# Patient Record
Sex: FEMALE | Race: WHITE | Employment: OTHER | URBAN - NONMETROPOLITAN AREA
[De-identification: names, ages, dates, MRNs, and addresses within clinical notes are randomized per-mention and may not be internally consistent; named-entity substitution may affect disease eponyms.]

---

## 2017-01-09 ENCOUNTER — TELEPHONE (OUTPATIENT)
Dept: FAMILY MEDICINE CLINIC | Age: 67
End: 2017-01-09

## 2017-01-09 DIAGNOSIS — M51.36 LUMBAR DISC NARROWING: Primary | ICD-10-CM

## 2017-05-25 ENCOUNTER — OFFICE VISIT (OUTPATIENT)
Dept: FAMILY MEDICINE CLINIC | Age: 67
End: 2017-05-25

## 2017-05-25 VITALS
BODY MASS INDEX: 26.09 KG/M2 | DIASTOLIC BLOOD PRESSURE: 70 MMHG | SYSTOLIC BLOOD PRESSURE: 122 MMHG | HEART RATE: 66 BPM | HEIGHT: 65 IN | WEIGHT: 156.6 LBS | RESPIRATION RATE: 18 BRPM

## 2017-05-25 DIAGNOSIS — E03.9 ACQUIRED HYPOTHYROIDISM: ICD-10-CM

## 2017-05-25 DIAGNOSIS — E78.00 PURE HYPERCHOLESTEROLEMIA: Primary | ICD-10-CM

## 2017-05-25 DIAGNOSIS — M81.0 OSTEOPOROSIS: ICD-10-CM

## 2017-05-25 PROBLEM — M85.80 OSTEOPENIA: Status: ACTIVE | Noted: 2017-05-25

## 2017-05-25 PROCEDURE — G8420 CALC BMI NORM PARAMETERS: HCPCS | Performed by: FAMILY MEDICINE

## 2017-05-25 PROCEDURE — G8427 DOCREV CUR MEDS BY ELIG CLIN: HCPCS | Performed by: FAMILY MEDICINE

## 2017-05-25 PROCEDURE — G8400 PT W/DXA NO RESULTS DOC: HCPCS | Performed by: FAMILY MEDICINE

## 2017-05-25 PROCEDURE — 4005F PHARM THX FOR OP RXD: CPT | Performed by: FAMILY MEDICINE

## 2017-05-25 PROCEDURE — 1123F ACP DISCUSS/DSCN MKR DOCD: CPT | Performed by: FAMILY MEDICINE

## 2017-05-25 PROCEDURE — 4040F PNEUMOC VAC/ADMIN/RCVD: CPT | Performed by: FAMILY MEDICINE

## 2017-05-25 PROCEDURE — 99214 OFFICE O/P EST MOD 30 MIN: CPT | Performed by: FAMILY MEDICINE

## 2017-05-25 PROCEDURE — 3014F SCREEN MAMMO DOC REV: CPT | Performed by: FAMILY MEDICINE

## 2017-05-25 PROCEDURE — 1036F TOBACCO NON-USER: CPT | Performed by: FAMILY MEDICINE

## 2017-05-25 PROCEDURE — 96372 THER/PROPH/DIAG INJ SC/IM: CPT | Performed by: FAMILY MEDICINE

## 2017-05-25 PROCEDURE — 1090F PRES/ABSN URINE INCON ASSESS: CPT | Performed by: FAMILY MEDICINE

## 2017-05-25 PROCEDURE — 3017F COLORECTAL CA SCREEN DOC REV: CPT | Performed by: FAMILY MEDICINE

## 2017-09-05 DIAGNOSIS — E78.5 HYPERLIPIDEMIA: ICD-10-CM

## 2017-09-05 RX ORDER — ATORVASTATIN CALCIUM 20 MG/1
TABLET, FILM COATED ORAL
Qty: 90 TABLET | Refills: 3 | Status: SHIPPED | OUTPATIENT
Start: 2017-09-05 | End: 2018-07-24 | Stop reason: SDUPTHER

## 2017-10-23 ENCOUNTER — HOSPITAL ENCOUNTER (OUTPATIENT)
Dept: WOMENS IMAGING | Age: 67
Discharge: HOME | End: 2017-10-23
Payer: MEDICARE

## 2017-10-23 DIAGNOSIS — Z12.31 VISIT FOR SCREENING MAMMOGRAM: ICD-10-CM

## 2017-10-23 PROCEDURE — 77063 BREAST TOMOSYNTHESIS BI: CPT

## 2017-10-25 ENCOUNTER — HOSPITAL ENCOUNTER (OUTPATIENT)
Dept: WOMENS IMAGING | Age: 67
Discharge: HOME | End: 2017-10-25
Payer: MEDICARE

## 2017-10-25 ENCOUNTER — APPOINTMENT (OUTPATIENT)
Dept: WOMENS IMAGING | Age: 67
End: 2017-10-25
Payer: MEDICARE

## 2017-10-25 DIAGNOSIS — R92.2 BREAST DENSITY: ICD-10-CM

## 2017-10-25 PROCEDURE — G0279 TOMOSYNTHESIS, MAMMO: HCPCS

## 2017-11-21 ENCOUNTER — TELEPHONE (OUTPATIENT)
Dept: FAMILY MEDICINE CLINIC | Age: 67
End: 2017-11-21

## 2017-12-11 ENCOUNTER — NURSE ONLY (OUTPATIENT)
Dept: FAMILY MEDICINE CLINIC | Age: 67
End: 2017-12-11
Payer: MEDICARE

## 2017-12-11 DIAGNOSIS — M81.0 SENILE OSTEOPOROSIS: Primary | ICD-10-CM

## 2017-12-11 DIAGNOSIS — Z78.0 POSTMENOPAUSAL STATUS (AGE-RELATED) (NATURAL): ICD-10-CM

## 2017-12-11 PROCEDURE — 96372 THER/PROPH/DIAG INJ SC/IM: CPT | Performed by: FAMILY MEDICINE

## 2017-12-11 NOTE — PROGRESS NOTES
After obtaining consent, and per orders of Dr. Sari Szymanski, injection of Prolia 60mg/ml subcutaneous given in Right arm by Ciera Osborn. Patient instructed to report any adverse reaction to me immediately.     ABN Signed  Pt tolerated well

## 2017-12-27 ENCOUNTER — OFFICE VISIT (OUTPATIENT)
Dept: FAMILY MEDICINE CLINIC | Age: 67
End: 2017-12-27
Payer: MEDICARE

## 2017-12-27 VITALS
WEIGHT: 156.6 LBS | HEART RATE: 69 BPM | DIASTOLIC BLOOD PRESSURE: 60 MMHG | BODY MASS INDEX: 26.09 KG/M2 | SYSTOLIC BLOOD PRESSURE: 112 MMHG | RESPIRATION RATE: 16 BRPM | OXYGEN SATURATION: 96 %

## 2017-12-27 DIAGNOSIS — M81.0 AGE-RELATED OSTEOPOROSIS WITHOUT CURRENT PATHOLOGICAL FRACTURE: ICD-10-CM

## 2017-12-27 DIAGNOSIS — Z00.00 ROUTINE GENERAL MEDICAL EXAMINATION AT A HEALTH CARE FACILITY: ICD-10-CM

## 2017-12-27 DIAGNOSIS — E78.00 PURE HYPERCHOLESTEROLEMIA: Primary | ICD-10-CM

## 2017-12-27 DIAGNOSIS — E03.9 ACQUIRED HYPOTHYROIDISM: ICD-10-CM

## 2017-12-27 PROCEDURE — 99214 OFFICE O/P EST MOD 30 MIN: CPT | Performed by: FAMILY MEDICINE

## 2017-12-27 PROCEDURE — 4040F PNEUMOC VAC/ADMIN/RCVD: CPT | Performed by: FAMILY MEDICINE

## 2017-12-27 PROCEDURE — 1123F ACP DISCUSS/DSCN MKR DOCD: CPT | Performed by: FAMILY MEDICINE

## 2017-12-27 PROCEDURE — 3014F SCREEN MAMMO DOC REV: CPT | Performed by: FAMILY MEDICINE

## 2017-12-27 PROCEDURE — 3017F COLORECTAL CA SCREEN DOC REV: CPT | Performed by: FAMILY MEDICINE

## 2017-12-27 PROCEDURE — 1036F TOBACCO NON-USER: CPT | Performed by: FAMILY MEDICINE

## 2017-12-27 PROCEDURE — G8419 CALC BMI OUT NRM PARAM NOF/U: HCPCS | Performed by: FAMILY MEDICINE

## 2017-12-27 PROCEDURE — G8427 DOCREV CUR MEDS BY ELIG CLIN: HCPCS | Performed by: FAMILY MEDICINE

## 2017-12-27 PROCEDURE — 1090F PRES/ABSN URINE INCON ASSESS: CPT | Performed by: FAMILY MEDICINE

## 2017-12-27 PROCEDURE — G8400 PT W/DXA NO RESULTS DOC: HCPCS | Performed by: FAMILY MEDICINE

## 2017-12-27 PROCEDURE — 4005F PHARM THX FOR OP RXD: CPT | Performed by: FAMILY MEDICINE

## 2017-12-27 PROCEDURE — G8484 FLU IMMUNIZE NO ADMIN: HCPCS | Performed by: FAMILY MEDICINE

## 2017-12-29 NOTE — PROGRESS NOTES
[facility] PROFESSIONAL SERVS  [facility] FAMILY MEDICINE  [address] [facility]  Dept: [phone]  Dept Fax: [phone]  Loc: [phone]    Venita Adams is a 75 y.o. female who presents today for:  Chief Complaint   Patient presents with    6 Month Follow-Up     hypercholesterolemia           HPI:     HPI    Well Adult Physical: Patient here for a comprehensive physical exam.The patient reports problems - cholesterol and thyroid issues are controlled on current medications. Recent dexa scan in 2015 shows osteoporosis. Do you take any herbs or supplements that were not prescribed by a doctor? no Are you taking calcium supplements? yes Are you taking aspirin daily? yes   History:  Any STD's in the past? none        Reviewed chart for past medical history , surgical history , allergies, social history , family history and medications. Health Maintenance   Topic Date Due    Flu vaccine (1) 12/29/2017 (Originally 9/1/2017)    Breast cancer screen  10/25/2019    Diabetes screen  01/07/2020    Lipid screen  01/07/2022    DTaP/Tdap/Td vaccine (2 - Td) 08/14/2023    Colon cancer screen colonoscopy  01/29/2026    Zostavax vaccine  Completed    DEXA (modify frequency per FRAX score)  Completed    Pneumococcal low/med risk  Completed    Hepatitis C screen  Completed       Subjective:      Constitutional: Negative for fever, chills, diaphoresis, activity change, appetite change and fatigue. HENT: Negative for hearing loss, ear pain, congestion, sore throat, rhinorrhea, postnasal drip and ear discharge. Eyes: Negative for photophobia and visual disturbance. Respiratory: Negative for cough, chest tightness, shortness of breath and wheezing. Cardiovascular: Negative for chest pain and leg swelling. Gastrointestinal: Negative for nausea, vomiting, abdominal pain, diarrhea and constipation. Genitourinary: Negative for dysuria, urgency and frequency.    Neurological: Negative for weakness, light-headedness and headaches. Psychiatric/Behavioral: Negative for sleep disturbance. Objective:     Vitals:    12/27/17 1156 12/27/17 1200   BP: 100/60 112/60   Site: Left Arm Right Arm   Position: Sitting    Cuff Size: Small Adult    Pulse: 69    Resp: 16    SpO2: 96%    Weight: 156 lb 9.6 oz (71 kg)      Wt Readings from Last 3 Encounters:   12/27/17 156 lb 9.6 oz (71 kg)   05/25/17 156 lb 9.6 oz (71 kg)   12/29/16 154 lb 6.4 oz (70 kg)       Physical Exam  Physical Exam   Constitutional: Vital signs are normal. She appears well-developed and well-nourished. She is active. HENT:   Head: Normocephalic and atraumatic. Right Ear: Tympanic membrane, external ear and ear canal normal. No drainage or tenderness. Left Ear: Tympanic membrane, external ear and ear canal normal. No drainage or tenderness. Nose: Nose normal. No mucosal edema or rhinorrhea. Mouth/Throat: Uvula is midline, oropharynx is clear and moist and mucous membranes are normal. Mucous membranes are not pale. Normal dentition. No posterior oropharyngeal edema or posterior oropharyngeal erythema. Eyes: Lids are normal. Right eye exhibits no chemosis and no discharge. Left eye exhibits no chemosis and no drainage. Right conjunctiva has no hemorrhage. Left conjunctiva has no hemorrhage. Right eye exhibits normal extraocular motion. Left eye exhibits normal extraocular motion. Right pupil is round and reactive. Left pupil is round and reactive. Pupils are equal.   Cardiovascular: Normal rate, regular rhythm, S1 normal, S2 normal and normal heart sounds. Exam reveals no gallop. No murmur heard. Pulmonary/Chest: Effort normal and breath sounds normal. No respiratory distress. She has no wheezes. She has no rhonchi. She has no rales. Abdominal: Soft. Normal appearance and bowel sounds are normal. She exhibits no distension and no mass. There is no hepatosplenomegaly. No tenderness.  She has no rigidity, no rebound and no guarding. No hernia. Musculoskeletal:        Right lower leg: She exhibits no edema. Left lower leg: She exhibits no edema. Neurological: She is alert. Assessment/Plan   Mac Stage was seen today for 6 month follow-up. Diagnoses and all orders for this visit:    Pure hypercholesterolemia  -     CBC; Future  -     Comprehensive Metabolic Panel; Future  -     Lipid Panel; Future    Acquired hypothyroidism  -     TSH With Reflex Ft4; Future    Age-related osteoporosis without current pathological fracture    Routine general medical examination at a health care facility  -     CBC; Future  -     Comprehensive Metabolic Panel; Future  -     Lipid Panel; Future  -     TSH With Reflex Ft4; Future    No change to medications   Continue healthy diet and exercise  Aspirin daily     Discussed use, benefit, and side effects of prescribed medications. All patient questions answered. Pt voiced understanding. Reviewed health maintenance. Instructed to continue current medications, diet and exercise. Patient agreed with treatment plan. Follow up as directed.      Electronically signed by Cody Delarosa MD

## 2018-01-18 ENCOUNTER — HOSPITAL ENCOUNTER (OUTPATIENT)
Age: 68
Discharge: HOME | End: 2018-01-18
Payer: MEDICARE

## 2018-01-18 DIAGNOSIS — E78.00 PURE HYPERCHOLESTEROLEMIA: ICD-10-CM

## 2018-01-18 DIAGNOSIS — Z00.00 ROUTINE GENERAL MEDICAL EXAMINATION AT A HEALTH CARE FACILITY: ICD-10-CM

## 2018-01-18 DIAGNOSIS — E03.9 ACQUIRED HYPOTHYROIDISM: ICD-10-CM

## 2018-01-18 LAB
ALBUMIN SERPL-MCNC: 4.3 G/DL (ref 3.5–5.1)
ALP BLD-CCNC: 66 U/L (ref 38–126)
ALT SERPL-CCNC: 13 U/L (ref 11–66)
ANION GAP SERPL CALCULATED.3IONS-SCNC: 16 MEQ/L (ref 8–16)
AST SERPL-CCNC: 17 U/L (ref 5–40)
BILIRUB SERPL-MCNC: 0.6 MG/DL (ref 0.3–1.2)
BUN BLDV-MCNC: 13 MG/DL (ref 7–22)
CALCIUM SERPL-MCNC: 9 MG/DL (ref 8.5–10.5)
CHLORIDE BLD-SCNC: 105 MEQ/L (ref 98–111)
CHOLESTEROL, TOTAL: 133 MG/DL (ref 100–199)
CO2: 24 MEQ/L (ref 23–33)
CREAT SERPL-MCNC: 0.6 MG/DL (ref 0.4–1.2)
GFR SERPL CREATININE-BSD FRML MDRD: > 90 ML/MIN/1.73M2
GLUCOSE BLD-MCNC: 102 MG/DL (ref 70–108)
HCT VFR BLD CALC: 44.2 % (ref 37–47)
HDLC SERPL-MCNC: 68 MG/DL
HEMOGLOBIN: 14.6 GM/DL (ref 12–16)
LDL CHOLESTEROL CALCULATED: 50 MG/DL
MCH RBC QN AUTO: 30.1 PG (ref 27–31)
MCHC RBC AUTO-ENTMCNC: 33 GM/DL (ref 33–37)
MCV RBC AUTO: 91.3 FL (ref 81–99)
PDW BLD-RTO: 13.3 % (ref 11.5–14.5)
PLATELET # BLD: 272 THOU/MM3 (ref 130–400)
PMV BLD AUTO: 9.1 MCM (ref 7.4–10.4)
POTASSIUM SERPL-SCNC: 4.4 MEQ/L (ref 3.5–5.2)
RBC # BLD: 4.84 MILL/MM3 (ref 4.2–5.4)
SODIUM BLD-SCNC: 145 MEQ/L (ref 135–145)
TOTAL PROTEIN: 7.2 G/DL (ref 6.1–8)
TRIGL SERPL-MCNC: 76 MG/DL (ref 0–199)
TSH SERPL DL<=0.05 MIU/L-ACNC: 0.42 UIU/ML (ref 0.4–4.2)
WBC # BLD: 6.4 THOU/MM3 (ref 4.8–10.8)

## 2018-01-18 PROCEDURE — 80053 COMPREHEN METABOLIC PANEL: CPT

## 2018-01-18 PROCEDURE — 80061 LIPID PANEL: CPT

## 2018-01-18 PROCEDURE — 85027 COMPLETE CBC AUTOMATED: CPT

## 2018-01-18 PROCEDURE — 36415 COLL VENOUS BLD VENIPUNCTURE: CPT

## 2018-01-18 PROCEDURE — 84443 ASSAY THYROID STIM HORMONE: CPT

## 2018-04-05 ENCOUNTER — OFFICE VISIT (OUTPATIENT)
Dept: FAMILY MEDICINE CLINIC | Age: 68
End: 2018-04-05
Payer: MEDICARE

## 2018-04-05 VITALS
WEIGHT: 150 LBS | BODY MASS INDEX: 25.61 KG/M2 | DIASTOLIC BLOOD PRESSURE: 70 MMHG | HEART RATE: 80 BPM | SYSTOLIC BLOOD PRESSURE: 130 MMHG | RESPIRATION RATE: 16 BRPM | HEIGHT: 64 IN | TEMPERATURE: 99.6 F

## 2018-04-05 DIAGNOSIS — J01.90 ACUTE BACTERIAL SINUSITIS: Primary | ICD-10-CM

## 2018-04-05 DIAGNOSIS — B96.89 ACUTE BACTERIAL SINUSITIS: Primary | ICD-10-CM

## 2018-04-05 PROCEDURE — G8419 CALC BMI OUT NRM PARAM NOF/U: HCPCS | Performed by: NURSE PRACTITIONER

## 2018-04-05 PROCEDURE — 3017F COLORECTAL CA SCREEN DOC REV: CPT | Performed by: NURSE PRACTITIONER

## 2018-04-05 PROCEDURE — 99213 OFFICE O/P EST LOW 20 MIN: CPT | Performed by: NURSE PRACTITIONER

## 2018-04-05 PROCEDURE — 4040F PNEUMOC VAC/ADMIN/RCVD: CPT | Performed by: NURSE PRACTITIONER

## 2018-04-05 PROCEDURE — 1123F ACP DISCUSS/DSCN MKR DOCD: CPT | Performed by: NURSE PRACTITIONER

## 2018-04-05 PROCEDURE — G8400 PT W/DXA NO RESULTS DOC: HCPCS | Performed by: NURSE PRACTITIONER

## 2018-04-05 PROCEDURE — G8427 DOCREV CUR MEDS BY ELIG CLIN: HCPCS | Performed by: NURSE PRACTITIONER

## 2018-04-05 PROCEDURE — 1036F TOBACCO NON-USER: CPT | Performed by: NURSE PRACTITIONER

## 2018-04-05 PROCEDURE — 1090F PRES/ABSN URINE INCON ASSESS: CPT | Performed by: NURSE PRACTITIONER

## 2018-04-05 PROCEDURE — 3014F SCREEN MAMMO DOC REV: CPT | Performed by: NURSE PRACTITIONER

## 2018-04-05 RX ORDER — AZITHROMYCIN 250 MG/1
TABLET, FILM COATED ORAL
Qty: 1 PACKET | Refills: 0 | Status: SHIPPED | OUTPATIENT
Start: 2018-04-05 | End: 2018-04-15

## 2018-06-11 ENCOUNTER — TELEPHONE (OUTPATIENT)
Dept: FAMILY MEDICINE CLINIC | Age: 68
End: 2018-06-11

## 2018-06-15 ENCOUNTER — OFFICE VISIT (OUTPATIENT)
Dept: FAMILY MEDICINE CLINIC | Age: 68
End: 2018-06-15
Payer: MEDICARE

## 2018-06-15 DIAGNOSIS — M81.0 AGE-RELATED OSTEOPOROSIS WITHOUT CURRENT PATHOLOGICAL FRACTURE: Primary | ICD-10-CM

## 2018-06-15 PROCEDURE — 96372 THER/PROPH/DIAG INJ SC/IM: CPT | Performed by: FAMILY MEDICINE

## 2018-07-24 DIAGNOSIS — E78.5 HYPERLIPIDEMIA: ICD-10-CM

## 2018-07-24 RX ORDER — ATORVASTATIN CALCIUM 20 MG/1
TABLET, FILM COATED ORAL
Qty: 90 TABLET | Refills: 3 | Status: SHIPPED | OUTPATIENT
Start: 2018-07-24 | End: 2019-08-12 | Stop reason: SDUPTHER

## 2018-10-04 ENCOUNTER — HOSPITAL ENCOUNTER (OUTPATIENT)
Dept: WOMENS IMAGING | Age: 68
Discharge: HOME | End: 2018-10-04
Payer: MEDICARE

## 2018-10-04 DIAGNOSIS — Z12.31 VISIT FOR SCREENING MAMMOGRAM: ICD-10-CM

## 2018-10-04 PROCEDURE — 77063 BREAST TOMOSYNTHESIS BI: CPT

## 2018-10-30 ENCOUNTER — TELEPHONE (OUTPATIENT)
Dept: FAMILY MEDICINE CLINIC | Age: 68
End: 2018-10-30

## 2018-12-17 ENCOUNTER — NURSE ONLY (OUTPATIENT)
Dept: FAMILY MEDICINE CLINIC | Age: 68
End: 2018-12-17
Payer: MEDICARE

## 2018-12-17 DIAGNOSIS — M81.0 AGE-RELATED OSTEOPOROSIS WITHOUT CURRENT PATHOLOGICAL FRACTURE: Primary | ICD-10-CM

## 2018-12-17 PROCEDURE — 96372 THER/PROPH/DIAG INJ SC/IM: CPT | Performed by: FAMILY MEDICINE

## 2019-05-08 ENCOUNTER — OFFICE VISIT (OUTPATIENT)
Dept: FAMILY MEDICINE CLINIC | Age: 69
End: 2019-05-08
Payer: MEDICARE

## 2019-05-08 VITALS
TEMPERATURE: 98.1 F | RESPIRATION RATE: 20 BRPM | WEIGHT: 163 LBS | SYSTOLIC BLOOD PRESSURE: 132 MMHG | BODY MASS INDEX: 27.83 KG/M2 | HEART RATE: 80 BPM | DIASTOLIC BLOOD PRESSURE: 80 MMHG | HEIGHT: 64 IN

## 2019-05-08 DIAGNOSIS — N89.8 VAGINAL DISCHARGE: ICD-10-CM

## 2019-05-08 DIAGNOSIS — E03.9 ACQUIRED HYPOTHYROIDISM: ICD-10-CM

## 2019-05-08 DIAGNOSIS — E78.00 PURE HYPERCHOLESTEROLEMIA: Primary | ICD-10-CM

## 2019-05-08 DIAGNOSIS — M81.0 SENILE OSTEOPOROSIS: ICD-10-CM

## 2019-05-08 DIAGNOSIS — Z00.00 ROUTINE GENERAL MEDICAL EXAMINATION AT A HEALTH CARE FACILITY: ICD-10-CM

## 2019-05-08 PROCEDURE — 1036F TOBACCO NON-USER: CPT | Performed by: FAMILY MEDICINE

## 2019-05-08 PROCEDURE — 3017F COLORECTAL CA SCREEN DOC REV: CPT | Performed by: FAMILY MEDICINE

## 2019-05-08 PROCEDURE — G8419 CALC BMI OUT NRM PARAM NOF/U: HCPCS | Performed by: FAMILY MEDICINE

## 2019-05-08 PROCEDURE — G8427 DOCREV CUR MEDS BY ELIG CLIN: HCPCS | Performed by: FAMILY MEDICINE

## 2019-05-08 PROCEDURE — 93000 ELECTROCARDIOGRAM COMPLETE: CPT | Performed by: FAMILY MEDICINE

## 2019-05-08 PROCEDURE — 1090F PRES/ABSN URINE INCON ASSESS: CPT | Performed by: FAMILY MEDICINE

## 2019-05-08 PROCEDURE — 4040F PNEUMOC VAC/ADMIN/RCVD: CPT | Performed by: FAMILY MEDICINE

## 2019-05-08 PROCEDURE — 1123F ACP DISCUSS/DSCN MKR DOCD: CPT | Performed by: FAMILY MEDICINE

## 2019-05-08 PROCEDURE — 99214 OFFICE O/P EST MOD 30 MIN: CPT | Performed by: FAMILY MEDICINE

## 2019-05-08 PROCEDURE — G0438 PPPS, INITIAL VISIT: HCPCS | Performed by: FAMILY MEDICINE

## 2019-05-08 PROCEDURE — G8400 PT W/DXA NO RESULTS DOC: HCPCS | Performed by: FAMILY MEDICINE

## 2019-05-08 ASSESSMENT — PATIENT HEALTH QUESTIONNAIRE - PHQ9
SUM OF ALL RESPONSES TO PHQ QUESTIONS 1-9: 0
SUM OF ALL RESPONSES TO PHQ QUESTIONS 1-9: 0

## 2019-05-08 ASSESSMENT — LIFESTYLE VARIABLES: HOW OFTEN DO YOU HAVE A DRINK CONTAINING ALCOHOL: 0

## 2019-05-08 ASSESSMENT — ANXIETY QUESTIONNAIRES: GAD7 TOTAL SCORE: 0

## 2019-05-08 NOTE — PROGRESS NOTES
Eyes: Negative for photophobia and visual disturbance. Respiratory: Negative for cough, chest tightness, shortness of breath and wheezing. Cardiovascular: Negative for chest pain and leg swelling. Gastrointestinal: Negative for nausea, vomiting, abdominal pain, diarrhea and constipation. Genitourinary: Negative for dysuria, urgency and frequency. Neurological: Negative for weakness, light-headedness and headaches. Psychiatric/Behavioral: Negative for sleep disturbance. Objective:     Vitals:    05/08/19 1109 05/08/19 1122   BP: (!) 150/82 138/78   Site: Left Upper Arm Left Upper Arm   Position: Sitting Sitting   Cuff Size: Medium Adult Medium Adult   Pulse: 80    Resp: 20    Temp: 98.1 °F (36.7 °C)    TempSrc: Temporal    Weight: 163 lb (73.9 kg)    Height: 5' 3.78\" (1.62 m)      Wt Readings from Last 3 Encounters:   05/08/19 163 lb (73.9 kg)   04/05/18 150 lb (68 kg)   12/27/17 156 lb 9.6 oz (71 kg)       Physical Exam  Physical Exam   Constitutional: Vital signs are normal. She appears well-developed and well-nourished. She is active. HENT:   Head: Normocephalic and atraumatic. Right Ear: Tympanic membrane, external ear and ear canal normal. No drainage or tenderness. Left Ear: Tympanic membrane, external ear and ear canal normal. No drainage or tenderness. Nose: Nose normal. No mucosal edema or rhinorrhea. Mouth/Throat: Uvula is midline, oropharynx is clear and moist and mucous membranes are normal. Mucous membranes are not pale. Normal dentition. No posterior oropharyngeal edema or posterior oropharyngeal erythema. Eyes: Lids are normal. Right eye exhibits no chemosis and no discharge. Left eye exhibits no chemosis and no drainage. Right conjunctiva has no hemorrhage. Left conjunctiva has no hemorrhage. Right eye exhibits normal extraocular motion. Left eye exhibits normal extraocular motion. Right pupil is round and reactive. Left pupil is round and reactive.  Pupils are equal. Cardiovascular: Normal rate, regular rhythm, S1 normal, S2 normal and normal heart sounds. Exam reveals no gallop. No murmur heard. Pulmonary/Chest: Effort normal and breath sounds normal. No respiratory distress. She has no wheezes. She has no rhonchi. She has no rales. Abdominal: Soft. Normal appearance and bowel sounds are normal. She exhibits no distension and no mass. There is no hepatosplenomegaly. No tenderness. She has no rigidity, no rebound and no guarding. No hernia. Musculoskeletal:        Right lower leg: She exhibits no edema. Left lower leg: She exhibits no edema. Neurological: She is alert. Assessment/Plan   Anthony Taylor was seen today for medicare awv and vaginal discharge. Diagnoses and all orders for this visit:    Pure hypercholesterolemia  -     Comprehensive Metabolic Panel; Future  -     Lipid Panel; Future    Acquired hypothyroidism  -     TSH With Reflex Ft4; Future    Senile osteoporosis    Vaginal discharge    Routine general medical examination at a health care facility  -     CBC; Future  -     Comprehensive Metabolic Panel; Future  -     Lipid Panel; Future  -     TSH With Reflex Ft4; Future  -     EKG 12 Lead    No change to medications   Continue healthy diet and exercise  Aspirin daily    Discussed use, benefit, and side effectsof prescribed medications. All patient questions answered. Pt voiced understanding. Reviewed health maintenance. Instructed to continue current medications, diet and exercise. Patient agreed with treatment plan. Followup as directed.      Electronically signed by Garima Porter MD              Personalized Preventive Plan   Current Health Maintenance Status  Immunization History   Administered Date(s) Administered    Pneumococcal 13-valent Conjugate ([id number]) 08/21/2015    Pneumococcal Polysaccharide ([id number]) 12/29/2016    Tdap (Boostrix, Adacel) 08/14/2013        Health Maintenance   Topic Date Due    Shingles Vaccine (1 of 2) 04/16/1999    TSH testing  01/18/2019    Flu vaccine (Season Ended) 09/01/2019    Diabetes screen  01/07/2020    Breast cancer screen  10/04/2020    Lipid screen  01/18/2023    DTaP/Tdap/Td vaccine (2 - Td) 08/14/2023    Colon cancer screen colonoscopy  01/29/2026    DEXA (modify frequency per FRAX score)  Completed    Pneumococcal 65+ years Vaccine  Completed    Hepatitis C screen  Completed     Recommendations for Preventive Services Due: see orders and patient instructions/AVS.  .   Recommended screening schedule for the next 5-10 years is provided to the patient in written form: see Patient Instructions/AVS.

## 2019-05-08 NOTE — PATIENT INSTRUCTIONS
Advance Directives: Care Instructions  Your Care Instructions  An advance directive is a legal way to state your wishes at the end of your life. It tells your family and your doctor what to do if you can no longer say what you want. There are two main types of advance directives. You can change them any time that your wishes change. · A living will tells your family and your doctor your wishes about life support and other treatment. · A durable power of  for health care lets you name a person to make treatment decisions for you when you can't speak for yourself. This person is called a health care agent. If you do not have an advance directive, decisions about your medical care may be made by a doctor or a  who doesn't know you. It may help to think of an advance directive as a gift to the people who care for you. If you have one, they won't have to make tough decisions by themselves. Follow-up care is a key part of your treatment and safety. Be sure to make and go to all appointments, and call your doctor if you are having problems. It's also a good idea to know your test results and keep a list of the medicines you take. How can you care for yourself at home? · Discuss your wishes with your loved ones and your doctor. This way, there are no surprises. · Many states have a unique form. Or you might use a universal form that has been approved by many states. This kind of form can sometimes be completed and stored online. Your electronic copy will then be available wherever you have a connection to the Internet. In most cases, doctors will respect your wishes even if you have a form from a different state. · You don't need a  to do an advance directive. But you may want to get legal advice. · Think about these questions when you prepare an advance directive:  ? Who do you want to make decisions about your medical care if you are not able to?  Many people choose a family member or close friend. ? Do you know enough about life support methods that might be used? If not, talk to your doctor so you understand. ? What are you most afraid of that might happen? You might be afraid of having pain, losing your independence, or being kept alive by machines. ? Where would you prefer to die? Choices include your home, a hospital, or a nursing home. ? Would you like to have information about hospice care to support you and your family? ? Do you want to donate organs when you die? ? Do you want certain Temple practices performed before you die? If so, put your wishes in the advance directive. · Read your advance directive every year, and make changes as needed. When should you call for help? Be sure to contact your doctor if you have any questions. Where can you learn more? Go to https://chzaceb.[email or web address] org and sign in to your [employer] account. Enter R264 in the RealLife[employer] box to learn more about \"Advance Directives: Care Instructions. \"     If you do not have an account, please click on the \"Sign Up Now\" link. Current as of: April 18, 2018  Content Version: 12.0  © 3494-8420 Healthwise, [employer]. Care instructions adapted under license by [facility] ([facility]). If you have questions about a medical condition or this instruction, always ask your healthcare professional. [address] any warranty or liability for your use of this information. Learning About Durable Power of  for Health Care  What is a durable power of  for health care? A durable power of  for health care is one type of the legal forms called advance directives. It lets you decide who you want to make treatment decisions for you if you cannot speak or decide for yourself. The person you choose is called your health care agent. Another type of advance directive is a living will.  It lets you write down what kinds of treatment or life support you require you to get the form notarized. This means that a person called a  watches you sign the form and then he or she signs the form. Some states also require that two or more witnesses sign the form. Be sure to tell your family members and doctors who your health care agent is. Keep your forms in a safe place. But make sure that your loved ones know where the forms are. This could be in your desk where you keep other important papers. Make sure your doctor has a copy of your forms. Where can you learn more? Go to https://chpepiceweb.M-Files. org and sign in to your [employer] account. Enter [id number] in the [employer] box to learn more about \"Learning About Durable Power of  for Health Care. \"     If you do not have an account, please click on the \"Sign Up Now\" link. Current as of: April 18, 2018  Content Version: 12.0  © 9977-6794 [employer]. Care instructions adapted under license by [facility] ([facility]). If you have questions about a medical condition or this instruction, always ask your healthcare professional. [address] any warranty or liability for your use of this information. Learning About Living Juan Rndfrancie Mendez  What is a living will? A living will is a legal form you use to write down the kind of care you want at the end of your life. It is used by the health professionals who will treat you if you aren't able to decide for yourself. If you put your wishes in writing, your loved ones and others will know what kind of care you want. They won't need to guess. This can ease your mind and be helpful to others. A living will is not the same as an estate or property will. An estate will explains what you want to happen with your money and property after you die. Is a living will a legal document? A living will is a legal document. Each state has its own laws about living gotti.  If you move to another state, make sure that your living breathe on your own, your heart stops, or you're unable to swallow. · What things would you still want to be able to do after you receive life-support methods? Would you want to be able to walk? To speak? To eat on your own? To live without the help of machines? · If you have a choice, where do you want to be cared for? In your home? At a hospital or nursing home? · Do you want certain Hindu practices performed if you become very ill? · If you have a choice at the end of your life, where would you prefer to die? At home? In a hospital or nursing home? Somewhere else? · Would you prefer to be buried or cremated? · Do you want your organs to be donated after you die? What should you do with your living will? · Make sure that your family members and your health care agent have copies of your living will. · Give your doctor a copy of your living will to keep in your medical record. If you have more than one doctor, make sure that each one has a copy. · You may want to put a copy of your living will where it can be easily found. Where can you learn more? Go to https://Innovative Student Loan SolutionspeThink Good Thoughtseb.Deadeye Marksmanship. org and sign in to your [employer] account. Enter F259 in the [employer] box to learn more about \"Learning About Living Donna Magallanes. \"     If you do not have an account, please click on the \"Sign Up Now\" link. Current as of: April 18, 2018  Content Version: 12.0  © 0819-2006 Healthwise, Incorporated. Care instructions adapted under license by [facility] ([facility]). If you have questions about a medical condition or this instruction, always ask your healthcare professional. [place] [address] any warranty or liability for your use of this information. Personalized Preventive Plan for Mary Sykes - 5/8/2019  Medicare offers a range of preventive health benefits. Some of the tests and screenings are paid in full while other may be subject to a deductible, co-insurance, and/or copay.     Some of these benefits include a comprehensive review of your medical history including lifestyle, illnesses that may run in your family, and various assessments and screenings as appropriate. After reviewing your medical record and screening and assessments performed today your provider may have ordered immunizations, labs, imaging, and/or referrals for you. A list of these orders (if applicable) as well as your Preventive Care list are included within your After Visit Summary for your review. Other Preventive Recommendations:    · A preventive eye exam performed by an eye specialist is recommended every 1-2 years to screen for glaucoma; cataracts, macular degeneration, and other eye disorders. · A preventive dental visit is recommended every 6 months. · Try to get at least 150 minutes of exercise per week or 10,000 steps per day on a pedometer . · Order or download the FREE \"Exercise & Physical Activity: Your Everyday Guide\" from [employer]. Call 1-968.648.4071 or search [employer] online. · You need 6143-3914 mg of calcium and 6195-1791 IU of vitamin D per day. It is possible to meet your calcium requirement with diet alone, but a vitamin D supplement is usually necessary to meet this goal.  · When exposed to the sun, use a sunscreen that protects against both UVA and UVB radiation with an SPF of 30 or greater. Reapply every 2 to 3 hours or after sweating, drying off with a towel, or swimming. · Always wear a seat belt when traveling in a car. Always wear a helmet when riding a bicycle or motorcycle.

## 2019-05-13 ENCOUNTER — NURSE ONLY (OUTPATIENT)
Dept: LAB | Age: 69
End: 2019-05-13

## 2019-05-13 DIAGNOSIS — Z00.00 ROUTINE GENERAL MEDICAL EXAMINATION AT A HEALTH CARE FACILITY: ICD-10-CM

## 2019-05-13 DIAGNOSIS — E03.9 ACQUIRED HYPOTHYROIDISM: ICD-10-CM

## 2019-05-13 DIAGNOSIS — E78.00 PURE HYPERCHOLESTEROLEMIA: ICD-10-CM

## 2019-05-13 LAB
ALBUMIN SERPL-MCNC: 4.2 G/DL (ref 3.5–5.1)
ALP BLD-CCNC: 67 U/L (ref 38–126)
ALT SERPL-CCNC: 15 U/L (ref 11–66)
ANION GAP SERPL CALCULATED.3IONS-SCNC: 11 MEQ/L (ref 8–16)
AST SERPL-CCNC: 18 U/L (ref 5–40)
BILIRUB SERPL-MCNC: 0.5 MG/DL (ref 0.3–1.2)
BUN BLDV-MCNC: 12 MG/DL (ref 7–22)
CALCIUM SERPL-MCNC: 9.5 MG/DL (ref 8.5–10.5)
CHLORIDE BLD-SCNC: 105 MEQ/L (ref 98–111)
CHOLESTEROL, TOTAL: 137 MG/DL (ref 100–199)
CO2: 26 MEQ/L (ref 23–33)
CREAT SERPL-MCNC: 0.8 MG/DL (ref 0.4–1.2)
ERYTHROCYTE [DISTWIDTH] IN BLOOD BY AUTOMATED COUNT: 12.8 % (ref 11.5–14.5)
ERYTHROCYTE [DISTWIDTH] IN BLOOD BY AUTOMATED COUNT: 43.8 FL (ref 35–45)
GFR SERPL CREATININE-BSD FRML MDRD: 71 ML/MIN/1.73M2
GLUCOSE BLD-MCNC: 104 MG/DL (ref 70–108)
HCT VFR BLD CALC: 45.9 % (ref 37–47)
HDLC SERPL-MCNC: 57 MG/DL
HEMOGLOBIN: 14.6 GM/DL (ref 12–16)
LDL CHOLESTEROL CALCULATED: 61 MG/DL
MCH RBC QN AUTO: 30 PG (ref 26–33)
MCHC RBC AUTO-ENTMCNC: 31.8 GM/DL (ref 32.2–35.5)
MCV RBC AUTO: 94.3 FL (ref 81–99)
PLATELET # BLD: 275 THOU/MM3 (ref 130–400)
PMV BLD AUTO: 10.9 FL (ref 9.4–12.4)
POTASSIUM SERPL-SCNC: 5 MEQ/L (ref 3.5–5.2)
RBC # BLD: 4.87 MILL/MM3 (ref 4.2–5.4)
SODIUM BLD-SCNC: 142 MEQ/L (ref 135–145)
TOTAL PROTEIN: 7.2 G/DL (ref 6.1–8)
TRIGL SERPL-MCNC: 96 MG/DL (ref 0–199)
TSH SERPL DL<=0.05 MIU/L-ACNC: 0.42 UIU/ML (ref 0.4–4.2)
WBC # BLD: 6.9 THOU/MM3 (ref 4.8–10.8)

## 2019-06-13 ENCOUNTER — TELEPHONE (OUTPATIENT)
Dept: FAMILY MEDICINE CLINIC | Age: 69
End: 2019-06-13

## 2019-06-13 NOTE — TELEPHONE ENCOUNTER
Prolia Prior Auth initiated thru [employer] - last [email or web address]/17/2018 - next injection 06/15/2019

## 2019-06-18 NOTE — TELEPHONE ENCOUNTER
ALEX latham Pt's Summary of Benefits for Prolia - pt has coverage - secondary insurance will cover Medicare Part B deductible, co-insurance and 100% excess charges - pt contacted and scheduled for 6-24-19 @ 11:00AM

## 2019-06-24 ENCOUNTER — NURSE ONLY (OUTPATIENT)
Dept: FAMILY MEDICINE CLINIC | Age: 69
End: 2019-06-24
Payer: MEDICARE

## 2019-06-24 DIAGNOSIS — M81.0 SENILE OSTEOPOROSIS: Primary | ICD-10-CM

## 2019-06-24 DIAGNOSIS — M81.0 AGE-RELATED OSTEOPOROSIS WITHOUT CURRENT PATHOLOGICAL FRACTURE: ICD-10-CM

## 2019-06-24 PROCEDURE — 96372 THER/PROPH/DIAG INJ SC/IM: CPT | Performed by: FAMILY MEDICINE

## 2019-08-12 DIAGNOSIS — E78.5 HYPERLIPIDEMIA: ICD-10-CM

## 2019-08-13 RX ORDER — ATORVASTATIN CALCIUM 20 MG/1
TABLET, FILM COATED ORAL
Qty: 90 TABLET | Refills: 3 | Status: SHIPPED | OUTPATIENT
Start: 2019-08-13 | End: 2020-07-27

## 2019-10-07 ENCOUNTER — HOSPITAL ENCOUNTER (OUTPATIENT)
Dept: MAMMOGRAPHY | Age: 69
Discharge: HOME | End: 2019-10-07
Payer: MEDICARE

## 2019-10-07 DIAGNOSIS — Z12.39 SCREENING BREAST EXAMINATION: ICD-10-CM

## 2019-10-07 PROCEDURE — 77063 BREAST TOMOSYNTHESIS BI: CPT

## 2019-10-11 ENCOUNTER — HOSPITAL ENCOUNTER (OUTPATIENT)
Dept: WOMENS IMAGING | Age: 69
Discharge: HOME | End: 2019-10-11
Payer: MEDICARE

## 2019-10-11 DIAGNOSIS — R92.2 BREAST DENSITY: ICD-10-CM

## 2019-10-11 PROCEDURE — 76642 ULTRASOUND BREAST LIMITED: CPT

## 2019-10-11 PROCEDURE — G0279 TOMOSYNTHESIS, MAMMO: HCPCS

## 2019-10-23 ENCOUNTER — HOSPITAL ENCOUNTER (OUTPATIENT)
Dept: WOMENS IMAGING | Age: 69
Discharge: HOME | End: 2019-10-23
Payer: MEDICARE

## 2019-10-23 DIAGNOSIS — R92.2 BREAST DENSITY: ICD-10-CM

## 2019-10-23 DIAGNOSIS — N60.02 CYST OF LEFT BREAST: ICD-10-CM

## 2019-10-23 PROCEDURE — C1894 INTRO/SHEATH, NON-LASER: HCPCS

## 2019-10-23 PROCEDURE — 88305 TISSUE EXAM BY PATHOLOGIST: CPT

## 2019-10-23 PROCEDURE — 88360 TUMOR IMMUNOHISTOCHEM/MANUAL: CPT

## 2019-10-23 PROCEDURE — G0279 TOMOSYNTHESIS, MAMMO: HCPCS

## 2019-10-23 PROCEDURE — 2709999900 HC NON-CHARGEABLE SUPPLY

## 2019-10-23 PROCEDURE — 2720000010 HC SURG SUPPLY STERILE

## 2019-10-23 PROCEDURE — 88342 IMHCHEM/IMCYTCHM 1ST ANTB: CPT

## 2019-10-23 PROCEDURE — 88341 IMHCHEM/IMCYTCHM EA ADD ANTB: CPT

## 2019-10-23 PROCEDURE — 19000 PUNCTURE ASPIR CYST BREAST: CPT

## 2019-10-23 PROCEDURE — A4648 IMPLANTABLE TISSUE MARKER: HCPCS

## 2019-10-23 PROCEDURE — 19081 BX BREAST 1ST LESION STRTCTC: CPT

## 2019-10-23 ASSESSMENT — PAIN DESCRIPTION - DESCRIPTORS: DESCRIPTORS: TENDER

## 2019-10-23 ASSESSMENT — PAIN DESCRIPTION - ORIENTATION: ORIENTATION: RIGHT;LEFT

## 2019-10-23 ASSESSMENT — PAIN DESCRIPTION - LOCATION: LOCATION: BREAST

## 2019-10-23 ASSESSMENT — PAIN DESCRIPTION - FREQUENCY: FREQUENCY: INTERMITTENT

## 2019-10-24 ENCOUNTER — CLINICAL DOCUMENTATION (OUTPATIENT)
Dept: WOMENS IMAGING | Age: 69
End: 2019-10-24

## 2019-11-01 ENCOUNTER — CLINICAL DOCUMENTATION (OUTPATIENT)
Dept: PHYSICAL THERAPY | Age: 69
End: 2019-11-01

## 2019-11-01 ENCOUNTER — CLINICAL DOCUMENTATION (OUTPATIENT)
Dept: ONCOLOGY | Age: 69
End: 2019-11-01

## 2019-11-01 DIAGNOSIS — D05.12 DUCTAL CARCINOMA IN SITU (DCIS) OF LEFT BREAST: Primary | ICD-10-CM

## 2019-11-04 ENCOUNTER — NURSE ONLY (OUTPATIENT)
Dept: LAB | Age: 69
End: 2019-11-04

## 2019-11-04 ENCOUNTER — OFFICE VISIT (OUTPATIENT)
Dept: SURGERY | Age: 69
End: 2019-11-04
Payer: MEDICARE

## 2019-11-04 ENCOUNTER — TELEPHONE (OUTPATIENT)
Dept: SURGERY | Age: 69
End: 2019-11-04

## 2019-11-04 VITALS
TEMPERATURE: 97.3 F | HEIGHT: 64 IN | DIASTOLIC BLOOD PRESSURE: 78 MMHG | BODY MASS INDEX: 27.31 KG/M2 | RESPIRATION RATE: 18 BRPM | HEART RATE: 87 BPM | OXYGEN SATURATION: 98 % | SYSTOLIC BLOOD PRESSURE: 124 MMHG | WEIGHT: 160 LBS

## 2019-11-04 DIAGNOSIS — N60.92 ATYPICAL LOBULAR HYPERPLASIA (ALH) OF LEFT BREAST: ICD-10-CM

## 2019-11-04 DIAGNOSIS — D48.62 NEOPLASM OF UNCERTAIN BEHAVIOR OF LEFT BREAST: Primary | ICD-10-CM

## 2019-11-04 DIAGNOSIS — Z01.818 PRE-OP TESTING: Primary | ICD-10-CM

## 2019-11-04 DIAGNOSIS — D48.62 NEOPLASM OF UNCERTAIN BEHAVIOR OF LEFT BREAST: ICD-10-CM

## 2019-11-04 DIAGNOSIS — Z01.818 PRE-OP TESTING: ICD-10-CM

## 2019-11-04 PROBLEM — C50.912: Status: ACTIVE | Noted: 2019-11-04

## 2019-11-04 PROBLEM — C79.89: Status: ACTIVE | Noted: 2019-11-04

## 2019-11-04 PROBLEM — D05.12 DUCTAL CARCINOMA IN SITU (DCIS) OF LEFT BREAST: Status: ACTIVE | Noted: 2019-11-04

## 2019-11-04 LAB
HCT VFR BLD CALC: 44.8 % (ref 37–47)
HEMOGLOBIN: 14.5 GM/DL (ref 12–16)

## 2019-11-04 PROCEDURE — 99204 OFFICE O/P NEW MOD 45 MIN: CPT | Performed by: SURGERY

## 2019-11-04 PROCEDURE — 4040F PNEUMOC VAC/ADMIN/RCVD: CPT | Performed by: SURGERY

## 2019-11-04 PROCEDURE — 1123F ACP DISCUSS/DSCN MKR DOCD: CPT | Performed by: SURGERY

## 2019-11-04 PROCEDURE — G8427 DOCREV CUR MEDS BY ELIG CLIN: HCPCS | Performed by: SURGERY

## 2019-11-04 PROCEDURE — G8484 FLU IMMUNIZE NO ADMIN: HCPCS | Performed by: SURGERY

## 2019-11-04 PROCEDURE — 1036F TOBACCO NON-USER: CPT | Performed by: SURGERY

## 2019-11-04 PROCEDURE — G8417 CALC BMI ABV UP PARAM F/U: HCPCS | Performed by: SURGERY

## 2019-11-04 PROCEDURE — G8400 PT W/DXA NO RESULTS DOC: HCPCS | Performed by: SURGERY

## 2019-11-04 PROCEDURE — 1090F PRES/ABSN URINE INCON ASSESS: CPT | Performed by: SURGERY

## 2019-11-04 PROCEDURE — 3017F COLORECTAL CA SCREEN DOC REV: CPT | Performed by: SURGERY

## 2019-11-04 ASSESSMENT — ENCOUNTER SYMPTOMS
CONSTIPATION: 0
APNEA: 0
SHORTNESS OF BREATH: 0
EYE REDNESS: 0
FACIAL SWELLING: 0
ANAL BLEEDING: 0
COLOR CHANGE: 0
BLOOD IN STOOL: 0
CHEST TIGHTNESS: 0
SINUS PRESSURE: 0
EYE DISCHARGE: 0
RECTAL PAIN: 0
TROUBLE SWALLOWING: 0
ABDOMINAL PAIN: 0
BACK PAIN: 0
SORE THROAT: 0
RHINORRHEA: 0
DIARRHEA: 0
STRIDOR: 0
WHEEZING: 0
ABDOMINAL DISTENTION: 0
EYE ITCHING: 0
NAUSEA: 0
CHOKING: 0
VOMITING: 0
VOICE CHANGE: 0
EYE PAIN: 0
COUGH: 0
PHOTOPHOBIA: 0

## 2019-11-06 ENCOUNTER — ANESTHESIA EVENT (OUTPATIENT)
Dept: OPERATING ROOM | Age: 69
End: 2019-11-06
Payer: MEDICARE

## 2019-11-07 ENCOUNTER — APPOINTMENT (OUTPATIENT)
Dept: WOMENS IMAGING | Age: 69
End: 2019-11-07
Attending: SURGERY
Payer: MEDICARE

## 2019-11-07 ENCOUNTER — ANESTHESIA (OUTPATIENT)
Dept: OPERATING ROOM | Age: 69
End: 2019-11-07
Payer: MEDICARE

## 2019-11-07 ENCOUNTER — HOSPITAL ENCOUNTER (OUTPATIENT)
Dept: WOMENS IMAGING | Age: 69
Discharge: HOME | End: 2019-11-07
Payer: MEDICARE

## 2019-11-07 ENCOUNTER — HOSPITAL ENCOUNTER (OUTPATIENT)
Age: 69
Setting detail: OUTPATIENT SURGERY
Discharge: HOME | End: 2019-11-07
Attending: SURGERY | Admitting: SURGERY
Payer: MEDICARE

## 2019-11-07 ENCOUNTER — HOSPITAL ENCOUNTER (OUTPATIENT)
Dept: WOMENS IMAGING | Age: 69
Discharge: HOME | End: 2019-11-07
Attending: SURGERY
Payer: MEDICARE

## 2019-11-07 VITALS
HEART RATE: 60 BPM | DIASTOLIC BLOOD PRESSURE: 62 MMHG | RESPIRATION RATE: 15 BRPM | SYSTOLIC BLOOD PRESSURE: 123 MMHG | WEIGHT: 159.6 LBS | BODY MASS INDEX: 27.4 KG/M2 | TEMPERATURE: 97.6 F | OXYGEN SATURATION: 98 %

## 2019-11-07 VITALS — HEIGHT: 64 IN | BODY MASS INDEX: 27.25 KG/M2 | WEIGHT: 159.6 LBS

## 2019-11-07 VITALS — DIASTOLIC BLOOD PRESSURE: 46 MMHG | SYSTOLIC BLOOD PRESSURE: 85 MMHG | OXYGEN SATURATION: 98 %

## 2019-11-07 DIAGNOSIS — N60.92 ATYPICAL LOBULAR HYPERPLASIA (ALH) OF LEFT BREAST: ICD-10-CM

## 2019-11-07 DIAGNOSIS — D48.62 NEOPLASM OF UNCERTAIN BEHAVIOR OF LEFT BREAST: ICD-10-CM

## 2019-11-07 DIAGNOSIS — Z98.890 S/P LEFT BREAST BIOPSY: Primary | ICD-10-CM

## 2019-11-07 PROCEDURE — 88342 IMHCHEM/IMCYTCHM 1ST ANTB: CPT

## 2019-11-07 PROCEDURE — 19283 PERQ DEV BREAST 1ST STRTCTC: CPT

## 2019-11-07 PROCEDURE — C1769 GUIDE WIRE: HCPCS

## 2019-11-07 PROCEDURE — 88307 TISSUE EXAM BY PATHOLOGIST: CPT

## 2019-11-07 PROCEDURE — 2709999900 HC NON-CHARGEABLE SUPPLY: Performed by: SURGERY

## 2019-11-07 PROCEDURE — 2780000010 HC IMPLANT OTHER: Performed by: SURGERY

## 2019-11-07 PROCEDURE — 2500000003 HC RX 250 WO HCPCS: Performed by: SURGERY

## 2019-11-07 PROCEDURE — 3700000000 HC ANESTHESIA ATTENDED CARE: Performed by: SURGERY

## 2019-11-07 PROCEDURE — 6360000002 HC RX W HCPCS: Performed by: SURGERY

## 2019-11-07 PROCEDURE — 19283 PERQ DEV BREAST 1ST STRTCTC: CPT | Performed by: SURGERY

## 2019-11-07 PROCEDURE — 2709999900 HC NON-CHARGEABLE SUPPLY

## 2019-11-07 PROCEDURE — 7100000010 HC PHASE II RECOVERY - FIRST 15 MIN: Performed by: SURGERY

## 2019-11-07 PROCEDURE — 6370000000 HC RX 637 (ALT 250 FOR IP): Performed by: SURGERY

## 2019-11-07 PROCEDURE — 19125 EXCISION BREAST LESION: CPT | Performed by: SURGERY

## 2019-11-07 PROCEDURE — 2580000003 HC RX 258: Performed by: SURGERY

## 2019-11-07 PROCEDURE — 76098 X-RAY EXAM SURGICAL SPECIMEN: CPT

## 2019-11-07 PROCEDURE — 3600000012 HC SURGERY LEVEL 2 ADDTL 15MIN: Performed by: SURGERY

## 2019-11-07 PROCEDURE — 7100000011 HC PHASE II RECOVERY - ADDTL 15 MIN: Performed by: SURGERY

## 2019-11-07 PROCEDURE — 77065 DX MAMMO INCL CAD UNI: CPT

## 2019-11-07 PROCEDURE — 3700000001 HC ADD 15 MINUTES (ANESTHESIA): Performed by: SURGERY

## 2019-11-07 PROCEDURE — 3600000002 HC SURGERY LEVEL 2 BASE: Performed by: SURGERY

## 2019-11-07 PROCEDURE — 88341 IMHCHEM/IMCYTCHM EA ADD ANTB: CPT

## 2019-11-07 PROCEDURE — 6360000002 HC RX W HCPCS: Performed by: ANESTHESIOLOGY

## 2019-11-07 RX ORDER — HYDROCODONE BITARTRATE AND ACETAMINOPHEN 5; 325 MG/1; MG/1
2 TABLET ORAL EVERY 4 HOURS PRN
Status: DISCONTINUED | OUTPATIENT
Start: 2019-11-07 | End: 2019-11-07 | Stop reason: HOSPADM

## 2019-11-07 RX ORDER — MIDAZOLAM HYDROCHLORIDE 1 MG/ML
INJECTION INTRAMUSCULAR; INTRAVENOUS PRN
Status: DISCONTINUED | OUTPATIENT
Start: 2019-11-07 | End: 2019-11-07 | Stop reason: SDUPTHER

## 2019-11-07 RX ORDER — KETOROLAC TROMETHAMINE 30 MG/ML
15 INJECTION, SOLUTION INTRAMUSCULAR; INTRAVENOUS ONCE
Status: COMPLETED | OUTPATIENT
Start: 2019-11-07 | End: 2019-11-07

## 2019-11-07 RX ORDER — HYDROCODONE BITARTRATE AND ACETAMINOPHEN 5; 325 MG/1; MG/1
1 TABLET ORAL EVERY 4 HOURS PRN
Status: DISCONTINUED | OUTPATIENT
Start: 2019-11-07 | End: 2019-11-07 | Stop reason: HOSPADM

## 2019-11-07 RX ORDER — FENTANYL CITRATE 50 UG/ML
INJECTION, SOLUTION INTRAMUSCULAR; INTRAVENOUS PRN
Status: DISCONTINUED | OUTPATIENT
Start: 2019-11-07 | End: 2019-11-07 | Stop reason: SDUPTHER

## 2019-11-07 RX ORDER — SODIUM CHLORIDE 9 MG/ML
INJECTION, SOLUTION INTRAVENOUS CONTINUOUS
Status: DISCONTINUED | OUTPATIENT
Start: 2019-11-07 | End: 2019-11-07 | Stop reason: HOSPADM

## 2019-11-07 RX ORDER — SODIUM CHLORIDE 0.9 % (FLUSH) 0.9 %
10 SYRINGE (ML) INJECTION PRN
Status: DISCONTINUED | OUTPATIENT
Start: 2019-11-07 | End: 2019-11-07 | Stop reason: HOSPADM

## 2019-11-07 RX ORDER — PROPOFOL 10 MG/ML
INJECTION, EMULSION INTRAVENOUS CONTINUOUS PRN
Status: DISCONTINUED | OUTPATIENT
Start: 2019-11-07 | End: 2019-11-07 | Stop reason: SDUPTHER

## 2019-11-07 RX ORDER — HYDROCODONE BITARTRATE AND ACETAMINOPHEN 5; 325 MG/1; MG/1
1 TABLET ORAL EVERY 6 HOURS PRN
Qty: 20 TABLET | Refills: 0 | Status: SHIPPED | OUTPATIENT
Start: 2019-11-07 | End: 2019-11-12

## 2019-11-07 RX ORDER — SODIUM CHLORIDE 0.9 % (FLUSH) 0.9 %
10 SYRINGE (ML) INJECTION EVERY 12 HOURS SCHEDULED
Status: DISCONTINUED | OUTPATIENT
Start: 2019-11-07 | End: 2019-11-07 | Stop reason: HOSPADM

## 2019-11-07 RX ORDER — ACETAMINOPHEN 500 MG
1000 TABLET ORAL ONCE
Status: COMPLETED | OUTPATIENT
Start: 2019-11-07 | End: 2019-11-07

## 2019-11-07 RX ADMIN — ACETAMINOPHEN 1000 MG: 500 TABLET ORAL at 08:02

## 2019-11-07 RX ADMIN — Medication 2 G: at 08:19

## 2019-11-07 RX ADMIN — FENTANYL CITRATE 100 MCG: 50 INJECTION INTRAMUSCULAR; INTRAVENOUS at 08:12

## 2019-11-07 RX ADMIN — PROPOFOL 100 MCG/KG/MIN: 10 INJECTION, EMULSION INTRAVENOUS at 08:12

## 2019-11-07 RX ADMIN — SODIUM CHLORIDE: 9 INJECTION, SOLUTION INTRAVENOUS at 07:58

## 2019-11-07 RX ADMIN — KETOROLAC TROMETHAMINE 15 MG: 30 INJECTION, SOLUTION INTRAMUSCULAR at 08:01

## 2019-11-07 RX ADMIN — MIDAZOLAM HYDROCHLORIDE 2 MG: 1 INJECTION, SOLUTION INTRAMUSCULAR; INTRAVENOUS at 08:12

## 2019-11-07 RX ADMIN — SODIUM CHLORIDE: 9 INJECTION, SOLUTION INTRAVENOUS at 08:13

## 2019-11-07 ASSESSMENT — PULMONARY FUNCTION TESTS
PIF_VALUE: 0
PIF_VALUE: 1
PIF_VALUE: 0
PIF_VALUE: 1
PIF_VALUE: 0
PIF_VALUE: 1
PIF_VALUE: 0

## 2019-11-07 ASSESSMENT — PAIN SCALES - GENERAL
PAINLEVEL_OUTOF10: 0

## 2019-11-07 ASSESSMENT — PAIN - FUNCTIONAL ASSESSMENT: PAIN_FUNCTIONAL_ASSESSMENT: 0-10

## 2019-11-08 ENCOUNTER — TELEPHONE (OUTPATIENT)
Dept: SURGERY | Age: 69
End: 2019-11-08

## 2019-11-11 ENCOUNTER — TELEPHONE (OUTPATIENT)
Dept: SPIRITUAL SERVICES | Facility: CLINIC | Age: 69
End: 2019-11-11

## 2019-11-13 ENCOUNTER — OFFICE VISIT (OUTPATIENT)
Dept: SURGERY | Age: 69
End: 2019-11-13

## 2019-11-13 VITALS
HEART RATE: 69 BPM | DIASTOLIC BLOOD PRESSURE: 62 MMHG | BODY MASS INDEX: 27.31 KG/M2 | SYSTOLIC BLOOD PRESSURE: 110 MMHG | OXYGEN SATURATION: 94 % | WEIGHT: 160 LBS | TEMPERATURE: 95.3 F | RESPIRATION RATE: 18 BRPM | HEIGHT: 64 IN

## 2019-11-13 DIAGNOSIS — Z98.890 S/P LEFT BREAST BIOPSY: Primary | ICD-10-CM

## 2019-11-13 PROCEDURE — 99024 POSTOP FOLLOW-UP VISIT: CPT | Performed by: SURGERY

## 2019-11-27 ENCOUNTER — TELEPHONE (OUTPATIENT)
Dept: SURGERY | Age: 69
End: 2019-11-27

## 2019-11-27 ENCOUNTER — TELEPHONE (OUTPATIENT)
Dept: ONCOLOGY | Age: 69
End: 2019-11-27

## 2019-12-03 ENCOUNTER — TELEPHONE (OUTPATIENT)
Dept: FAMILY MEDICINE CLINIC | Age: 69
End: 2019-12-03

## 2019-12-26 ENCOUNTER — NURSE ONLY (OUTPATIENT)
Dept: FAMILY MEDICINE CLINIC | Age: 69
End: 2019-12-26
Payer: MEDICARE

## 2019-12-26 DIAGNOSIS — M81.0 AGE-RELATED OSTEOPOROSIS WITHOUT CURRENT PATHOLOGICAL FRACTURE: ICD-10-CM

## 2019-12-26 DIAGNOSIS — M81.0 SENILE OSTEOPOROSIS: Primary | ICD-10-CM

## 2019-12-26 PROCEDURE — 96372 THER/PROPH/DIAG INJ SC/IM: CPT | Performed by: FAMILY MEDICINE

## 2020-06-09 ENCOUNTER — TELEPHONE (OUTPATIENT)
Dept: FAMILY MEDICINE CLINIC | Age: 70
End: 2020-06-09

## 2020-06-22 ENCOUNTER — PATIENT MESSAGE (OUTPATIENT)
Dept: FAMILY MEDICINE CLINIC | Age: 70
End: 2020-06-22

## 2020-06-22 NOTE — TELEPHONE ENCOUNTER
From: Clarissa Strange  To: Gisele Billings MD  Sent: 6/22/2020 12:17 PM EDT  Subject: Non-Urgent Medical Question    Per your voicemail to me on 6/22/20 please include my Prolia shot at my   wellness appointment on 7/1/20.      Thank you

## 2020-07-01 ENCOUNTER — OFFICE VISIT (OUTPATIENT)
Dept: FAMILY MEDICINE CLINIC | Age: 70
End: 2020-07-01
Payer: MEDICARE

## 2020-07-01 VITALS
WEIGHT: 151.2 LBS | SYSTOLIC BLOOD PRESSURE: 145 MMHG | HEART RATE: 93 BPM | HEIGHT: 66 IN | DIASTOLIC BLOOD PRESSURE: 86 MMHG | BODY MASS INDEX: 24.3 KG/M2 | TEMPERATURE: 97.1 F

## 2020-07-01 PROCEDURE — 1123F ACP DISCUSS/DSCN MKR DOCD: CPT | Performed by: FAMILY MEDICINE

## 2020-07-01 PROCEDURE — 3017F COLORECTAL CA SCREEN DOC REV: CPT | Performed by: FAMILY MEDICINE

## 2020-07-01 PROCEDURE — 1036F TOBACCO NON-USER: CPT | Performed by: FAMILY MEDICINE

## 2020-07-01 PROCEDURE — 36415 COLL VENOUS BLD VENIPUNCTURE: CPT | Performed by: FAMILY MEDICINE

## 2020-07-01 PROCEDURE — G8400 PT W/DXA NO RESULTS DOC: HCPCS | Performed by: FAMILY MEDICINE

## 2020-07-01 PROCEDURE — G9899 SCRN MAM PERF RSLTS DOC: HCPCS | Performed by: FAMILY MEDICINE

## 2020-07-01 PROCEDURE — G8420 CALC BMI NORM PARAMETERS: HCPCS | Performed by: FAMILY MEDICINE

## 2020-07-01 PROCEDURE — 99214 OFFICE O/P EST MOD 30 MIN: CPT | Performed by: FAMILY MEDICINE

## 2020-07-01 PROCEDURE — G8427 DOCREV CUR MEDS BY ELIG CLIN: HCPCS | Performed by: FAMILY MEDICINE

## 2020-07-01 PROCEDURE — 96372 THER/PROPH/DIAG INJ SC/IM: CPT | Performed by: FAMILY MEDICINE

## 2020-07-01 PROCEDURE — 4040F PNEUMOC VAC/ADMIN/RCVD: CPT | Performed by: FAMILY MEDICINE

## 2020-07-01 PROCEDURE — 1090F PRES/ABSN URINE INCON ASSESS: CPT | Performed by: FAMILY MEDICINE

## 2020-07-01 PROCEDURE — G8510 SCR DEP NEG, NO PLAN REQD: HCPCS | Performed by: FAMILY MEDICINE

## 2020-07-01 PROCEDURE — 3288F FALL RISK ASSESSMENT DOCD: CPT | Performed by: FAMILY MEDICINE

## 2020-07-01 RX ORDER — TERBINAFINE HYDROCHLORIDE 250 MG/1
250 TABLET ORAL DAILY
Qty: 90 TABLET | Refills: 1 | Status: SHIPPED | OUTPATIENT
Start: 2020-07-01 | End: 2020-07-27 | Stop reason: ALTCHOICE

## 2020-07-01 ASSESSMENT — PATIENT HEALTH QUESTIONNAIRE - PHQ9
1. LITTLE INTEREST OR PLEASURE IN DOING THINGS: 0
SUM OF ALL RESPONSES TO PHQ QUESTIONS 1-9: 0
2. FEELING DOWN, DEPRESSED OR HOPELESS: 0
SUM OF ALL RESPONSES TO PHQ QUESTIONS 1-9: 0
SUM OF ALL RESPONSES TO PHQ9 QUESTIONS 1 & 2: 0

## 2020-07-01 NOTE — PROGRESS NOTES
Administrations This Visit     denosumab (PROLIA) SC injection 60 mg     Admin Date  07/01/2020  14:08 Action  Given Dose  60 mg Route  Subcutaneous Site  Arm Right Administered By  Coretta Pulido CMA ([facility])    Ordering Provider:  Yajaira Monzon MD    NDC:  99318-023-39    Lot#:  [id number]    :  AMGEN    Patient Supplied?:  No                Patient instructed to remain in clinic for 20 minutes after injection and was advised to report any adverse reaction to me immediately.       ABN SIGNED

## 2020-07-01 NOTE — PROGRESS NOTES
[address] [place] [id number]  Dept: [phone]  Dept Fax: [phone]  Loc: [phone]    Lavelle Partida is a 69 y.o. female who presents today for:  Chief Complaint   Patient presents with    Annual Exam     shot     Nail Problem     toenail fungus           HPI:     HPI    Well Adult Physical: Patient here for a comprehensive physical exam.The patient reports problems - cholesterol and thyroid issues are controlled on current medications. Dexa scan in 2015 shows osteoporosis. Do you take any herbs or supplements that were not prescribed by a doctor? no Are you taking calcium supplements? yes Are you taking aspirin daily? yes   History:  Any STD's in the past? none    Since her last visit, she was diagnosed with a mass in her breast and had a core needle biopsy which was suspicoous and subsequent lumpectomy which showed no cancer. She has a strong family history of breast cancer. Reviewed chart forpast medical history , surgical history , allergies, social history , family history and medications. Health Maintenance   Topic Date Due    Annual Wellness Visit (AWV)  05/29/2019    Flu vaccine (1) 09/01/2020    Lipid screen  07/01/2021    TSH testing  07/01/2021    Breast cancer screen  11/07/2021    DTaP/Tdap/Td vaccine (2 - Td) 08/14/2023    Colon cancer screen colonoscopy  01/29/2026    DEXA (modify frequency per FRAX score)  Completed    Shingles Vaccine  Completed    Pneumococcal 65+ years Vaccine  Completed    Hepatitis C screen  Completed    Hepatitis A vaccine  Aged Out    Hepatitis B vaccine  Aged Out    Hib vaccine  Aged Out    Meningococcal (ACWY) vaccine  Aged Out       Subjective:      Constitutional:Negative for fever, chills, diaphoresis, activity change, appetite change and fatigue.    HENT: Negative for hearing loss, ear pain, congestion, sore throat, rhinorrhea, postnasal No murmur heard. Pulmonary/Chest: Effort normal and breath sounds normal. No respiratory distress. She has no wheezes. She has no rhonchi. She has no rales. Abdominal: Soft. Normal appearance and bowel sounds are normal. She exhibits no distension and no mass. There is no hepatosplenomegaly. No tenderness. She has no rigidity, no rebound and no guarding. No hernia. Musculoskeletal:        Right lower leg: She exhibits no edema. Left lower leg: She exhibits no edema. Neurological: She is alert. Skin:  Toe nails on both great toes are thick and yellow and flaking. The right toe nail has even fallen off. Assessment/Plan   Golden Yan was seen today for annual exam and nail problem. Diagnoses and all orders for this visit:    Senile osteoporosis  -     denosumab (PROLIA) SC injection 60 mg    Pure hypercholesterolemia  -     Comprehensive Metabolic Panel; Future  -     Lipid Panel; Future  -     Comprehensive Metabolic Panel  -     Lipid Panel  -     Hepatic Function Panel; Future    Acquired hypothyroidism  -     TSH With Reflex Ft4; Future  -     TSH With Reflex Ft4  -     Hepatic Function Panel; Future    Atypical lobular hyperplasia (ALH) of left breast  -     MRI Breast Bilateral W WO Contrast; Future    Family history of breast cancer in mother  -     MRI Breast Bilateral W WO Contrast; Future    Family history of breast cancer in sister  -     MRI Breast Bilateral W WO Contrast; Future    Asymptomatic postmenopausal state  -     CBC; Future  -     MAMMO DEXA BONE DENSITY SCAN; Future  -     CBC    History of tobacco abuse  -     Low Dose Chest CT -Abnormal Lung Screen Follow up; Future    Onychomycosis  -     Comprehensive Metabolic Panel; Future  -     terbinafine (LAMISIL) 250 MG tablet; Take 1 tablet by mouth daily  -     Cancel: Hepatic Function Panel;  Future  -     Comprehensive Metabolic Panel  -     Hepatic Function Panel    Other orders  -     Anion Gap  -     Glomerular Filtration Rate, Estimated  -     T4, Free      Continue healthy diet and exercise  Aspirin daily    Use lamisil for 3 months and have the labs done to check the liver function. Then complete the full 6 months of treatment if the LFTS are normal.      Reccommended tobacco cessation options including pharmacologicmethods, counseled great than 3 minutes during this visit:  Yes  [x]  No  []    Patient given educational materials - see patient instructions. Discussed use, benefit, and side effectsof prescribed medications. All patient questions answered. Pt voiced understanding. Reviewed health maintenance. Instructed to continue current medications, diet and exercise. Patient agreed with treatment plan. Followup as directed.      Electronically signed by Cherylene Collum, MD

## 2020-07-02 LAB
ALBUMIN SERPL-MCNC: 4.3 G/DL (ref 3.5–5.1)
ALP BLD-CCNC: 69 U/L (ref 38–126)
ALT SERPL-CCNC: 13 U/L (ref 11–66)
ANION GAP SERPL CALCULATED.3IONS-SCNC: 10 MEQ/L (ref 8–16)
AST SERPL-CCNC: 19 U/L (ref 5–40)
BILIRUB SERPL-MCNC: 0.5 MG/DL (ref 0.3–1.2)
BILIRUBIN DIRECT: < 0.2 MG/DL (ref 0–0.3)
BUN BLDV-MCNC: 13 MG/DL (ref 7–22)
CALCIUM SERPL-MCNC: 10 MG/DL (ref 8.5–10.5)
CHLORIDE BLD-SCNC: 107 MEQ/L (ref 98–111)
CHOLESTEROL, TOTAL: 133 MG/DL (ref 100–199)
CO2: 26 MEQ/L (ref 23–33)
CREAT SERPL-MCNC: 0.7 MG/DL (ref 0.4–1.2)
ERYTHROCYTE [DISTWIDTH] IN BLOOD BY AUTOMATED COUNT: 13.4 % (ref 11.5–14.5)
ERYTHROCYTE [DISTWIDTH] IN BLOOD BY AUTOMATED COUNT: 50.5 FL (ref 35–45)
GFR SERPL CREATININE-BSD FRML MDRD: 82 ML/MIN/1.73M2
GLUCOSE BLD-MCNC: 103 MG/DL (ref 70–108)
HCT VFR BLD CALC: 50.2 % (ref 37–47)
HDLC SERPL-MCNC: 57 MG/DL
HEMOGLOBIN: 15.3 GM/DL (ref 12–16)
LDL CHOLESTEROL CALCULATED: 59 MG/DL
MCH RBC QN AUTO: 31.2 PG (ref 26–33)
MCHC RBC AUTO-ENTMCNC: 30.5 GM/DL (ref 32.2–35.5)
MCV RBC AUTO: 102.4 FL (ref 81–99)
PLATELET # BLD: 259 THOU/MM3 (ref 130–400)
PMV BLD AUTO: 11.7 FL (ref 9.4–12.4)
POTASSIUM SERPL-SCNC: 4.5 MEQ/L (ref 3.5–5.2)
RBC # BLD: 4.9 MILL/MM3 (ref 4.2–5.4)
SODIUM BLD-SCNC: 143 MEQ/L (ref 135–145)
T4 FREE: 1.14 NG/DL (ref 0.93–1.76)
TOTAL PROTEIN: 7.2 G/DL (ref 6.1–8)
TRIGL SERPL-MCNC: 83 MG/DL (ref 0–199)
TSH SERPL DL<=0.05 MIU/L-ACNC: 0.35 UIU/ML (ref 0.4–4.2)
WBC # BLD: 6.9 THOU/MM3 (ref 4.8–10.8)

## 2020-07-13 ENCOUNTER — TELEPHONE (OUTPATIENT)
Dept: FAMILY MEDICINE CLINIC | Age: 70
End: 2020-07-13

## 2020-07-13 ENCOUNTER — HOSPITAL ENCOUNTER (OUTPATIENT)
Dept: CT IMAGING | Age: 70
Discharge: HOME | End: 2020-07-13
Payer: MEDICARE

## 2020-07-13 PROCEDURE — 71250 CT THORAX DX C-: CPT

## 2020-07-13 NOTE — TELEPHONE ENCOUNTER
Keke from [facility] radiology calling regarding CT lung screening order. She voiced that pt does not qualify. Pt is currently at [facility] and wants to get screening done because she saw a billboard that states if you smoked ever, you should get screened. Keke voiced that they suggest getting a CT chest . Order entered.

## 2020-07-15 ENCOUNTER — HOSPITAL ENCOUNTER (OUTPATIENT)
Dept: WOMENS IMAGING | Age: 70
Discharge: HOME | End: 2020-07-15
Payer: MEDICARE

## 2020-07-15 ENCOUNTER — HOSPITAL ENCOUNTER (OUTPATIENT)
Dept: MRI IMAGING | Age: 70
Discharge: HOME | End: 2020-07-15
Payer: MEDICARE

## 2020-07-15 PROCEDURE — A9579 GAD-BASE MR CONTRAST NOS,1ML: HCPCS | Performed by: FAMILY MEDICINE

## 2020-07-15 PROCEDURE — 77080 DXA BONE DENSITY AXIAL: CPT

## 2020-07-15 PROCEDURE — 77049 MRI BREAST C-+ W/CAD BI: CPT

## 2020-07-15 PROCEDURE — 6360000004 HC RX CONTRAST MEDICATION: Performed by: FAMILY MEDICINE

## 2020-07-15 RX ADMIN — GADOTERIDOL 15 ML: 279.3 INJECTION, SOLUTION INTRAVENOUS at 14:55

## 2020-07-17 ENCOUNTER — TELEPHONE (OUTPATIENT)
Dept: FAMILY MEDICINE CLINIC | Age: 70
End: 2020-07-17

## 2020-07-17 NOTE — TELEPHONE ENCOUNTER
MRI shows a spot on the right breast   Ultrasound recommended  Also will need a MRI guided biopsy if indicated  Call women's wellness and ask if MRI biopsy order needs placed now or to wait until the ultrasound is completed.     Call patient

## 2020-07-17 NOTE — TELEPHONE ENCOUNTER
Per women's wellness they are contacting patient for the US to be scheduled and if needs to have the MRI will send the orders over to Dr. Satish Jones for signature. No concerns voiced.

## 2020-07-23 ENCOUNTER — HOSPITAL ENCOUNTER (OUTPATIENT)
Dept: WOMENS IMAGING | Age: 70
Discharge: HOME | End: 2020-07-23
Payer: MEDICARE

## 2020-07-23 PROCEDURE — 88377 M/PHMTRC ALYS ISHQUANT/SEMIQ: CPT

## 2020-07-23 PROCEDURE — 76642 ULTRASOUND BREAST LIMITED: CPT

## 2020-07-23 PROCEDURE — 77065 DX MAMMO INCL CAD UNI: CPT

## 2020-07-23 PROCEDURE — 88360 TUMOR IMMUNOHISTOCHEM/MANUAL: CPT

## 2020-07-23 PROCEDURE — C1894 INTRO/SHEATH, NON-LASER: HCPCS

## 2020-07-23 PROCEDURE — 88305 TISSUE EXAM BY PATHOLOGIST: CPT

## 2020-07-23 NOTE — PROGRESS NOTES
The patient was counseled at length about the risks of robyn Covid-19 during their perioperative period and any recovery window from their procedure. The patient was made aware that robyn Covid-19  may worsen their prognosis for recovering from their procedure  and lend to a higher morbidity and/or mortality risk. All material risks, benefits, and reasonable alternatives including postponing the procedure were discussed. The patient does wish to proceed with the procedure at this time. Formulation and discussion of sedation / procedure plans, risks, benefits, side effects and alternatives with patient and/or responsible adult completed.     Electronically signed by Farshad Mandujano MD on 7/23/2020 at 10:46 AM

## 2020-07-24 ENCOUNTER — CLINICAL DOCUMENTATION (OUTPATIENT)
Dept: WOMENS IMAGING | Age: 70
End: 2020-07-24

## 2020-07-24 NOTE — PROGRESS NOTES
Contact Type: [facility] [address]    Diagnosis:Invasive Ductal Carcinoma    Home Disposition: Lives with her     Contact Information: Arrived by herslef for biopsy results. Dr. Qi Sevilla discussed pathology and recommended breast clinic. Encouraged Breast Clinic attendance and informed that [facility] will give apt information. All cards given for surgeons and oncologists. Wants to see Dr(s): Kylah. Appt. info. below. Requests Referral to Doctor(s) with appointment(s): Patient has seen Dr. Mo Purcell before and wants to see him. Appointment was made for Monday July 27th and 8:30 am    Additional Referral(s): Tegan Santiago/Monique Orellana: [facility],        Biopsy site status: doing very well    Teaching Sheets provided: Cancer Type: IDC, Breast Cancer, Navigation Packet, Nurse Navigation contact information, Breast Clinic appt and map     Currently on HRT: no     Notes:  Alex Junior is aware of the clinic. She was at one last year when they initially thought she had breast cancer and it turned out to be [facility]. She is unsure at this point if she wants to go to clinic. She mentioned that it was intimidating. She was going to think about it and talk to Dr. Mo Purclel.

## 2020-07-27 ENCOUNTER — OFFICE VISIT (OUTPATIENT)
Dept: SURGERY | Age: 70
End: 2020-07-27
Payer: MEDICARE

## 2020-07-27 ENCOUNTER — TELEPHONE (OUTPATIENT)
Dept: SURGERY | Age: 70
End: 2020-07-27

## 2020-07-27 VITALS
HEIGHT: 66 IN | WEIGHT: 153 LBS | HEART RATE: 90 BPM | TEMPERATURE: 96.5 F | SYSTOLIC BLOOD PRESSURE: 120 MMHG | RESPIRATION RATE: 18 BRPM | BODY MASS INDEX: 24.59 KG/M2 | OXYGEN SATURATION: 98 % | DIASTOLIC BLOOD PRESSURE: 62 MMHG

## 2020-07-27 PROBLEM — C50.411 MALIGNANT NEOPLASM OF UPPER-OUTER QUADRANT OF RIGHT BREAST IN FEMALE, ESTROGEN RECEPTOR POSITIVE (HCC): Status: ACTIVE | Noted: 2020-07-27

## 2020-07-27 PROBLEM — Z17.0 MALIGNANT NEOPLASM OF UPPER-OUTER QUADRANT OF RIGHT BREAST IN FEMALE, ESTROGEN RECEPTOR POSITIVE (HCC): Status: ACTIVE | Noted: 2020-07-27

## 2020-07-27 PROCEDURE — G8427 DOCREV CUR MEDS BY ELIG CLIN: HCPCS | Performed by: SURGERY

## 2020-07-27 PROCEDURE — 3017F COLORECTAL CA SCREEN DOC REV: CPT | Performed by: SURGERY

## 2020-07-27 PROCEDURE — 99215 OFFICE O/P EST HI 40 MIN: CPT | Performed by: SURGERY

## 2020-07-27 PROCEDURE — G8399 PT W/DXA RESULTS DOCUMENT: HCPCS | Performed by: SURGERY

## 2020-07-27 PROCEDURE — G8417 CALC BMI ABV UP PARAM F/U: HCPCS | Performed by: SURGERY

## 2020-07-27 PROCEDURE — 4040F PNEUMOC VAC/ADMIN/RCVD: CPT | Performed by: SURGERY

## 2020-07-27 PROCEDURE — G9899 SCRN MAM PERF RSLTS DOC: HCPCS | Performed by: SURGERY

## 2020-07-27 PROCEDURE — 1123F ACP DISCUSS/DSCN MKR DOCD: CPT | Performed by: SURGERY

## 2020-07-27 PROCEDURE — 1036F TOBACCO NON-USER: CPT | Performed by: SURGERY

## 2020-07-27 PROCEDURE — 1090F PRES/ABSN URINE INCON ASSESS: CPT | Performed by: SURGERY

## 2020-07-27 RX ORDER — ATORVASTATIN CALCIUM 20 MG/1
TABLET, FILM COATED ORAL
Qty: 90 TABLET | Refills: 3 | Status: SHIPPED | OUTPATIENT
Start: 2020-07-27 | End: 2021-06-10

## 2020-07-27 ASSESSMENT — ENCOUNTER SYMPTOMS
BLOOD IN STOOL: 0
ABDOMINAL DISTENTION: 0
CONSTIPATION: 0
RECTAL PAIN: 0
SORE THROAT: 0
STRIDOR: 0
VOICE CHANGE: 0
EYE PAIN: 0
EYE ITCHING: 0
EYE REDNESS: 0
APNEA: 0
ANAL BLEEDING: 0
CHEST TIGHTNESS: 0
NAUSEA: 0
PHOTOPHOBIA: 0
FACIAL SWELLING: 0
COLOR CHANGE: 0
SHORTNESS OF BREATH: 0
DIARRHEA: 0
VOMITING: 0
EYE DISCHARGE: 0
WHEEZING: 0
ABDOMINAL PAIN: 0
RHINORRHEA: 0
SINUS PRESSURE: 0
CHOKING: 0
TROUBLE SWALLOWING: 0
COUGH: 0
BACK PAIN: 0

## 2020-07-27 NOTE — LETTER
[facility]  Zainab Arizmendi MD FACS  Phone- [phone]  Fax [phone]    Pt Name: Qamar Roe Record Number: [id number]  Date of Birth 1950   Today's Date: 7/27/2020    Lazara Rowan was evaluated in the office today. My assessment and plans are listed below. Assessment:     Amisha Regalado was seen today for surgical consult. Diagnoses and all orders for this visit:    S/P left breast biopsy    Malignant neoplasm of upper-outer quadrant of right breast in female, estrogen receptor positive ([facility] [address]  -     US PLACE BREAST LOC DEVICE 1ST LESION RIGHT; Future  -     [facility] DIGITAL DIAGNOSTIC W OR WO CAD RIGHT; Standing  -     MASTECTOMY PARTIAL / LUMPECTOMY; Future  -     COVID-19 Ambulatory; Future  -     Ambulatory Referral To [facility], [facility], [facility], [employer], [address]         Plan:  1. Schedule Amisha Knife for  1.  US needle loc RIGHT  2. RIGHT LUMPECTOMY  3. If HER-2 comes back amplified we would then add on a sentinel lymph node biopsy and then she would have to have radiation post procedure. 4. In addition if she is HER-2 positive over 6 mm she would need adjuvant chemotherapy after surgery. 2. In the office, I had a discussion with the patient regarding the treatment options for invasive breast cancer. We discussed lumpectomy and radiation versus mastectomy. We discussed the fact that there is no statistical difference in long term survival or recurrence between the two options. 3. Candidate for Lumpectomy YES/NO: Yes  4. Candidate for omission of sln bx YES/NO: Yes  5. Candidate for potential omission of radiation YES/NO: Yes  6. We had a long discussion in the office regarding treatment options for breast cancer. We discussed lumpectomy and radiation versus simple mastectomy with or without reconstruction.  For lumpectomy, we discussed lymph node dissection. We discussed the complications of axillary lymph node dissection and how the information is used in treatment decisions and prognosis. We discussed how the sentinel lymph node is identified and its significance. We discussed the possibility of not finding the sentinel node as well as a 5% false negative rate. 10.  We discussed the ACOSOG  Z011 trial where even if sln is positive, no benefit for further axillary surgery has been shown in lumpectomy in patients getting radiation. 11. We also covered adjuvant chemotherapy and radiation therapy if they would be required. After these discussions, the patient's questions were answered and has elected to proceed with surgery. 12. Her need for genetic testing referral was calculated by questions asked during her mammograms and she was is deemed to be a candidate for testing. For those who are deemed appropriate, referral to the genetic counseling service at [address] [place] [address] was made. Harvey Zaidi the following criteria for further genetic risk evaluation based on NCCN guidelines:  ? Breast Cancer diagnosed age 48 years or younger. ? Triple-negative (ER-, GA-, HER2-) breast cancer diagnosed age 61 years or younger. ? Two breast cancer primaries  ? Breast cancer at any age and   o 3 or more close blood relatives with:  ? Breast cancer diagnosis at age 48 years or younger; or  ? Invasive ovarian cancer; or  ? Male breast cancer; or  ? Pancreatic cancer; or  ? High-grade ([place] score greater than or equal to 7) or metastatic prostate cancer. o 2 or more close blood relatives with breast cancer at any age. ? Lobular breast cancer. ? Diffuse gastric cancer  ? Breast cancer, gastrointestinal cancer, or hamartomatous polyps, ovarian sex chord tumors, pancreatic cancer, testicular sertoli cell tumors, or childhood skin pigmentation. 14.   15.   16.    17. Status: outpatient  18.  Planned anesthesia: MAC 23. She will undergo pre-operative clearance per anesthesia guidelines with risk factors listed under the past medical history diagnosis & problem list.  13.  Perioperative discontinuation of        ASA, Plavix, warfarin, Brillinta, Effient, Pradaxa, Eliquis and Xarelto. Continuation of 81 mg Aspirin is acceptable.   14.  Perioperative medical clearance is not        required         Orders Placed This Encounter:  Orders Placed This Encounter   Procedures    MASTECTOMY PARTIAL / LUMPECTOMY     Standing Status:   Future     Standing Expiration Date:   7/27/2021     Order Specific Question:   Pre-procedure Diagnosis     Answer:   Right Breast Cancer    US PLACE BREAST LOC DEVICE 1ST LESION RIGHT     Standing Status:   Future     Standing Expiration Date:   7/27/2021    [facility] DIGITAL DIAGNOSTIC W OR WO CAD RIGHT     Standing Status:   Standing     Number of Occurrences:   1     Standing Expiration Date:   9/27/2021    COVID-19 Ambulatory     Standing Status:   Future     Standing Expiration Date:   7/27/2021     Scheduling Instructions:      Saline media preferred given current shortage of viral transport media but both acceptable     Order Specific Question:   Status     Answer:   Asymptomatic/Surveillance (e.g. pre-op/pre-procedure, pre-delivery, transfer)     Order Specific Question:   Reason for Test     Answer:   Upcoming elective surgery/procedure/delivery, return to work, or discharge to another facility    Ambulatory Referral To Oncology Rehabilitation     Referral Priority:   Routine     Referral Type:   Consult for Advice and Opinion     Referral Reason:   Specialty Services Required     Number of Visits Requested:   [facility], [facility]     Referral Priority:   Urgent     Referral Type:   Eval and Treat     Referral Reason:   Specialty Services Required     Referred to Provider:   Russel Morillo     Requested Specialty:   Genetic Counselor Number of Visits Requested:   1                If I can provide any additional assistance or you have any concerns, please feel free to contact me. Thank you for allowing to participate in the care of your patients. Sincerely,      Marbella Mata MD [facility]  [id number] [facility], [address]  Office: [phone]  Fax: [phone]

## 2020-07-27 NOTE — PROGRESS NOTES
Ortiz Schaefer MD  [facility]  General Surgery  New Patient Evaluation in Office  Pt Name: Tim Chavez  Date of Birth 1950   Today's Date: 7/27/2020  Medical Record Number: [id number]  Referring Provider: No ref. provider found  Primary Care Provider: Bren Green MD  Chief Complaint   Patient presents with   [facility] Surgical Consult     established pt--ref by [facility] for right breast invasive ductal adenocarcinoma     ASSESSMENT      Problem List Items Addressed This Visit     Malignant neoplasm of upper-outer quadrant of right breast in female, estrogen receptor positive ([facility] [address]    Relevant Orders    US PLACE BREAST LOC DEVICE 1ST LESION RIGHT    SACHI DIGITAL DIAGNOSTIC W OR WO CAD RIGHT    MASTECTOMY PARTIAL / LUMPECTOMY    COVID-19 Ambulatory    [facility] [employer], [address] [place]    S/P left breast biopsy - Primary        Past Medical History:   Diagnosis Date    Acquired hypothyroidism 5/25/2017    Atypical lobular hyperplasia ([place]) of left breast 11/4/2019    Hyperlipidemia     Invasive ductal carcinoma of breast ([facility] [address] 07/2020    right    Osteopenia 5/25/2017    Osteoporosis 12/29/2016          Cancer Staging  Malignant neoplasm of upper-outer quadrant of right breast in female, estrogen receptor positive ([facility] [address]  Staging form: Breast, AJCC 8th Edition  - Clinical stage from 7/27/2020: Stage IA (cT1c, cN0, cM0, G2, ER+, MN+, HER2: Equivocal) - Signed by Ortiz Schaefer MD on 7/27/2020     PLANS      1. [facility]  1.  US needle loc RIGHT  2. RIGHT LUMPECTOMY  3. If HER-2 comes back amplified we would then add on a sentinel lymph node biopsy and then she would have to have radiation post procedure. 4. In addition if she is HER-2 positive over 6 mm she would need adjuvant chemotherapy after surgery. 2. In the office, I had a discussion with the patient regarding the treatment options for invasive breast cancer.  We conduct of the operation, the use of general anesthesia, the typical post op course and cosmetic outcome. We also covered reconstruction options both immediate and delayed and referral was made if the patient desired reconstruction, or the use of a bra prosthesis. We also covered the possible complications including bleeding, infection, skin slough, seroma formation and others. 9. We also discussed staging and the importance of lymph node sampling. We discussed the use of sentinel lymph node biopsy versus standard axillary lymph node dissection. We discussed the complications of axillary lymph node dissection and how the information is used in treatment decisions and prognosis. We discussed how the sentinel lymph node is identified and its significance. We discussed the possibility of not finding the sentinel node as well as a 5% false negative rate. 10.  We discussed the ACOSOG  Z011 trial where even if sln is positive, no benefit for further axillary surgery has been shown in lumpectomy in patients getting radiation. 11. We also covered adjuvant chemotherapy and radiation therapy if they would be required. After these discussions, the patient's questions were answered and has elected to proceed with surgery. 12. Her need for genetic testing referral was calculated by questions asked during her mammograms and she was is deemed to be a candidate for testing. For those who are deemed appropriate, referral to the genetic counseling service at [address] [place] [address] was made. Harvey Zaidi the following criteria for further genetic risk evaluation based on NCCN guidelines:   Breast Cancer diagnosed age 48 years or younger.  Triple-negative (ER-, MI-, HER2-) breast cancer diagnosed age 61 years or younger.    Two breast cancer primaries   Breast cancer at any age and   o 3 or more close blood relatives with:  - Breast cancer diagnosis at age 48 years or younger; or  - Invasive ovarian cancer; or  - Male breast cancer; or  - Pancreatic cancer; or  - High-grade (Sonia score greater than or equal to 7) or metastatic prostate cancer. o 2 or more close blood relatives with breast cancer at any age.  Lobular breast cancer.  Diffuse gastric cancer   Breast cancer, gastrointestinal cancer, or hamartomatous polyps, ovarian sex chord tumors, pancreatic cancer, testicular sertoli cell tumors, or childhood skin pigmentation. 14.   15.   16.    17. Status: outpatient  18. Planned anesthesia: MAC  19. She will undergo pre-operative clearance per anesthesia guidelines with risk factors listed under the past medical history diagnosis & problem list.  13.  Perioperative discontinuation of        ASA, Plavix, warfarin, Brillinta, Effient, Pradaxa, Eliquis and Xarelto. Continuation of 81 mg Aspirin is acceptable.   14.  Perioperative medical clearance is not        required         Orders Placed This Encounter:  Orders Placed This Encounter   Procedures    MASTECTOMY PARTIAL / LUMPECTOMY     Standing Status:   Future     Standing Expiration Date:   7/27/2021     Order Specific Question:   Pre-procedure Diagnosis     Answer:   Right Breast Cancer    US PLACE BREAST LOC DEVICE 1ST LESION RIGHT     Standing Status:   Future     Standing Expiration Date:   7/27/2021    SACHI DIGITAL DIAGNOSTIC W OR WO CAD RIGHT     Standing Status:   Standing     Number of Occurrences:   1     Standing Expiration Date:   9/27/2021    COVID-19 Ambulatory     Standing Status:   Future     Standing Expiration Date:   7/27/2021     Scheduling Instructions:      Saline media preferred given current shortage of viral transport media but both acceptable     Order Specific Question:   Status     Answer:   Asymptomatic/Surveillance (e.g. pre-op/pre-procedure, pre-delivery, transfer)     Order Specific Question:   Reason for Test     Answer:   Upcoming elective surgery/procedure/delivery, return to work, or discharge to another facility    Ambulatory Referral To Oncology Rehabilitation     Referral Priority:   Routine     Referral Type:   Consult for Advice and Opinion     Referral Reason:   Specialty Services Required     Number of Visits Requested:   [facility], [facility], [facility], [employer], [address]     Referral Priority:   Urgent     Referral Type:   Eval and Treat     Referral Reason:   Specialty Services Required     Referred to Provider:   Renata Hernandez     Requested Specialty:   Genetic Counselor     Number of Visits Requested:   Bernie Avina is a 69 y.o.  female seen in the office for evaluation of breast cancer. She was referred for evaluation and discussion of treatment options for carcinoma of the right UOQ. she is had core biopsy, right demonstrating an intermediate grade invasive ductal cancer of the right breast. Estrogen receptor status is positive. Progesterone receptor status is positive. HER-2/fernie receptors stain 2+ by IHC. Prior to the biopsy she complained of prior left breast iopsy 11/2019 which was [facility] and no breast symptoms and denied galactorrhea, new or changing breast lumps, nipple changes and nipple discharge. Had a BBDRx 11/2019 on [facility] which did not put her at high risk. Risk assessment: mom with breast CA and sister with breast CA. She has not been on previous estrogen therapy. Sister tested negative 10 years ago. No results found.       Past Medical History  Past Medical History:   Diagnosis Date    Acquired hypothyroidism 5/25/2017    Atypical lobular hyperplasia [facility]) of left breast 11/4/2019    Hyperlipidemia     Invasive ductal carcinoma of breast ([facility] [address] 07/2020    right    Osteopenia 5/25/2017    Osteoporosis 12/29/2016       Past Surgical History  Past Surgical History:   Procedure Laterality Date    APPENDECTOMY  1997    BREAST SURGERY Left 11/7/2019    LEFT BREAST EXCISIONAL BIOPSY WITH NEEDLE LOCALIZATION PLACEMENT performed by Estefani Pro MD at [facility]    ruptured ovarian cyst    SACHI US GUID NDL BIOPSY RIGHT  7/23/2020    SACHI US GUID NDL BIOPSY RIGHT 7/23/2020 Pamela Sykes MD [facility]    TONSILLECTOMY  child      Family History  Family History   Problem Relation Age of Onset    Diabetes Mother     Heart Disease Mother         afib and pacer    High Blood Pressure Mother     Breast Cancer Mother 46    Breast Cancer Sister 62    Dementia Maternal Uncle         alzheimer's    Dementia Maternal Aunt         alzheimer's    Colon Cancer Niece 39    COPD Father     No Known Problems Brother     No Known Problems Brother     Ovarian Cancer Neg Hx      Cancer-related family history includes Breast Cancer (age of onset: 46) in her mother; Breast Cancer (age of onset: 62) in her sister; Colon Cancer (age of onset: 39) in her niece. There is no history of Ovarian Cancer. Medications  Outpatient Encounter Medications as of 7/27/2020   Medication Sig Dispense Refill    atorvastatin (LIPITOR) 20 MG tablet TAKE 1 TABLET EVERY DAY 90 tablet 3    aspirin EC 81 MG EC tablet Take 1 tablet by mouth daily 30 tablet 3    [DISCONTINUED] terbinafine (LAMISIL) 250 MG tablet Take 1 tablet by mouth daily (Patient not taking: Reported on 7/27/2020) 90 tablet 1     No facility-administered encounter medications on file as of 7/27/2020. Current Outpatient Medications   Medication Sig Dispense Refill    atorvastatin (LIPITOR) 20 MG tablet TAKE 1 TABLET EVERY DAY 90 tablet 3    aspirin EC 81 MG EC tablet Take 1 tablet by mouth daily 30 tablet 3     No current facility-administered medications for this visit. Allergies  No Known Allergies    Social History  Social History     Socioeconomic History    Marital status:       Spouse name: Not on file    Number of children: 0    Years of education: Not on file    Highest education level: Not on file   Occupational History    Occupation: retired appetite change, chills, diaphoresis, fatigue, fever and unexpected weight change. HENT: Negative for congestion, dental problem, drooling, ear discharge, ear pain, facial swelling, hearing loss, mouth sores, nosebleeds, postnasal drip, rhinorrhea, sinus pressure, sneezing, sore throat, tinnitus, trouble swallowing and voice change. Eyes: Negative for photophobia, pain, discharge, redness, itching and visual disturbance. Respiratory: Negative for apnea, cough, choking, chest tightness, shortness of breath, wheezing and stridor. Cardiovascular: Negative for chest pain, palpitations and leg swelling. Gastrointestinal: Negative for abdominal distention, abdominal pain, anal bleeding, blood in stool, constipation, diarrhea, nausea, rectal pain and vomiting. Endocrine: Negative for cold intolerance, heat intolerance, polydipsia, polyphagia and polyuria. Genitourinary: Negative for decreased urine volume, difficulty urinating, dysuria, enuresis, flank pain, frequency, genital sores, hematuria and urgency. Musculoskeletal: Negative for arthralgias, back pain, gait problem, joint swelling, myalgias, neck pain and neck stiffness. Skin: Negative for color change, pallor, rash and wound. Allergic/Immunologic: Negative for environmental allergies, food allergies and immunocompromised state. Neurological: Negative for dizziness, tremors, seizures, syncope, facial asymmetry, speech difficulty, weakness, light-headedness, numbness and headaches. Hematological: Negative for adenopathy. Does not bruise/bleed easily. Psychiatric/Behavioral: Negative for agitation, behavioral problems, confusion, decreased concentration, dysphoric mood, hallucinations, self-injury, sleep disturbance and suicidal ideas. The patient is not nervous/anxious and is not hyperactive. OBJECTIVE    VITALS:  height is 5' 5.5\" (1.664 m) and weight is 153 lb (69.4 kg). Her temporal temperature is 96.5 °F (35.8 °C).  Her blood pressure is 120/62 and her pulse is 90. Her respiration is 18 and oxygen saturation is 98%. CONSTITUTIONAL: Alert and oriented times 3, no acute distress and cooperative to examination with proper mood and affect. SKIN: Skin color, texture, turgor normal. No rashes or lesions. LYMPH: no cervical nodes, no supraclavicular nodes, no axillary nodes  HEENT: Head is normocephalic, atraumatic. EOMI, PERRLA. NECK: Supple, symmetrical, trachea midline, no adenopathy, thyroid symmetric, not enlarged and no tenderness, skin normal.   BREAST: Right breast examined first there is a biopsy site without hematoma from her needle biopsy. She have a curvilinear periareolar incision from about the 6 to 12 o'clock position from a previous cyst excision in the 80s. There is no palpable masses there is no axillary adenopathy. The left breast is examined she has a previous excisional biopsy scar from her prior surgery November of last year. CHEST/LUNGS: chest symmetric with normal A/P diameter, normal respiratory rate and rhythm, lungs clear to auscultation without wheezes, rales or rhonchi. No accessory muscle use. Scars None   CARDIOVASCULAR: Heart sounds are normal.  Regular rate and rhythm without murmur, gallop or rub. Normal S1 and S2. . Carotid and femoral pulses 2+/4 and equal bilaterally. ABDOMEN: Normal shape. Hysterectomy and appendectomy scar(s) present. Normal bowel sounds. No bruits. Joby Ortega \"soft, nontender, nondistended, no masses or organomegaly. no evidence of hernia. Percussion: Normal without hepatosplenomegally. Tenderness: absent. RECTAL: deferred, not clinically indicated  NEUROLOGIC: There are no focalizing motor or sensory deficits. CN II-XII are grossly intact. Joby Ortega EXTREMITIES: no cyanosis, no clubbing and no edema. Electronically signed by Ortiz Schaefer MD on 7/27/2020 at 11:25 AM    The patients records and films were reviewed independently. Time was given for questions .  All questions were answered to the patients satisfaction. A total time of 45 minutes  was spent with at least 51% related to counseling and coordination of care.

## 2020-07-27 NOTE — TELEPHONE ENCOUNTER
Scheduled [facility] for an us needle loc to be done by the radiologist on Wed 8/12 at 9:30 & she will arrive to [address] [place] [address] at [address]. Right lumpectomy will be at [address] will be npo after midnight. Consent was signed, antibacterial soap was given & also order for covid was given.

## 2020-07-27 NOTE — PROGRESS NOTES
Subjective:      Patient ID: Kamar Fan is a 69 y.o. female. Chief Complaint   Patient presents with    Surgical Consult     established pt--ref by [facility] for right breast invasive ductal adenocarcinoma       HPI    Review of Systems   Constitutional: Negative for activity change, appetite change, chills, diaphoresis, fatigue, fever and unexpected weight change. HENT: Negative for congestion, dental problem, drooling, ear discharge, ear pain, facial swelling, hearing loss, mouth sores, nosebleeds, postnasal drip, rhinorrhea, sinus pressure, sneezing, sore throat, tinnitus, trouble swallowing and voice change. Eyes: Negative for photophobia, pain, discharge, redness, itching and visual disturbance. Respiratory: Negative for apnea, cough, choking, chest tightness, shortness of breath, wheezing and stridor. Cardiovascular: Negative for chest pain, palpitations and leg swelling. Gastrointestinal: Negative for abdominal distention, abdominal pain, anal bleeding, blood in stool, constipation, diarrhea, nausea, rectal pain and vomiting. Endocrine: Negative for cold intolerance, heat intolerance, polydipsia, polyphagia and polyuria. Genitourinary: Negative for decreased urine volume, difficulty urinating, dysuria, enuresis, flank pain, frequency, genital sores, hematuria and urgency. Musculoskeletal: Negative for arthralgias, back pain, gait problem, joint swelling, myalgias, neck pain and neck stiffness. Skin: Negative for color change, pallor, rash and wound. Allergic/Immunologic: Negative for environmental allergies, food allergies and immunocompromised state. Neurological: Negative for dizziness, tremors, seizures, syncope, facial asymmetry, speech difficulty, weakness, light-headedness, numbness and headaches. Hematological: Negative for adenopathy. Does not bruise/bleed easily.    Psychiatric/Behavioral: Negative for agitation, behavioral problems, confusion, decreased concentration, dysphoric mood, hallucinations, self-injury, sleep disturbance and suicidal ideas. The patient is not nervous/anxious and is not hyperactive.       /62 (Site: Left Upper Arm, Position: Sitting, Cuff Size: Medium Adult)   Pulse 90   Temp 96.5 °F (35.8 °C) (Temporal)   Resp 18   Ht 5' 5.5\" (1.664 m)   Wt 153 lb (69.4 kg)   SpO2 98%   BMI 25.07 kg/m²     Objective:   Physical Exam    Assessment:            Plan:              Jamila Holder LPN

## 2020-07-28 ENCOUNTER — TELEPHONE (OUTPATIENT)
Dept: CASE MANAGEMENT | Age: 70
End: 2020-07-28

## 2020-07-28 NOTE — TELEPHONE ENCOUNTER
Name: Kamar Fan  : [id number]  MRN: [id number]    Oncology Navigation Follow-Up Note    Contact Type:  Telephone    Notes: Returned patient's call. Requesting to attend clinic, 730 am 2020 slot given. Already saw Dr. Armando Aly. Offered teaching. States \"I have been through this 9 months ago. I don't really have questions right now. \" Instructed her to bring breast cancer information packet and can review after clinic. Verbalizes understanding. Plan to complete pre-op teaching at same time. Will follow though continuum of care.     Electronically signed by Alexa Anderson RN on 2020 at 8:27 AM

## 2020-07-29 LAB — MISC. #1 REFERENCE GROUP TEST: NORMAL

## 2020-07-31 ENCOUNTER — CLINICAL DOCUMENTATION (OUTPATIENT)
Dept: CASE MANAGEMENT | Age: 70
End: 2020-07-31

## 2020-07-31 ENCOUNTER — CLINICAL DOCUMENTATION (OUTPATIENT)
Dept: PHYSICAL THERAPY | Age: 70
End: 2020-07-31

## 2020-07-31 NOTE — PROGRESS NOTES
Name: Tim Chavez  :   MRN: [id number]    Oncology Navigation- Initial Note:    Intake-  Contact Type: Integrated Breast Clinic    Diagnosis: Breast-malignant    Home Disposition: Lives alone, . Sister Chari Teran plans to assist if needed. Patient needs and barriers to care: Coordination of Care, Knowledge deficit, Emotional Issues/ Fear/ Anxiety and Financial Concerns/ Disability      Referral Source: Outside Provider    Receptive to Advanced Care Planning/ Palliative Care:  Deferred, clinical stage 1A    Interventions-   General Interventions: Arrived with sister to Breast Clinic to meet with Breast Team. See Media to view Breast Clinic Recommendation form. States \"Went through this 9 months ago but was atypical cells. Saw Dr. Leigh Jenkins but decided not to start antiestrogen at the time. \"     Education/Screenings:  yes - Clinic recommendations, pre-op: sentinel node, lymphedema precautions, exercises after breast surgery, oncology rehab, post-op pillow. [address] already gave breast cancer information packet, navigation packet. Currently on HRT: no     Referrals: Dr. Leigh Jenkins: Daniella Cutler given information to schedule 2 week post-op appt to discuss OncoType Dx and AI. Patient saw doctor previously for atypia . Surgery scheduled for 2020 with Dr. Corinne Livingston. Lon Yeboah with pastoral care and financial navigator referrals. Already referred to genetics, jordan with Danna Coffman 2020. Biopsy site status: Denies any issues, healing. Continuum of Care: Diagnosis/Active Treatment    Notes:  Teaching completed and verbalizes understanding. All questions addressed. Arm measurements completed by Sherri Colorado PTA. Will follow through continuum of care.     Electronically signed by Andrea Mccord RN on 2020 at 8:46 AM

## 2020-07-31 NOTE — PROGRESS NOTES
[facility]  OUTPATIENT REHABILITATION  PHYSICAL THERAPY  INTEGRATED BREAST CANCER CLINIC SCREEN      Date: 2020  Patient Name: Bev Wick        CSN: [id number]   : 1950  (69 y.o.)  Gender: female     Location Right   Type IDC   Hormone Type ER+  HI+   HER2-fernie  Non-Amplified       Strength/ROM Right Left   Shoulder Flexion PROM 170 172   Shoulder Abduction PROM 180 172   Shoulder Strength 4+ 4+   Elbow Strength 4+ 4+   Hand Dominance R        Location Right (cm) Left (cm)   Met Heads 19 18.8   Ulnar Styloid Process 15.8 15.8   10 cm distal to Lat. Epicondyle 26 25   Antecubital Fossa 27 26.6   10 cm proximal to Lat.  Epicondyle 34 33   Axilla 37 36   Total 158.8 155.2     Abe Granado PTA [id number]

## 2020-08-03 ENCOUNTER — HOSPITAL ENCOUNTER (OUTPATIENT)
Dept: PHYSICAL THERAPY | Age: 70
Setting detail: THERAPIES SERIES
Discharge: HOME | End: 2020-08-03
Payer: MEDICARE

## 2020-08-03 PROCEDURE — 97110 THERAPEUTIC EXERCISES: CPT

## 2020-08-03 PROCEDURE — 97161 PT EVAL LOW COMPLEX 20 MIN: CPT

## 2020-08-03 NOTE — PROGRESS NOTES
** PLEASE SIGN, DATE AND TIME CERTIFICATION BELOW AND RETURN TO [facility] OUTPATIENT REHABILITATION (FAX #: [phone]). ATTEST/CO-SIGN IF ACCESSING VIA [employer]. THANK YOU.**    I certify that I have examined the patient below and determined that Physical Medicine and Rehabilitation service is necessary and that I approve the established plan of care for up to 90 days or as specifically noted. Attestation, signature or co-signature of physician indicates approval of certification requirements.    ________________________ ____________ __________  Physician Signature   Date   Time  [facility]  PHYSICAL THERAPY  [x] EVALUATION    [x]  [facility]     Date: 8/3/2020  Patient Name:  Jeremy Clemons  :   MRN: [id number]      Referring Practitioner Nickie Lopez MD   Diagnosis Malignant neoplasm of upper-outer quadrant of right female breast [C50.411]  Estrogen receptor positive status (ER+) [Z17.0]    Treatment Diagnosis Posture abnormality, Z71.9   Date of Evaluation 8/3/20    Additional Pertinent History 20 R IDC ER/NV+, HER2-; 20 R lumpectomy; hx of osteopenia, osteoporosis, hypothyroidism      Functional Outcome Measure Used O: QuickDASH   Functional Outcome Score    0% (8/3/20)       Insurance: Primary: Payor: MEDICARE /  /  / ,   Secondary: BCBS   Authorization Information: Aquatics and modalities approved, no ionto, hot or cold packs   Visit # 1, 1/10 for progress note   Visits Allowed: unlimited   Recertification Date:    Physician Follow-Up: R lumpectomy 20, Mrozek 20       SUBJECTIVE: Pt presents after breast biopsy and awaiting lumpectomy on 20. Denies any issues following biopsy. PAIN: 0/10. Social/Functional History and Current Status:  Medications and Allergies have been reviewed and are listed on Medical History Questionnaire.     Jeremy Clemons lives alone in a single story home with stairs and a handrail to enter. Task Previous Current   ADLs  Independent Independent   Ambulation Independent Independent   Transfers Independent Independent   Recreation Independent Independent, enjoys being outdoors, [place]   [employer]   Work Retired  Retired     OBJECTIVE:   Posture: Fair, Protruded Head, Protracted Scapula  Palpation: No tenderness reported  Observation: No obvious deformity    Range of Motion/Strength (Range of Motion in degrees)    Right Left Comments   Shoulder Flexion PROM 180 180    Shoulder ABDuction PROM 180 180    Shoulder Strength 4+/5 4+/5    Elbow Strength 5/5 5/5      Circumferential Measurements:   Upper Extremity Circumferential Measurements    Right (cm) Left (cm) Comments   Met Heads 19.2 18.5    Ulnar Styloid Process 16 16.2    10 cm distal to Lateral Epicondyle 26 24.7    Elbow Crease 26.5 27    10 cm proximal from Lateral Epicondyle 34 33    Axilla 34.2 35    Total 155.9 154.4       7/31/20  158.8 155.2    Brief Fatigue Inventory: 0.2 (0: none, 1-3: mild, 4-6: moderate, 7-10: severe)  Quick Dash: 0% impaired    Treatment Initiated: Educated to exercises to begin 1 week after surgery pending surgeon's release: scapular retractions, wall walks, and pendulums for left/right, clockwise and counterclockwise. Provided with handout and instructed to focus on 1st 2 exercises if doing well until therapy follow up visit but if having more soreness and/or stiffness, include pendulums to help allow movement without pain or aggravation and prevent glenohumeral capsular restriction. Briefly discussed why circumferential measurements were taken this date as she will be having a sentinel lymph node biopsy which will place her at low risk for lymphedema. Educated pt to the lymphatic system and informed that lymphedema risk will be discussed more fully at her follow up visit.     Activity Tolerance: Patient tolerated treatment well    Specific Interventions for Next Treatment: PORi protocol for gentle manual lymphatic drainage and myofascial release, gentle stretches, strengthening as needed      ASSESSMENT:  Assessment: Pt presents prior to R breast lumpectomy with poor posture and at risk of decreased shoulder mobility and function following surgery and lymphedema if SLNB is performed. She would benefit from skilled PT/Oncology Rehab to address these issues and reduce risk of secondary complications for full return to baseline functioning following surgery. Body Structures/Functions/Activity Limitations::Lymphedema Risk and Impaired posture  Prognosis: good    GOALS:  Patient Goal: Get back to normal    Short Term Goals to be met in 12 weeks:  1. See LTGs    Long Term Goals to be met in 12 weeks:   1. Pt to demo QuickDASH score returned to 0% impaired following surgery for full return to prior level of functioning. 2. Pt to demo R shoulder PROM flexion and abduction returned to 180 deg following surgery for ease with ADLs and meal prep. 3. Pt to demo R shoulder strength returned to 4+/5 for meal prep following surgery. 4. Pt to demo improved posture with neutral head and neck alignment for ease with reaching tasks. 5. Pt to demo controlled R UE girth measures of <160 cm with independence with lymphedema risk reduction strategies if pt has sentinel lymph node biopsy to reduce risk of secondary complications. Patient Education: Plan of care, goals. See \"Treatment Initiated\" for further details. Education Outcome: Verbalized understanding  Education Barriers: None      PLAN:  Treatment Recommendations: Strengthening, Range of Motion, Lymphedema Management, Manual Techniques, Pain Management, Postural Re-Training, Home Exercise Prescription and Safety Education    Plan of care initiated.   Plan to see patient up to 2 times per week for 12 weeks to address the treatment planned outlined above with next

## 2020-08-05 ENCOUNTER — HOSPITAL ENCOUNTER (OUTPATIENT)
Age: 70
Discharge: HOME | End: 2020-08-05
Payer: MEDICARE

## 2020-08-05 PROCEDURE — U0003 INFECTIOUS AGENT DETECTION BY NUCLEIC ACID (DNA OR RNA); SEVERE ACUTE RESPIRATORY SYNDROME CORONAVIRUS 2 (SARS-COV-2) (CORONAVIRUS DISEASE [COVID-19]), AMPLIFIED PROBE TECHNIQUE, MAKING USE OF HIGH THROUGHPUT TECHNOLOGIES AS DESCRIBED BY CMS-2020-01-R: HCPCS

## 2020-08-07 LAB — SARS-COV-2: NOT DETECTED

## 2020-08-11 NOTE — PROGRESS NOTES
Patient contacted regarding COVID-19 screen. Following questions asked: In the last month, have you been in contact with someone who was confirmed or suspected to have Coronavirus/COVID-19:  Patient stated NO    Pt was informed only 1  visitor allowed. Please bring masks. Inform pt will need to have urine test done if she hasn't had a tubal ligation or hysterectomy. Do you or family members have any of the following symptoms:  Cough-no   Muscle pain-no   Shortness of breath-no   Fever-no   Weakness-no  Severe headache-no   Sore throat-no   Respiratory symptoms-no    Have you traveled internationally in the last month?  No     Have you been to the emergency room recently-No

## 2020-08-12 ENCOUNTER — HOSPITAL ENCOUNTER (OUTPATIENT)
Dept: WOMENS IMAGING | Age: 70
Discharge: HOME | End: 2020-08-12
Payer: MEDICARE

## 2020-08-12 ENCOUNTER — ANESTHESIA EVENT (OUTPATIENT)
Dept: OPERATING ROOM | Age: 70
End: 2020-08-12
Payer: MEDICARE

## 2020-08-12 ENCOUNTER — HOSPITAL ENCOUNTER (OUTPATIENT)
Age: 70
Setting detail: OUTPATIENT SURGERY
Discharge: HOME | End: 2020-08-12
Attending: SURGERY | Admitting: SURGERY
Payer: MEDICARE

## 2020-08-12 ENCOUNTER — ANESTHESIA (OUTPATIENT)
Dept: OPERATING ROOM | Age: 70
End: 2020-08-12
Payer: MEDICARE

## 2020-08-12 VITALS
OXYGEN SATURATION: 98 % | WEIGHT: 153.2 LBS | DIASTOLIC BLOOD PRESSURE: 57 MMHG | RESPIRATION RATE: 16 BRPM | TEMPERATURE: 96.6 F | HEIGHT: 64 IN | HEART RATE: 58 BPM | BODY MASS INDEX: 26.15 KG/M2 | SYSTOLIC BLOOD PRESSURE: 120 MMHG

## 2020-08-12 VITALS
OXYGEN SATURATION: 99 % | DIASTOLIC BLOOD PRESSURE: 50 MMHG | RESPIRATION RATE: 13 BRPM | SYSTOLIC BLOOD PRESSURE: 85 MMHG

## 2020-08-12 PROBLEM — Z98.890 S/P LUMPECTOMY, RIGHT BREAST: Status: ACTIVE | Noted: 2020-08-12

## 2020-08-12 PROCEDURE — 19301 PARTIAL MASTECTOMY: CPT | Performed by: SURGERY

## 2020-08-12 PROCEDURE — 76098 X-RAY EXAM SURGICAL SPECIMEN: CPT

## 2020-08-12 PROCEDURE — 6360000002 HC RX W HCPCS: Performed by: REGISTERED NURSE

## 2020-08-12 PROCEDURE — 88342 IMHCHEM/IMCYTCHM 1ST ANTB: CPT

## 2020-08-12 PROCEDURE — 3700000001 HC ADD 15 MINUTES (ANESTHESIA): Performed by: SURGERY

## 2020-08-12 PROCEDURE — 3600000013 HC SURGERY LEVEL 3 ADDTL 15MIN: Performed by: SURGERY

## 2020-08-12 PROCEDURE — 2500000003 HC RX 250 WO HCPCS: Performed by: REGISTERED NURSE

## 2020-08-12 PROCEDURE — 88307 TISSUE EXAM BY PATHOLOGIST: CPT

## 2020-08-12 PROCEDURE — 3600000003 HC SURGERY LEVEL 3 BASE: Performed by: SURGERY

## 2020-08-12 PROCEDURE — 2500000003 HC RX 250 WO HCPCS: Performed by: SURGERY

## 2020-08-12 PROCEDURE — 6370000000 HC RX 637 (ALT 250 FOR IP): Performed by: SURGERY

## 2020-08-12 PROCEDURE — 88341 IMHCHEM/IMCYTCHM EA ADD ANTB: CPT

## 2020-08-12 PROCEDURE — 2709999900 HC NON-CHARGEABLE SUPPLY: Performed by: SURGERY

## 2020-08-12 PROCEDURE — 19285 PERQ DEV BREAST 1ST US IMAG: CPT

## 2020-08-12 PROCEDURE — 3700000000 HC ANESTHESIA ATTENDED CARE: Performed by: SURGERY

## 2020-08-12 PROCEDURE — 7100000010 HC PHASE II RECOVERY - FIRST 15 MIN: Performed by: SURGERY

## 2020-08-12 PROCEDURE — 77065 DX MAMMO INCL CAD UNI: CPT

## 2020-08-12 PROCEDURE — 7100000011 HC PHASE II RECOVERY - ADDTL 15 MIN: Performed by: SURGERY

## 2020-08-12 PROCEDURE — 6360000002 HC RX W HCPCS: Performed by: SURGERY

## 2020-08-12 PROCEDURE — 2580000003 HC RX 258: Performed by: SURGERY

## 2020-08-12 RX ORDER — HYDROCODONE BITARTRATE AND ACETAMINOPHEN 5; 325 MG/1; MG/1
2 TABLET ORAL EVERY 4 HOURS PRN
Status: DISCONTINUED | OUTPATIENT
Start: 2020-08-12 | End: 2020-08-12 | Stop reason: HOSPADM

## 2020-08-12 RX ORDER — HYDROCODONE BITARTRATE AND ACETAMINOPHEN 5; 325 MG/1; MG/1
1 TABLET ORAL EVERY 4 HOURS PRN
Status: DISCONTINUED | OUTPATIENT
Start: 2020-08-12 | End: 2020-08-12 | Stop reason: HOSPADM

## 2020-08-12 RX ORDER — SODIUM CHLORIDE 0.9 % (FLUSH) 0.9 %
10 SYRINGE (ML) INJECTION EVERY 12 HOURS SCHEDULED
Status: DISCONTINUED | OUTPATIENT
Start: 2020-08-12 | End: 2020-08-12 | Stop reason: HOSPADM

## 2020-08-12 RX ORDER — LIDOCAINE HYDROCHLORIDE 10 MG/ML
INJECTION, SOLUTION INFILTRATION; PERINEURAL PRN
Status: DISCONTINUED | OUTPATIENT
Start: 2020-08-12 | End: 2020-08-12 | Stop reason: SDUPTHER

## 2020-08-12 RX ORDER — ONDANSETRON 2 MG/ML
INJECTION INTRAMUSCULAR; INTRAVENOUS PRN
Status: DISCONTINUED | OUTPATIENT
Start: 2020-08-12 | End: 2020-08-12 | Stop reason: SDUPTHER

## 2020-08-12 RX ORDER — HYDROCODONE BITARTRATE AND ACETAMINOPHEN 5; 325 MG/1; MG/1
1 TABLET ORAL EVERY 6 HOURS PRN
Qty: 20 TABLET | Refills: 0 | Status: SHIPPED | OUTPATIENT
Start: 2020-08-12 | End: 2020-08-17

## 2020-08-12 RX ORDER — SODIUM CHLORIDE 9 MG/ML
INJECTION, SOLUTION INTRAVENOUS CONTINUOUS
Status: DISCONTINUED | OUTPATIENT
Start: 2020-08-12 | End: 2020-08-12 | Stop reason: HOSPADM

## 2020-08-12 RX ORDER — FENTANYL CITRATE 50 UG/ML
INJECTION, SOLUTION INTRAMUSCULAR; INTRAVENOUS PRN
Status: DISCONTINUED | OUTPATIENT
Start: 2020-08-12 | End: 2020-08-12 | Stop reason: SDUPTHER

## 2020-08-12 RX ORDER — ACETAMINOPHEN 500 MG
1000 TABLET ORAL ONCE
Status: COMPLETED | OUTPATIENT
Start: 2020-08-12 | End: 2020-08-12

## 2020-08-12 RX ORDER — SODIUM CHLORIDE 0.9 % (FLUSH) 0.9 %
10 SYRINGE (ML) INJECTION PRN
Status: DISCONTINUED | OUTPATIENT
Start: 2020-08-12 | End: 2020-08-12 | Stop reason: HOSPADM

## 2020-08-12 RX ORDER — MIDAZOLAM HYDROCHLORIDE 1 MG/ML
INJECTION INTRAMUSCULAR; INTRAVENOUS PRN
Status: DISCONTINUED | OUTPATIENT
Start: 2020-08-12 | End: 2020-08-12 | Stop reason: SDUPTHER

## 2020-08-12 RX ORDER — PROPOFOL 10 MG/ML
INJECTION, EMULSION INTRAVENOUS CONTINUOUS PRN
Status: DISCONTINUED | OUTPATIENT
Start: 2020-08-12 | End: 2020-08-12 | Stop reason: SDUPTHER

## 2020-08-12 RX ADMIN — FENTANYL CITRATE 25 MCG: 50 INJECTION, SOLUTION INTRAMUSCULAR; INTRAVENOUS at 12:19

## 2020-08-12 RX ADMIN — ONDANSETRON HYDROCHLORIDE 4 MG: 4 INJECTION, SOLUTION INTRAMUSCULAR; INTRAVENOUS at 12:14

## 2020-08-12 RX ADMIN — PROPOFOL 50 MCG/KG/MIN: 10 INJECTION, EMULSION INTRAVENOUS at 12:06

## 2020-08-12 RX ADMIN — CEFAZOLIN 2 G: 10 INJECTION, POWDER, FOR SOLUTION INTRAVENOUS at 12:07

## 2020-08-12 RX ADMIN — MIDAZOLAM HYDROCHLORIDE 2 MG: 1 INJECTION, SOLUTION INTRAMUSCULAR; INTRAVENOUS at 12:02

## 2020-08-12 RX ADMIN — SODIUM CHLORIDE: 9 INJECTION, SOLUTION INTRAVENOUS at 11:37

## 2020-08-12 RX ADMIN — FENTANYL CITRATE 50 MCG: 50 INJECTION, SOLUTION INTRAMUSCULAR; INTRAVENOUS at 12:07

## 2020-08-12 RX ADMIN — ACETAMINOPHEN 1000 MG: 500 TABLET ORAL at 11:41

## 2020-08-12 RX ADMIN — LIDOCAINE HYDROCHLORIDE 80 MG: 10 INJECTION, SOLUTION INFILTRATION; PERINEURAL at 12:07

## 2020-08-12 ASSESSMENT — PAIN SCALES - GENERAL
PAINLEVEL_OUTOF10: 0

## 2020-08-12 ASSESSMENT — PULMONARY FUNCTION TESTS
PIF_VALUE: 0

## 2020-08-12 ASSESSMENT — PAIN - FUNCTIONAL ASSESSMENT: PAIN_FUNCTIONAL_ASSESSMENT: 0-10

## 2020-08-12 NOTE — H&P
Anthony Campos MD  [facility]  General Surgery  H and P for Surgery   Pt Name: Guille Power  Date of Birth 1950   Today's Date: 8/12/2020  Medical Record Number: [id number]  Referring Provider: No ref. provider found  Primary Care Provider: Yajaira Monzon MD  No chief complaint on file. ASSESSMENT      Problem List Items Addressed This Visit     None        Past Medical History:   Diagnosis Date    Acquired hypothyroidism 5/25/2017    Atypical lobular hyperplasia (ALH) of left breast 11/4/2019    Hyperlipidemia     Invasive ductal carcinoma of breast ([facility] [address] 07/2020    right    Osteopenia 5/25/2017    Osteoporosis 12/29/2016          Cancer Staging  Malignant neoplasm of upper-outer quadrant of right breast in female, estrogen receptor positive ([facility] [address]  Staging form: Breast, AJCC 8th Edition  - Clinical stage from 7/27/2020: Stage IA (cT1c, cN0, cM0, G2, ER+, AK+, HER2-) - Signed by Anthony Campos MD on 8/12/2020     PLANS      1. Schedule Kirsten November for  1.  US needle loc RIGHT  2. RIGHT LUMPECTOMY  3. If HER-2 comes back amplified we would then add on a sentinel lymph node biopsy and then she would have to have radiation post procedure. 4. In addition if she is HER-2 positive over 6 mm she would need adjuvant chemotherapy after surgery. 2. In the office, I had a discussion with the patient regarding the treatment options for invasive breast cancer. We discussed lumpectomy and radiation versus mastectomy. We discussed the fact that there is no statistical difference in long term survival or recurrence between the two options. 3. Candidate for Lumpectomy YES/NO: Yes  4. Candidate for omission of sln bx YES/NO: Yes  5. Candidate for potential omission of radiation YES/NO: Yes  6. We had a long discussion in the office regarding treatment options for breast cancer. We discussed lumpectomy and radiation versus simple mastectomy with or without reconstruction.  For lumpectomy, we discussed the conduct of the operation, types of anesthesia available, possible use of needle localization preop and expected postop recovery and cosmetic outcome. We also discussed the risk of recurrence after lumpectomy and radiation estimated to be 9-11% of which 50% of the recurrence is non-invasive breast cancer and the other 50% is invasive cancer. We covered radiation therapy, its typical course and average number of treatments required, as well as possible side effects. In addition, we discussed treatment if recurrence developed which is mastectomy. Finally we discussed the results of NSABP B-24 which showed a 50% reduction in local recurrence with the use of adjuvant tamoxifen taken for 5 years after radiation. We also covered simple mastectomy. The conduct of the operation, use of general anesthesia, the expected postop recovery and cosmetic outcome with and without reconstruction. The recurrence rate of 1% was estimated as the risk of recurrence. After this discussion, the patient's questions were answered and has decided to proceed with surgical intervention. 7. For lumpectomy, we covered the conduct of the operation, the possible use of needle localization preoperatively, the anesthetic options, the typical post op course and cosmetic outcome, and the complications including bleeding, infection, seroma, and reoperation for positive margins. 8. For mastectomy, we covered the conduct of the operation, the use of general anesthesia, the typical post op course and cosmetic outcome. We also covered reconstruction options both immediate and delayed and referral was made if the patient desired reconstruction, or the use of a bra prosthesis. We also covered the possible complications including bleeding, infection, skin slough, seroma formation and others. 9. We also discussed staging and the importance of lymph node sampling. We discussed the use of sentinel lymph node biopsy versus standard axillary lymph node dissection.  We discussed the complications of axillary lymph node dissection and how the information is used in treatment decisions and prognosis. We discussed how the sentinel lymph node is identified and its significance. We discussed the possibility of not finding the sentinel node as well as a 5% false negative rate. 10.  We discussed the ACOSOG  Z011 trial where even if sln is positive, no benefit for further axillary surgery has been shown in lumpectomy in patients getting radiation. 11. We also covered adjuvant chemotherapy and radiation therapy if they would be required. After these discussions, the patient's questions were answered and has elected to proceed with surgery. 12. Her need for genetic testing referral was calculated by questions asked during her mammograms and she was is deemed to be a candidate for testing. For those who are deemed appropriate, referral to the genetic counseling service at [address] [place] [address] was made. Harvey Zaidi the following criteria for further genetic risk evaluation based on NCCN guidelines:   Breast Cancer diagnosed age 48 years or younger.  Triple-negative (ER-, ND-, HER2-) breast cancer diagnosed age 61 years or younger.  Two breast cancer primaries   Breast cancer at any age and   o 3 or more close blood relatives with:  - Breast cancer diagnosis at age 48 years or younger; or  - Invasive ovarian cancer; or  - Male breast cancer; or  - Pancreatic cancer; or  - High-grade (Salem score greater than or equal to 7) or metastatic prostate cancer. o 2 or more close blood relatives with breast cancer at any age.  Lobular breast cancer.  Diffuse gastric cancer   Breast cancer, gastrointestinal cancer, or hamartomatous polyps, ovarian sex chord tumors, pancreatic cancer, testicular sertoli cell tumors, or childhood skin pigmentation. 14.   15.   16.    17. Status: outpatient  18. Planned anesthesia: MAC  19.  She will undergo pre-operative clearance per anesthesia guidelines with risk factors listed under the past medical history diagnosis & problem list.  13.  Perioperative discontinuation of        ASA, Plavix, warfarin, Brillinta, Effient, Pradaxa, Eliquis and Xarelto. Continuation of 81 mg Aspirin is acceptable. 14.  Perioperative medical clearance is not        required         Orders Placed This Encounter:  No orders of the defined types were placed in this encounter. Nidia Ennis is a 69 y.o.  female seen in the office for evaluation of breast cancer. She was referred for evaluation and discussion of treatment options for carcinoma of the right UOQ. she is had core biopsy, right demonstrating an intermediate grade invasive ductal cancer of the right breast. Estrogen receptor status is positive. Progesterone receptor status is positive. HER-2/fernie receptors stain 2+ by IHC. Negative by further testing. Prior to the biopsy she complained of prior left breast iopsy 11/2019 which was [facility] and no breast symptoms and denied galactorrhea, new or changing breast lumps, nipple changes and nipple discharge. Had a BBDRx 11/2019 on [facility] which did not put her at high risk. Risk assessment: mom with breast CA and sister with breast CA. She has not been on previous estrogen therapy. Sister tested negative 10 years ago. No results found.       Past Medical History  Past Medical History:   Diagnosis Date    Acquired hypothyroidism 5/25/2017    Atypical lobular hyperplasia [facility]) of left breast 11/4/2019    Hyperlipidemia     Invasive ductal carcinoma of breast ([facility] [address] 07/2020    right    Osteopenia 5/25/2017    Osteoporosis 12/29/2016       Past Surgical History  Past Surgical History:   Procedure Laterality Date    APPENDECTOMY  1997    BREAST SURGERY Left 11/7/2019    LEFT BREAST EXCISIONAL BIOPSY WITH NEEDLE LOCALIZATION PLACEMENT performed by Osei Mancuso MD at [place]    ruptured ovarian cyst    SACHI [address] BIOPSY RIGHT  7/23/2020    [facility] [address] RIGHT 7/23/2020 Galindo Ramires MD [facility]    TONSILLECTOMY  child      Family History  Family History   Problem Relation Age of Onset    Diabetes Mother     Heart Disease Mother         afib and pacer    High Blood Pressure Mother     Breast Cancer Mother 46    Breast Cancer Sister 62    Dementia Maternal Uncle         alzheimer's    Dementia Maternal Aunt         alzheimer's    Colon Cancer Niece 39    COPD Father     No Known Problems Brother     No Known Problems Brother     Ovarian Cancer Neg Hx      Cancer-related family history includes Breast Cancer (age of onset: 46) in her mother; Breast Cancer (age of onset: 62) in her sister; Colon Cancer (age of onset: 39) in her niece. There is no history of Ovarian Cancer. Medications  Outpatient Encounter Medications as of 7/27/2020   Medication Sig Dispense Refill    atorvastatin (LIPITOR) 20 MG tablet TAKE 1 TABLET EVERY DAY 90 tablet 3    aspirin EC 81 MG EC tablet Take 1 tablet by mouth daily 30 tablet 3    [DISCONTINUED] terbinafine (LAMISIL) 250 MG tablet Take 1 tablet by mouth daily (Patient not taking: Reported on 7/27/2020) 90 tablet 1     No facility-administered encounter medications on file as of 7/27/2020. No current facility-administered medications for this encounter. Current Outpatient Medications   Medication Sig Dispense Refill    atorvastatin (LIPITOR) 20 MG tablet TAKE 1 TABLET EVERY DAY 90 tablet 3    aspirin EC 81 MG EC tablet Take 1 tablet by mouth daily 30 tablet 3     Allergies  No Known Allergies    Social History  Social History     Socioeconomic History    Marital status:       Spouse name: Not on file    Number of children: 0    Years of education: Not on file    Highest education level: Not on file   Occupational History    Occupation: retired   Social Needs    Financial resource strain: Not on [employer] insecurity Worry: Not on file     Inability: Not on file    Transportation needs     Medical: Not on file     Non-medical: Not on file   Tobacco Use    Smoking status: Former Smoker     Packs/day: 0.25     Years: 20.00     Pack years: 5.00     Last attempt to quit: 1986     Years since quittin.6    Smokeless tobacco: Never Used   Substance and Sexual Activity    Alcohol use: No     Alcohol/week: 0.0 standard drinks    Drug use: No    Sexual activity: Not on file   Lifestyle    Physical activity     Days per week: Not on file     Minutes per session: Not on file    Stress: Not on file   Relationships    Social connections     Talks on phone: Not on file     Gets together: Not on file     Attends Baptism service: Not on file     Active member of club or organization: Not on file     Attends meetings of clubs or organizations: Not on file     Relationship status: Not on file    Intimate partner violence     Fear of current or ex partner: Not on file     Emotionally abused: Not on file     Physically abused: Not on file     Forced sexual activity: Not on file   Other Topics Concern    Not on file   Social History Narrative    Not on file         [address] Maintenance   Topic Date Due    Annual Wellness Visit (AWV)  2019    Flu vaccine (1) 2020    Lipid screen  2021    TSH testing  2021    Breast cancer screen  2021    DTaP/Tdap/Td vaccine (2 - Td) 2023    Colon cancer screen colonoscopy  2026    DEXA (modify frequency per FRAX score)  Completed    Shingles Vaccine  Completed    Pneumococcal 65+ years Vaccine  Completed    Hepatitis C screen  Completed    Hepatitis A vaccine  Aged Out    Hepatitis B vaccine  Aged Out    Hib vaccine  Aged Out    Meningococcal (ACWY) vaccine  Aged Out       Review of Systems  Constitutional: Negative for activity change, appetite change, chills, diaphoresis, fatigue, fever and unexpected weight change. HENT: Negative for congestion, dental problem, drooling, ear discharge, ear pain, facial swelling, hearing loss, mouth sores, nosebleeds, postnasal drip, rhinorrhea, sinus pressure, sneezing, sore throat, tinnitus, trouble swallowing and voice change. Eyes: Negative for photophobia, pain, discharge, redness, itching and visual disturbance. Respiratory: Negative for apnea, cough, choking, chest tightness, shortness of breath, wheezing and stridor. Cardiovascular: Negative for chest pain, palpitations and leg swelling. Gastrointestinal: Negative for abdominal distention, abdominal pain, anal bleeding, blood in stool, constipation, diarrhea, nausea, rectal pain and vomiting. Endocrine: Negative for cold intolerance, heat intolerance, polydipsia, polyphagia and polyuria. Genitourinary: Negative for decreased urine volume, difficulty urinating, dysuria, enuresis, flank pain, frequency, genital sores, hematuria and urgency. Musculoskeletal: Negative for arthralgias, back pain, gait problem, joint swelling, myalgias, neck pain and neck stiffness. Skin: Negative for color change, pallor, rash and wound. Allergic/Immunologic: Negative for environmental allergies, food allergies and immunocompromised state. Neurological: Negative for dizziness, tremors, seizures, syncope, facial asymmetry, speech difficulty, weakness, light-headedness, numbness and headaches. Hematological: Negative for adenopathy. Does not bruise/bleed easily. Psychiatric/Behavioral: Negative for agitation, behavioral problems, confusion, decreased concentration, dysphoric mood, hallucinations, self-injury, sleep disturbance and suicidal ideas. The patient is not nervous/anxious and is not hyperactive. OBJECTIVE    VITALS:  vitals were not taken for this visit. CONSTITUTIONAL: Alert and oriented times 3, no acute distress and cooperative to examination with proper mood and affect.   SKIN: Skin color, texture, turgor normal. No rashes or lesions. LYMPH: no cervical nodes, no supraclavicular nodes, no axillary nodes  HEENT: Head is normocephalic, atraumatic. EOMI, PERRLA. NECK: Supple, symmetrical, trachea midline, no adenopathy, thyroid symmetric, not enlarged and no tenderness, skin normal.   BREAST: Right breast examined first there is a biopsy site without hematoma from her needle biopsy. She have a curvilinear periareolar incision from about the 6 to 12 o'clock position from a previous cyst excision in the 80s. There is no palpable masses there is no axillary adenopathy. The left breast is examined she has a previous excisional biopsy scar from her prior surgery November of last year. CHEST/LUNGS: chest symmetric with normal A/P diameter, normal respiratory rate and rhythm, lungs clear to auscultation without wheezes, rales or rhonchi. No accessory muscle use. Scars None   CARDIOVASCULAR: Heart sounds are normal.  Regular rate and rhythm without murmur, gallop or rub. Normal S1 and S2. . Carotid and femoral pulses 2+/4 and equal bilaterally. ABDOMEN: Normal shape. Hysterectomy and appendectomy scar(s) present. Normal bowel sounds. No bruits. Uchetatyana Jordan \"soft, nontender, nondistended, no masses or organomegaly. no evidence of hernia. Percussion: Normal without hepatosplenomegally. Tenderness: absent. RECTAL: deferred, not clinically indicated  NEUROLOGIC: There are no focalizing motor or sensory deficits. CN II-XII are grossly intact. Uchetatyana Jordan EXTREMITIES: no cyanosis, no clubbing and no edema.                Electronically signed by Ortiz Schaefer MD on 8/12/2020 at 7:22 AM

## 2020-08-12 NOTE — ANESTHESIA PRE PROCEDURE
2017    Atypical lobular hyperplasia (ALH) of left breast 2019    Hyperlipidemia     Invasive ductal carcinoma of breast ([address] 2020    right    Osteopenia 2017    Osteoporosis 2016       Past Surgical History:        Procedure Laterality Date    APPENDECTOMY  1997    BREAST SURGERY Left 2019    LEFT BREAST EXCISIONAL BIOPSY WITH NEEDLE LOCALIZATION PLACEMENT performed by Kirit Siddiqui MD at [place]    ruptured ovarian cyst    SACHI [address] BIOPSY RIGHT  2020    SACHI [address] RIGHT 2020 Jeannine Contreras MD [facility]    TONSILLECTOMY  child    US GUIDED NEEDLE LOC OF RIGHT BREAST  2020    US GUIDED NEEDLE LOC OF RIGHT BREAST 2020 [facility]       Social History:    Social History     Tobacco Use    Smoking status: Former Smoker     Packs/day: 0.25     Years: 20.00     Pack years: 5.00     Last attempt to quit: 1986     Years since quittin.6    Smokeless tobacco: Never Used   Substance Use Topics    Alcohol use: No     Alcohol/week: 0.0 standard drinks                                Counseling given: Not Answered      Vital Signs (Current):   Vitals:    20 1113   BP: (!) 165/78   Pulse: 81   Resp: 16   Temp: 97.2 °F (36.2 °C)   TempSrc: Temporal   SpO2: 99%   Weight: 153 lb 3.2 oz (69.5 kg)   Height: 5' 4\" (1.626 m)                                              BP Readings from Last 3 Encounters:   20 (!) 165/78   20 120/62   20 (!) 145/86       NPO Status: Time of last liquid consumption:                         Time of last solid consumption:                         Date of last liquid consumption: 20                        Date of last solid food consumption: 20    BMI:   Wt Readings from Last 3 Encounters:   20 153 lb 3.2 oz (69.5 kg)   20 153 lb (69.4 kg)   20 151 lb 3.2 oz (68.6 kg)     Body mass index is 26.3 kg/m².     CBC: Lab Results   Component Value Date    WBC 6.9 07/01/2020    RBC 4.90 07/01/2020    HGB 15.3 07/01/2020    HCT 50.2 07/01/2020    .4 07/01/2020    RDW 13.3 01/18/2018     07/01/2020       CMP:   Lab Results   Component Value Date     07/01/2020    K 4.5 07/01/2020     07/01/2020    CO2 26 07/01/2020    BUN 13 07/01/2020    CREATININE 0.7 07/01/2020    LABGLOM 82 07/01/2020    GLUCOSE 103 07/01/2020    PROT 7.2 07/01/2020    CALCIUM 10.0 07/01/2020    BILITOT 0.5 07/01/2020    ALKPHOS 69 07/01/2020    AST 19 07/01/2020    ALT 13 07/01/2020       POC Tests: No results for input(s): POCGLU, POCNA, POCK, POCCL, POCBUN, POCHEMO, POCHCT in the last 72 hours. Coags: No results found for: PROTIME, INR, APTT    HCG (If Applicable): No results found for: PREGTESTUR, PREGSERUM, HCG, HCGQUANT     ABGs: No results found for: PHART, PO2ART, CTR1VSK, ZBL8GLI, BEART, K6GJPGKJ     Type & Screen (If Applicable):  No results found for: LABABO, LABRH    Drug/Infectious Status (If Applicable):  Lab Results   Component Value Date    HEPCAB Negative 01/07/2017       COVID-19 Screening (If Applicable):   Lab Results   Component Value Date    COVID19 Not Detected 08/05/2020         Anesthesia Evaluation  Patient summary reviewed and Nursing notes reviewed no history of anesthetic complications:   Airway: Mallampati: II  TM distance: >3 FB   Neck ROM: full  Mouth opening: > = 3 FB Dental:          Pulmonary:Negative Pulmonary ROS and normal exam  breath sounds clear to auscultation                             Cardiovascular:Negative CV ROS  Exercise tolerance: good (>4 METS),                     Neuro/Psych:   Negative Neuro/Psych ROS              GI/Hepatic/Renal: Neg GI/Hepatic/Renal ROS            Endo/Other:    (+) hypothyroidism::., malignancy/cancer (invasive breast cancer). Pt had no PAT visit       Abdominal:           Vascular: negative vascular ROS. Anesthesia Plan      MAC     ASA 3       Induction: intravenous. Anesthetic plan and risks discussed with patient. Plan discussed with CRNA.                   Mari Paulino,    8/12/2020

## 2020-08-12 NOTE — PROGRESS NOTES
Contact Type: [facility]    Contact Information: Arrived with sister for needle localization. Having breast surgery today with Dr. Shruti Monk. Diagnosis: Invasive ductal carcinoma    Patient Expresses Need(s) For: N/A    Referrals:  reminder: REFER TO NURSE KAYLIN IF NOT ALREADY DONE   Teaching Sheets/Booklets Provided: N/A    Notes: Question addressed as needed and procedure explained. Dr. Aiyana De Oliveira placed wire. Secured with gauze and tape per protocol. Transported patient with belongings to same day surgery via wheelchair at 21 .

## 2020-08-12 NOTE — OP NOTE
devices were in place. Previous needle localization had been performed if required. We turned our attention to the needle localization site. The site on the skin was identified and infiltrated with local anesthesia. Once local anesthesia was adequately induced, the lumpectomy was performed by creating an oblique incision over the upper outer quadrant of the breastaround the previously placed localization guidewire. . Following the wire down, the portion of the breast needing excision as identified by needle localization, was excised sharply with Metzenbaum scissors and electrocautery. Every effort was made to stay outside the identified area to obtain clear margins. Once the mass was excised, it was oriented for pathology with a margin map, placed on a grid and sent for x-ray confirmation. By viewing where it seemed to start on the wire compared to our entry site felt it might be close on the lateral margin so a new lateral margin was excised marked with a 2-0 silk and sent as well. The lumpectomy site was then reinspected for hemostasis. Hemostasis was accomplished with electrocautery. Clips were spaced for marking of the cavity for radiation planning. Once hemostasis was satisfactory, the wound was closed with 3-0 Vicryl for deep tissue, and 4-0 Vicryl running, subcuticular stitch for the skin. Dermaflex  applied, and the patient brought back to outpatient in stable condition. At the end of the procedure, sponge and needle counts correct. No apparent complications were noted.                   Specimens:  oriented lumpectomy specimen                   Stage: Cancer Staging  Malignant neoplasm of upper-outer quadrant of right breast in female, estrogen receptor positive ([facility]  Staging form: Breast, AJCC 8th Edition  - Clinical stage from 7/27/2020: Stage IA (cT1c, cN0, cM0, G2, ER+, NJ+, HER2-) - Signed by Cody Duran MD on 8/12/2020            Cody Duran MD FACS  Electronically signed 8/12/2020 at 1:02 PM

## 2020-08-12 NOTE — PROGRESS NOTES
The patient was counseled at length about the risks of robyn Covid-19 during their perioperative period and any recovery window from their procedure. The patient was made aware that robyn Covid-19  may worsen their prognosis for recovering from their procedure  and lend to a higher morbidity and/or mortality risk. All material risks, benefits, and reasonable alternatives including postponing the procedure were discussed. The patient does wish to proceed with the procedure at this time. Formulation and discussion of sedation / procedure plans, risks, benefits, side effects and alternatives with patient and/or responsible adult completed.     Electronically signed by Arrie Cushing, MD on 8/12/2020 at 10:11 AM

## 2020-08-12 NOTE — PROGRESS NOTES

## 2020-08-12 NOTE — PROGRESS NOTES
Pt returned to [facility] room 8. Vitals and assessment as charted. 0.9 infusing. Pt refused snack. Family at the bedside. Pt and family verbalized understanding of discharge criteria and call light use. Call light in reach.

## 2020-08-12 NOTE — ANESTHESIA POSTPROCEDURE EVALUATION
Department of Anesthesiology  Postprocedure Note    Patient: Lance Crook  MRN: [id number]  YOB: 1950  Date of evaluation: 8/12/2020  Time:  1:29 PM     Procedure Summary     Date:  08/12/20 Room / Location:  [address] JOSE Kaminski    Anesthesia Start:  8425 Anesthesia Stop:  8341    Procedure:  RIGHT BREAST LUMPECTOMY WITH PRIOR NEEDLE LOC PLACEMENT and new lateral margin (Right Breast) Diagnosis:  (RIGHT BREAST CANCER)    Surgeon:  Fuad Eugene MD Responsible Provider:  Ary Mcarthur,     Anesthesia Type:  MAC ASA Status:  3          Anesthesia Type: MAC    Bushra Phase I: Bushra Score: 10    Bushra Phase II: Bushra Score: 10    Last vitals: Reviewed and per EMR flowsheets.        Anesthesia Post Evaluation    Patient location during evaluation: bedside  Patient participation: complete - patient participated  Level of consciousness: awake and alert  Pain score: 2  Airway patency: patent  Nausea & Vomiting: no nausea and no vomiting  Complications: no  Cardiovascular status: hemodynamically stable and blood pressure returned to baseline  Respiratory status: spontaneous ventilation, room air and acceptable  Hydration status: stable

## 2020-08-12 NOTE — PROGRESS NOTES
1125:  Patient admitted to [facility]. Sister at bedside. Arm bands applied. Bilat. Socks, SCD's applied. Call light in reach. Patient verbalized approval for first name, last initial and physician name on unit whiteboard. 1126:  Jewelry waiver signed; rings taped.

## 2020-08-12 NOTE — PROGRESS NOTES
CLINICAL PHARMACY NOTE: MEDS TO [address] Select Patient?: Yes  Total # of Prescriptions Filled: 1   The following medications were delivered to the patient:  Hydrocodone-APAP 5/325mg  Total # of Interventions Completed: 2  Time Spent (min): 30    Additional Documentation:

## 2020-08-13 ENCOUNTER — TELEPHONE (OUTPATIENT)
Dept: SURGERY | Age: 70
End: 2020-08-13

## 2020-08-13 NOTE — TELEPHONE ENCOUNTER
Called to check on patient post op, left voicemail asking pt to call the office with any questions or concerns. Office number given.

## 2020-08-14 NOTE — PROGRESS NOTES
[facility] Counseling Program   Hereditary Cancer Risk Assessment     Name: Jason Treviño   YOB: 1950   Date of Consultation: 8/17/20    Ms. Vandana Garcia completed a genetic counseling consultation via telephone on 8/17/20. Ms. Vandana Garcia was referred by Dr. Haydee Prasad due to her personal and family history of cancer. PERSONAL HISTORY   Ms. Vandana Garcia is a 69 y.o.  female who was recently diagnosed with breast cancer at age 69. Specifically, it is an invasive ductal carcinoma of the right breast which is estrogen and progesterone receptor positive. She completed a right lumpectomy on 8/12/20. She is now interested in genetic test results to help guide future screening and cancer risk reduction recommendations. Of note, she was previously diagnosed with a high risk lesion, atypical lobular and ductal hyperplasia (ALH/ ADH) of the left breast in 2019. FAMILY HISTORY  Ms. Vandana Garcia has no biological children. She has one full sister and two full brothers. One sister was diagnosed with breast cancer around age 62. Her sisters daughter (Ms. Michelle Hall niece) was diagnosed with cervical cancer. Her brother's daughter (Ms. Michelle Hall niece) was diagnosed with colon cancer under age 48. Ms. Michelle Hall mother was diagnosed with breast cancer at age 46. Otherwise, there are no known cancers in her extended maternal and paternal families. RISK ASSESSMENT   We discussed that approximately 5-10% of cancers are due to a hereditary gene mutation which causes an increased risk for certain cancers. Hereditary cancers are typically diagnosed at younger ages (under age 46y) and occur in multiple generations of a family. Multiple individuals with the same type of cancer (example: breast) or uncommon cancers (example: ovarian, pancreatic, male breast cancer) are also features of hereditary cancers. We discussed that Ms. Engle's personal history is not highly concerning for a hereditary predisposition given that her breast cancer was diagnosed over age 48. However, she does have several close relatives with breast cancer. In summary, Ms. Ludin Matthews meets the [address] [place] [address] (NCCN) guidelines for genetic testing based on her personal history of breast cancer and having 2 first degree relatives with breast cancer. DISCUSSION  We discussed that the BRCA1/2 genes are the most common genes associated with hereditary breast and ovarian cancer. We also discussed that genetic testing is available for multiple other genes related to hereditary cancer. Some of these genes are known to carry a significant increased risk for several cancers including colon, breast, uterine, ovarian, stomach, and pancreatic cancer, while some of these genes are believed to have a moderate increased risk for breast and other cancers. We discussed the possibility of finding a mutation in genes with limited information to guide medical management, as well we as the possibility of identifying variants of uncertain significance (VUS). We discussed the risks, benefits, and limitations of genetic testing. Possible test results were discussed as well as potential screening and prevention strategies. Specifically, we discussed increased breast cancer surveillance by mammogram and breast MRI as well as the option of prophylactic mastectomy. Lastly, we discussed that the results of Ms. Engle's genetic testing may be beneficial in defining her risk for cancer as well as for her family members. SUMMARY & PLAN  1) Ms. Ludin Matthews meets the NCCN criteria for genetic testing based on her personal history of breast cancer and family history of breast cancer. 2) Genetic testing for the BRCA1/2 genes along with other genes associated with hereditary cancer was offered to Ms. Ludin Matthews. She elected to proceed with the CancerNext Expanded + RNA Insight gene panel. 3) Informed consent was obtained.  She is requesting to fall precautions

## 2020-08-17 ENCOUNTER — VIRTUAL VISIT (OUTPATIENT)
Dept: ONCOLOGY | Age: 70
End: 2020-08-17
Payer: MEDICARE

## 2020-08-17 PROCEDURE — 96040 PR GENETIC COUNSELING, EACH 30 MIN: CPT | Performed by: GENETIC COUNSELOR, MS

## 2020-08-18 NOTE — PROGRESS NOTES
Leonora Stack. Oygikun Gonsalez MD [facility]  General Surgery  Postprocedure Evaluation in Office  Pt Name: Elen Nicholson  Date of Birth 1950   Today's Date: 8/19/2020  Medical Record Number: [id number]  Referring Provider: No ref. provider found  Primary Care Provider: Kourtney Tomlin MD  Chief Complaint   Patient presents with    Post-Op Check     s/p Right  Lumpectomy-8/12/2020     ASSESSMENT      Problem List Items Addressed This Visit     Malignant neoplasm of upper-outer quadrant of right breast in female, estrogen receptor positive ([address] - Primary    Relevant Orders    BIOPSY / EXCISION BREAST    COVID-19 Ambulatory    S/P lumpectomy, right breast    Relevant Orders    BIOPSY / EXCISION BREAST    COVID-19 Ambulatory        Derick Brisker   Past Medical History:   Diagnosis Date    Acquired hypothyroidism 5/25/2017    Atypical lobular hyperplasia [facility]) of left breast 11/4/2019    Hyperlipidemia     Invasive ductal carcinoma of breast ([facility] [address] 07/2020    right    Osteopenia 5/25/2017    Osteoporosis 12/29/2016        PLANS       1. Schedule Derick Brisker for reexcision of cranial margin right breast  2. [facility]  3. Outpatient  4. Local with sedation  The patient was counseled at length about the risks of robyn Covid-19 during their perioperative period and any recovery window from their procedure. The patient was made aware that robyn Covid-19  may worsen their prognosis for recovering from their procedure  and lend to a higher morbidity and/or mortality risk. All material risks, benefits, and reasonable alternatives including postponing the procedure were discussed. The patient does wish to proceed with the procedure at this time. 5.   6.   7. Follow up: No follow-ups on file.   3.    Orders Placed This Encounter:  Orders Placed This Encounter   Procedures    BIOPSY / EXCISION BREAST     Standing Status:   Future     Standing Expiration Date:   8/18/2021     Order Specific Question: Pre-procedure Diagnosis     Answer:   RIGHT BREAST MASS    COVID-19 Ambulatory     Standing Status:   Future     Standing Expiration Date:   8/18/2021     Scheduling Instructions:      Saline media preferred given current shortage of viral transport media but both acceptable     Order Specific Question:   Is this test for diagnosis or screening? Answer:   Screening     Order Specific Question:   Symptomatic for COVID-19 as defined by CDC? Answer:   No     Order Specific Question:   Date of Symptom Onset     Answer:   N/A     Order Specific Question:   Hospitalized for COVID-19? Answer:   No     Order Specific Question:   Admitted to ICU for COVID-19? Answer:   No     Order Specific Question:   Employed in healthcare setting? Answer:   No     Order Specific Question:   Resident in a congregate (group) care setting? Answer:   No     Order Specific Question:   Pregnant? Answer:   No     Order Specific Question:   Previously tested for COVID-19? Answer:   Yes         SUBJECTIVE   Subjective   Monie Cassidy is seen today for  follow-up. She is 1 week(s) status post Right  lumpectomy right with sentinel lymph node biopsy (date - 8/12/2020). Her pathology was infiltrating ductal carcinoma T1c - Tumor more than 1 cm but not more than 2 cm in greatest dimension pNX - Regional lymph nodes cannot be assessed (e.g., previously removed, or not removed for pathologic study). M0 - No clinical or radiographic evidence of distant metastases. Her stage was IA - T1*, N0, M0. The  estrogen receptor positive progesterone receptor positiveHer2/Adriel protein/oncogene not overexpressed. Her cranial margin however was positive for invasive carcinoma and she is going to be scheduled to have a reexcision performed and she is in the office to discuss this.   Past Medical History  Past Medical History:   Diagnosis Date    Acquired hypothyroidism 5/25/2017    Atypical lobular hyperplasia [facility]) of left breast 11/4/2019    Hyperlipidemia     Invasive ductal carcinoma of breast ([facility] [address] 07/2020    right    Osteopenia 5/25/2017    Osteoporosis 12/29/2016     Past Surgical History  Past Surgical History:   Procedure Laterality Date    APPENDECTOMY  1997    BREAST LUMPECTOMY Right 8/12/2020    RIGHT BREAST LUMPECTOMY WITH PRIOR NEEDLE LOC PLACEMENT and new lateral margin performed by Jael Herr MD at [address] [place] [address] [place] Left 11/7/2019    LEFT BREAST EXCISIONAL BIOPSY WITH NEEDLE LOCALIZATION PLACEMENT performed by Jael Herr MD at [place]    ruptured ovarian cyst    [address] [facility] [address] [facility] [address] BIOPSY RIGHT  7/23/2020    SACHI [address] RIGHT 7/23/2020 Zita Cabezas MD [facility]    TONSILLECTOMY  child    US GUIDED NEEDLE LOC OF RIGHT BREAST  8/12/2020    US GUIDED NEEDLE LOC OF RIGHT BREAST 8/12/2020 [facility]     Medications  Current Outpatient Medications on File Prior to Visit   Medication Sig Dispense Refill    NONFORMULARY Take by mouth 2 times daily CALCIUM CALTRATE 600 TWICE A DAY      atorvastatin (LIPITOR) 20 MG tablet TAKE 1 TABLET EVERY DAY 90 tablet 3    aspirin EC 81 MG EC tablet Take 1 tablet by mouth daily 30 tablet 3     No current facility-administered medications on file prior to visit. Allergies  No Known Allergies  Social History  Social History     Socioeconomic History    Marital status:       Spouse name: Not on file    Number of children: 0    Years of education: Not on file    Highest education level: Not on file   Occupational History    Occupation: retired   Social Needs    Financial resource strain: Not on file    Food insecurity     Worry: Not on file     Inability: Not on file   [employer] needs     Medical: Not on file     Non-medical: Not on file   Tobacco Use    Smoking status: Former Smoker     Packs/day: 0.25     Years: 20.00     Pack years: 5.00     Last attempt to quit: 1/1/1986     Years since quittin.6    Smokeless tobacco: Never Used   Substance and Sexual Activity    Alcohol use: No     Alcohol/week: 0.0 standard drinks    Drug use: No    Sexual activity: Not on file   Lifestyle    Physical activity     Days per week: Not on file     Minutes per session: Not on file    Stress: Not on file   Relationships    Social connections     Talks on phone: Not on file     Gets together: Not on file     Attends Bahai service: Not on file     Active member of club or organization: Not on file     Attends meetings of clubs or organizations: Not on file     Relationship status: Not on file    Intimate partner violence     Fear of current or ex partner: Not on file     Emotionally abused: Not on file     Physically abused: Not on file     Forced sexual activity: Not on file   Other Topics Concern    Not on file   Social History Narrative    Not on file     [address] Maintenance   Topic Date Due    Annual Wellness Visit (AWV)  2019    Flu vaccine (1) 2020    Lipid screen  2021    TSH testing  2021    Breast cancer screen  2021    DTaP/Tdap/Td vaccine (2 - Td) 2023    Colon cancer screen colonoscopy  2026    DEXA (modify frequency per FRAX score)  Completed    Shingles Vaccine  Completed    Pneumococcal 65+ years Vaccine  Completed    Hepatitis C screen  Completed    Hepatitis A vaccine  Aged Out    Hepatitis B vaccine  Aged Out    Hib vaccine  Aged Out    Meningococcal (ACWY) vaccine  Aged Out     Review of Systems  History obtained from the patient. Constitutional: Denies any fever, chills, fatigue. Wound: Denies any rash, skin color changes or wound problems. Resp: Denies any cough, shortness of breath. CV: Denies any chest pain, orthopnea or syncope. GI: Denies any nausea, vomiting, blood in the stool, constipation or diarrhea. OBJECTIVE      VITALS:  height is 5' 4\" (1.626 m) and weight is 153 lb (69.4 kg). Her temporal temperature is 96.7 °F (35.9 °C). Her blood pressure is 130/70 and her pulse is 90. Her respiration is 20 and oxygen saturation is 96%. CONSTITUTIONAL: Alert and oriented times 3, no acute distress and cooperative to examination. SKIN: Skin color, texture, turgor normal. No rashes or lesions. NECK: Soft, trachea midline and straight  BREAST right breast has a curvilinear lumpectomy incision closed some bruising is noted no signs of infection no spitting the suture left breast unremarkable  LUNGS: Chest expands equally bilaterally upon respiration, no accessory muscle used. Ausculation reveals no wheezes, rales or rhonchi. CARDIOVASCULAR: Heart sounds are normal.  Regular rate and rhythm without murmur, gallop or rub. Normal S1 and S2. .  ABDOMINAL EXAM: soft, nontender, nondistended, no masses or organomegaly. NEUROLOGIC: No sensory or motor nerve irritation  EXTREMITIES: no cyanosis, no clubbing and no edema. Joey Smallwood.  Lev Meza MD [facility]  8/19/2020  2:42 PM

## 2020-08-19 ENCOUNTER — TELEPHONE (OUTPATIENT)
Dept: SURGERY | Age: 70
End: 2020-08-19

## 2020-08-19 ENCOUNTER — OFFICE VISIT (OUTPATIENT)
Dept: SURGERY | Age: 70
End: 2020-08-19
Payer: MEDICARE

## 2020-08-19 ENCOUNTER — HOSPITAL ENCOUNTER (OUTPATIENT)
Age: 70
Discharge: HOME | End: 2020-08-19
Payer: MEDICARE

## 2020-08-19 VITALS
SYSTOLIC BLOOD PRESSURE: 130 MMHG | BODY MASS INDEX: 26.12 KG/M2 | HEART RATE: 90 BPM | RESPIRATION RATE: 20 BRPM | HEIGHT: 64 IN | TEMPERATURE: 96.7 F | DIASTOLIC BLOOD PRESSURE: 70 MMHG | OXYGEN SATURATION: 96 % | WEIGHT: 153 LBS

## 2020-08-19 PROCEDURE — U0003 INFECTIOUS AGENT DETECTION BY NUCLEIC ACID (DNA OR RNA); SEVERE ACUTE RESPIRATORY SYNDROME CORONAVIRUS 2 (SARS-COV-2) (CORONAVIRUS DISEASE [COVID-19]), AMPLIFIED PROBE TECHNIQUE, MAKING USE OF HIGH THROUGHPUT TECHNOLOGIES AS DESCRIBED BY CMS-2020-01-R: HCPCS

## 2020-08-19 PROCEDURE — 1090F PRES/ABSN URINE INCON ASSESS: CPT | Performed by: SURGERY

## 2020-08-19 PROCEDURE — 1123F ACP DISCUSS/DSCN MKR DOCD: CPT | Performed by: SURGERY

## 2020-08-19 PROCEDURE — 4040F PNEUMOC VAC/ADMIN/RCVD: CPT | Performed by: SURGERY

## 2020-08-19 PROCEDURE — G9899 SCRN MAM PERF RSLTS DOC: HCPCS | Performed by: SURGERY

## 2020-08-19 PROCEDURE — G8399 PT W/DXA RESULTS DOCUMENT: HCPCS | Performed by: SURGERY

## 2020-08-19 PROCEDURE — 1036F TOBACCO NON-USER: CPT | Performed by: SURGERY

## 2020-08-19 PROCEDURE — G8417 CALC BMI ABV UP PARAM F/U: HCPCS | Performed by: SURGERY

## 2020-08-19 PROCEDURE — 3017F COLORECTAL CA SCREEN DOC REV: CPT | Performed by: SURGERY

## 2020-08-19 PROCEDURE — G8427 DOCREV CUR MEDS BY ELIG CLIN: HCPCS | Performed by: SURGERY

## 2020-08-19 PROCEDURE — 99213 OFFICE O/P EST LOW 20 MIN: CPT | Performed by: SURGERY

## 2020-08-19 NOTE — TELEPHONE ENCOUNTER
Scheduled Benny Rudolph for re-excision of right breast cranial margin on Mon 8/24 at 8am at the surgery center. She will arrive at 7am & she will be npo after midnight. Consent was signed & a covid order was given.

## 2020-08-19 NOTE — H&P
Gillian Bhatia. [facility]  General Surgery  H and P for Surgery   Pt Name: Tim Chavez  Date of Birth 1950   Today's Date: 8/19/2020  Medical Record Number: [id number]  Referring Provider: No ref. provider found  Primary Care Provider: Bren Green MD  No chief complaint on file. ASSESSMENT      Problem List Items Addressed This Visit     None        Leo Ward   Past Medical History:   Diagnosis Date    Acquired hypothyroidism 5/25/2017    Atypical lobular hyperplasia [facility]) of left breast 11/4/2019    Hyperlipidemia     Invasive ductal carcinoma of breast ([facility] [address] 07/2020    right    Osteopenia 5/25/2017    Osteoporosis 12/29/2016        PLANS       1. Schedule Leo Ward for reexcision of cranial margin right breast  2. [facility]  3. Outpatient  4. Local with sedation  The patient was counseled at length about the risks of robyn Covid-19 during their perioperative period and any recovery window from their procedure. The patient was made aware that robyn Covid-19  may worsen their prognosis for recovering from their procedure  and lend to a higher morbidity and/or mortality risk. All material risks, benefits, and reasonable alternatives including postponing the procedure were discussed. The patient does wish to proceed with the procedure at this time. 5.   6.   7. Follow up: No follow-ups on file. 3.    Orders Placed This Encounter:  No orders of the defined types were placed in this encounter. SUBJECTIVE   Subjective   Leo Ward is seen today for  follow-up. She is 1 week(s) status post Right  lumpectomy right with sentinel lymph node biopsy (date - 8/12/2020). Her pathology was infiltrating ductal carcinoma T1c - Tumor more than 1 cm but not more than 2 cm in greatest dimension pNX - Regional lymph nodes cannot be assessed (e.g., previously removed, or not removed for pathologic study). M0 - No clinical or radiographic evidence of distant metastases.  Her stage was IA - T1*, N0, M0. The  estrogen receptor positive progesterone receptor positiveHer2/Adriel protein/oncogene not overexpressed. Her cranial margin however was positive for invasive carcinoma and she is going to be scheduled to have a reexcision performed and she is in the office to discuss this. Past Medical History  Past Medical History:   Diagnosis Date    Acquired hypothyroidism 5/25/2017    Atypical lobular hyperplasia [facility]) of left breast 11/4/2019    Hyperlipidemia     Invasive ductal carcinoma of breast ([facility] [address] 07/2020    right    Osteopenia 5/25/2017    Osteoporosis 12/29/2016     Past Surgical History  Past Surgical History:   Procedure Laterality Date    APPENDECTOMY  1997    BREAST LUMPECTOMY Right 8/12/2020    RIGHT BREAST LUMPECTOMY WITH PRIOR NEEDLE LOC PLACEMENT and new lateral margin performed by Kirit Siddiqui MD at [facility] Left 11/7/2019    LEFT BREAST EXCISIONAL BIOPSY WITH NEEDLE LOCALIZATION PLACEMENT performed by Kirit Siddiqui MD at [place]    ruptured ovarian cyst    SACHI [address] BIOPSY RIGHT  7/23/2020    SACHI [address] RIGHT 7/23/2020 Jeannine Contreras MD [facility]    TONSILLECTOMY  child    US GUIDED NEEDLE LOC OF RIGHT BREAST  8/12/2020    US GUIDED NEEDLE LOC OF RIGHT BREAST 8/12/2020 [facility]     Medications  No current facility-administered medications on file prior to encounter. Current Outpatient Medications on File Prior to Encounter   Medication Sig Dispense Refill    NONFORMULARY Take by mouth 2 times daily CALCIUM CALTRATE 600 TWICE A DAY      atorvastatin (LIPITOR) 20 MG tablet TAKE 1 TABLET EVERY DAY 90 tablet 3    aspirin EC 81 MG EC tablet Take 1 tablet by mouth daily 30 tablet 3     Allergies  No Known Allergies  Social History  Social History     Socioeconomic History    Marital status:       Spouse name: Not on file    Number of children: 0    Years of education: Not on file    Highest education level: Not on file   Occupational History    Occupation: retired   Social Needs    Financial resource strain: Not on file    Food insecurity     Worry: Not on file     Inability: Not on file   [employer] needs     Medical: Not on file     Non-medical: Not on file   Tobacco Use    Smoking status: Former Smoker     Packs/day: 0.25     Years: 20.00     Pack years: 5.00     Last attempt to quit: 1986     Years since quittin.6    Smokeless tobacco: Never Used   Substance and Sexual Activity    Alcohol use: No     Alcohol/week: 0.0 standard drinks    Drug use: No    Sexual activity: Not on file   Lifestyle    Physical activity     Days per week: Not on file     Minutes per session: Not on file    Stress: Not on file   Relationships    Social connections     Talks on phone: Not on file     Gets together: Not on file     Attends Hindu service: Not on file     Active member of club or organization: Not on file     Attends meetings of clubs or organizations: Not on file     Relationship status: Not on file    Intimate partner violence     Fear of current or ex partner: Not on file     Emotionally abused: Not on file     Physically abused: Not on file     Forced sexual activity: Not on file   Other Topics Concern    Not on file   Social History Narrative    Not on file     [address] Maintenance   Topic Date Due    Annual Wellness Visit (AWV)  2019    Flu vaccine (1) 2020    Lipid screen  2021    TSH testing  2021    Breast cancer screen  2021    DTaP/Tdap/Td vaccine (2 - Td) 2023    Colon cancer screen colonoscopy  2026    DEXA (modify frequency per FRAX score)  Completed    Shingles Vaccine  Completed    Pneumococcal 65+ years Vaccine  Completed    Hepatitis C screen  Completed    Hepatitis A vaccine  Aged Out    Hepatitis B vaccine  Aged Out    Hib vaccine  Aged Josiah Henriquez

## 2020-08-19 NOTE — LETTER
Order Specific Question:   Is this test for diagnosis or screening? Answer:   Screening     Order Specific Question:   Symptomatic for COVID-19 as defined by CDC? Answer:   No     Order Specific Question:   Date of Symptom Onset     Answer:   N/A     Order Specific Question:   Hospitalized for COVID-19? Answer:   No     Order Specific Question:   Admitted to ICU for COVID-19? Answer:   No     Order Specific Question:   Employed in healthcare setting? Answer:   No     Order Specific Question:   Resident in a congregate (group) care setting? Answer:   No     Order Specific Question:   Pregnant? Answer:   No     Order Specific Question:   Previously tested for COVID-19? Answer: Yes                If I can provide any additional assistance or you have any concerns, please feel free to contact me. Thank you for allowing to participate in the care of your patients. Sincerely,      Flynn Woodard MD [facility]  [address], [address]  Office: [phone]  Fax: [phone]

## 2020-08-20 NOTE — PROGRESS NOTES
NPO after midnight except sip of water with heart/BP meds  Follow all instructions given by surgeon including medications to hold  Bring insurance card and photo ID  Shower the night before or morning of procedure with liquid antibacterial soap  Wear comfortable clothing  Do not bring jewelry or valuables  Bring list of medications with dosage and how often taken if not reviewed   needed at discharge at least 25years old  Call PAT at [phone] for questions    Instructed to call surgery center at [phone] upon arrival to speak with  before entering building. Covid screen due  at [facility] 6 to 7 days before procedure. Pt had completed on 8/19/2020          In preparation for their surgical procedure above patient was screened for Obstructive Sleep Apnea (LAMAR) using the STOP-Bang Questionnaire by the Pre-Admission Testing department. This is a pre-surgical screening tool for patient safety and serves as a recommendation, this WILL NOT cause cancellation of surgery. STOP-Bang Questionnaire  * Do you currently see a pulmonologist?  No         1. Do you snore loudly (able to be heard in the next room)? No    2. Do you often feel tired or sleepy during the daytime? No       3. Has anyone ever told you that you stop breathing during your sleep? No    4. Do you have or are you being treated for high blood pressure? No      5. BMI more than 35? BMI (Calculated): 25.8        No    6. Age over 48 years? 69 y.o. Yes    7. Neck Circumference greater than 17 inches for male or 16 inches for female? Measured           (visits only)            Not Applicable    8. Gender Male?                  No      TOTAL SCORE: 1    LAMAR - Low Risk : Yes to 0 - 2 questions  LAMAR - Intermediate Risk : Yes to 3 - 4 questions  LAMAR - High Risk : Yes to 5 - 8 questions    Adapted from:   STOP Questionnaire: A Tool to Screen Patients for Obstructive Sleep Apnea   [facility] CORINNE [place].P.C., Dewane Baumgarten, M.B.B.S., Sebastian Barr M.D., Arin Yuan. Mu Burroughs, Ph.D., MARLA Caceres.B.B.S., Xin Martinez., Keith Chaudhari M.D., [facility]. GHASSAN PintoC.    Anesthesiology 2008; [phone]:[id number] Copyright 2008, the [address] [place] [address] of Anesthesiologists, [place] [address].   ----------------------------------------------------------------------------------------------------------------

## 2020-08-21 LAB — SARS-COV-2: NOT DETECTED

## 2020-08-24 ENCOUNTER — ANESTHESIA (OUTPATIENT)
Dept: OPERATING ROOM | Age: 70
End: 2020-08-24
Payer: MEDICARE

## 2020-08-24 ENCOUNTER — ANESTHESIA EVENT (OUTPATIENT)
Dept: OPERATING ROOM | Age: 70
End: 2020-08-24
Payer: MEDICARE

## 2020-08-24 ENCOUNTER — HOSPITAL ENCOUNTER (OUTPATIENT)
Age: 70
Setting detail: OUTPATIENT SURGERY
Discharge: HOME | End: 2020-08-24
Attending: SURGERY | Admitting: SURGERY
Payer: MEDICARE

## 2020-08-24 VITALS
HEART RATE: 74 BPM | BODY MASS INDEX: 25.81 KG/M2 | HEIGHT: 64 IN | OXYGEN SATURATION: 97 % | TEMPERATURE: 97.3 F | SYSTOLIC BLOOD PRESSURE: 117 MMHG | WEIGHT: 151.2 LBS | RESPIRATION RATE: 16 BRPM | DIASTOLIC BLOOD PRESSURE: 57 MMHG

## 2020-08-24 VITALS
DIASTOLIC BLOOD PRESSURE: 51 MMHG | SYSTOLIC BLOOD PRESSURE: 99 MMHG | OXYGEN SATURATION: 98 % | RESPIRATION RATE: 15 BRPM

## 2020-08-24 PROCEDURE — 2709999900 HC NON-CHARGEABLE SUPPLY: Performed by: SURGERY

## 2020-08-24 PROCEDURE — 7100000010 HC PHASE II RECOVERY - FIRST 15 MIN: Performed by: SURGERY

## 2020-08-24 PROCEDURE — 3600000013 HC SURGERY LEVEL 3 ADDTL 15MIN: Performed by: SURGERY

## 2020-08-24 PROCEDURE — 3600000003 HC SURGERY LEVEL 3 BASE: Performed by: SURGERY

## 2020-08-24 PROCEDURE — 3700000000 HC ANESTHESIA ATTENDED CARE: Performed by: SURGERY

## 2020-08-24 PROCEDURE — 2580000003 HC RX 258: Performed by: SURGERY

## 2020-08-24 PROCEDURE — 6360000002 HC RX W HCPCS: Performed by: NURSE ANESTHETIST, CERTIFIED REGISTERED

## 2020-08-24 PROCEDURE — 7100000011 HC PHASE II RECOVERY - ADDTL 15 MIN: Performed by: SURGERY

## 2020-08-24 PROCEDURE — 3700000001 HC ADD 15 MINUTES (ANESTHESIA): Performed by: SURGERY

## 2020-08-24 PROCEDURE — 19301 PARTIAL MASTECTOMY: CPT | Performed by: SURGERY

## 2020-08-24 PROCEDURE — 2500000003 HC RX 250 WO HCPCS: Performed by: SURGERY

## 2020-08-24 PROCEDURE — 88307 TISSUE EXAM BY PATHOLOGIST: CPT

## 2020-08-24 RX ORDER — MIDAZOLAM HYDROCHLORIDE 1 MG/ML
INJECTION INTRAMUSCULAR; INTRAVENOUS PRN
Status: DISCONTINUED | OUTPATIENT
Start: 2020-08-24 | End: 2020-08-24 | Stop reason: SDUPTHER

## 2020-08-24 RX ORDER — LIDOCAINE HYDROCHLORIDE 10 MG/ML
INJECTION, SOLUTION EPIDURAL; INFILTRATION; INTRACAUDAL; PERINEURAL PRN
Status: DISCONTINUED | OUTPATIENT
Start: 2020-08-24 | End: 2020-08-24 | Stop reason: ALTCHOICE

## 2020-08-24 RX ORDER — IBUPROFEN 800 MG/1
400 TABLET ORAL ONCE
Status: DISCONTINUED | OUTPATIENT
Start: 2020-08-24 | End: 2020-08-24 | Stop reason: HOSPADM

## 2020-08-24 RX ORDER — PROPOFOL 10 MG/ML
INJECTION, EMULSION INTRAVENOUS CONTINUOUS PRN
Status: DISCONTINUED | OUTPATIENT
Start: 2020-08-24 | End: 2020-08-24 | Stop reason: SDUPTHER

## 2020-08-24 RX ORDER — SODIUM CHLORIDE 9 MG/ML
INJECTION, SOLUTION INTRAVENOUS CONTINUOUS
Status: DISCONTINUED | OUTPATIENT
Start: 2020-08-24 | End: 2020-08-24 | Stop reason: HOSPADM

## 2020-08-24 RX ORDER — CEFAZOLIN SODIUM 1 G/3ML
INJECTION, POWDER, FOR SOLUTION INTRAMUSCULAR; INTRAVENOUS PRN
Status: DISCONTINUED | OUTPATIENT
Start: 2020-08-24 | End: 2020-08-24 | Stop reason: SDUPTHER

## 2020-08-24 RX ORDER — SODIUM CHLORIDE 0.9 % (FLUSH) 0.9 %
10 SYRINGE (ML) INJECTION PRN
Status: DISCONTINUED | OUTPATIENT
Start: 2020-08-24 | End: 2020-08-24 | Stop reason: HOSPADM

## 2020-08-24 RX ORDER — ACETAMINOPHEN 500 MG
1000 TABLET ORAL ONCE
Status: DISCONTINUED | OUTPATIENT
Start: 2020-08-24 | End: 2020-08-24 | Stop reason: HOSPADM

## 2020-08-24 RX ORDER — SODIUM CHLORIDE 0.9 % (FLUSH) 0.9 %
10 SYRINGE (ML) INJECTION EVERY 12 HOURS SCHEDULED
Status: DISCONTINUED | OUTPATIENT
Start: 2020-08-24 | End: 2020-08-24 | Stop reason: HOSPADM

## 2020-08-24 RX ORDER — FENTANYL CITRATE 50 UG/ML
INJECTION, SOLUTION INTRAMUSCULAR; INTRAVENOUS PRN
Status: DISCONTINUED | OUTPATIENT
Start: 2020-08-24 | End: 2020-08-24 | Stop reason: SDUPTHER

## 2020-08-24 RX ORDER — BUPIVACAINE HYDROCHLORIDE 5 MG/ML
INJECTION, SOLUTION PERINEURAL PRN
Status: DISCONTINUED | OUTPATIENT
Start: 2020-08-24 | End: 2020-08-24 | Stop reason: ALTCHOICE

## 2020-08-24 RX ADMIN — MIDAZOLAM HYDROCHLORIDE 2 MG: 1 INJECTION, SOLUTION INTRAMUSCULAR; INTRAVENOUS at 07:50

## 2020-08-24 RX ADMIN — SODIUM CHLORIDE: 9 INJECTION, SOLUTION INTRAVENOUS at 07:49

## 2020-08-24 RX ADMIN — FENTANYL CITRATE 100 MCG: 50 INJECTION, SOLUTION INTRAMUSCULAR; INTRAVENOUS at 07:50

## 2020-08-24 RX ADMIN — CEFAZOLIN 2000 MG: 1 INJECTION, POWDER, FOR SOLUTION INTRAMUSCULAR; INTRAVENOUS; PARENTERAL at 07:54

## 2020-08-24 RX ADMIN — PROPOFOL 100 MCG/KG/MIN: 10 INJECTION, EMULSION INTRAVENOUS at 07:52

## 2020-08-24 ASSESSMENT — PULMONARY FUNCTION TESTS
PIF_VALUE: 1

## 2020-08-24 ASSESSMENT — PAIN - FUNCTIONAL ASSESSMENT: PAIN_FUNCTIONAL_ASSESSMENT: 0-10

## 2020-08-24 ASSESSMENT — PAIN SCALES - GENERAL: PAINLEVEL_OUTOF10: 0

## 2020-08-24 NOTE — OP NOTE
[address] [place] [address]  Operative Report    PATIENT NAME: Colleen Finn RECORD NO. [id number]  SURGEON: Asif Rowe MD, FACS  Primary Care Physician: Lauren Crisostomo MD  Date: 8/24/2020, 8:11 AM     PROCEDURE PERFORMED: Right   Re excision cranial margin  PREOPERATIVE DIAGNOSIS: Right  Breast cancer  POSTOPERATIVE DIAGNOSIS: Same, path pending  SURGEON:  Asif Rowe MD, FACS  ANESTHESIA:  Monitored Local Anesthesia with Sedation  ESTIMATED BLOOD LOSS:  0  ml  SPECIMEN:  Breast and axillary contents  COMPLICATIONS:  None; patient tolerated the procedure well. DRAINS: none  DISPOSITION: PACU - hemodynamically stable. CONDITION: stable        Procedure Details   The patient was seen in the Holding Area. The risks, benefits, complications, treatment options, and expected outcomes had been previously discussed with the patient. The possibilities of reaction to medication, pulmonary aspiration, bleeding, infection, the need for additional procedures, failure to diagnose a condition, and creating a complication requiring transfusion or operation were discussed with the patient. The patient concurred with the proposed plan, giving informed consent. The site of surgery properly noted/marked. The patient was brought to the operating room, placed supine on the operating room table, time, was taken, preoperative antibiotics were given, and signed consent on the chart. Sequential compression devices were in place. We turned our attention to the previous lumpectomy site. The site on the skin was identified and infiltrated with local anesthesia. Once local anesthesia was adequately induced, the lumpectomy incision was opened, seroma aspirated(old blood) and cranial margin identified and 1 cm width excised Once the mass was excised,  it was oriented for pathology with a suture. The lumpectomy site was then reinspected for hemostasis. Hemostasis was accomplished with electrocautery.  . Once hemostasis was

## 2020-08-24 NOTE — ANESTHESIA PRE PROCEDURE
Department of Anesthesiology  Preprocedure Note       Name:  Tamiko Kang   Age:  69 y.o.  :  1950                                          MRN:  [id number]         Date:  2020      Surgeon: Jax Rashid):  Graciela Myers MD    Procedure: Procedure(s):  RE-EXCISION CARNIAL MARGIN RIGHT BREAST    Medications prior to admission:   Prior to Admission medications    Medication Sig Start Date End Date Taking?  Authorizing Provider   NONFORMULARY Take by mouth 2 times daily CALCIUM CALTRATE 600 TWICE A DAY   Yes Historical Provider, MD   atorvastatin (LIPITOR) 20 MG tablet TAKE 1 TABLET EVERY DAY 20  Yes Norbert Resendez MD   aspirin EC 81 MG EC tablet Take 1 tablet by mouth daily 8/21/15  Yes Norbert Resendez MD       Current medications:    Current Facility-Administered Medications   Medication Dose Route Frequency Provider Last Rate Last Dose    0.9 % sodium chloride infusion   Intravenous Continuous Graciela Myers MD        sodium chloride flush 0.9 % injection 10 mL  10 mL Intravenous 2 times per day Graciela Myers MD        sodium chloride flush 0.9 % injection 10 mL  10 mL Intravenous PRN Graciela Myers MD        ceFAZolin (ANCEF) 2 g in dextrose 5 % 50 mL IVPB  2 g Intravenous On Call to MD Jud        acetaminophen (TYLENOL) tablet 1,000 mg  1,000 mg Oral Once Graciela Myers MD        ibuprofen (ADVIL;MOTRIN) tablet 400 mg  400 mg Oral Once Graciela Myers MD           Allergies:  No Known Allergies    Problem List:    Patient Active Problem List   Diagnosis Code    Osteoporosis M81.0    Pure hypercholesterolemia E78.00    Acquired hypothyroidism E03.9    Osteopenia M85.80    Neoplasm of uncertain behavior of left breast D48.62    Atypical lobular hyperplasia (ALH) of left breast N60.92    S/P left breast biopsy Z98.890    Malignant neoplasm of upper-outer quadrant of right breast in female, estrogen receptor positive ([facility] [address] C50.411, Z17.0    S/P consumption: 2000                        Date of last liquid consumption: 08/23/20                        Date of last solid food consumption: 08/23/20    BMI:   Wt Readings from Last 3 Encounters:   08/24/20 151 lb 3.2 oz (68.6 kg)   08/19/20 153 lb (69.4 kg)   08/12/20 153 lb 3.2 oz (69.5 kg)     Body mass index is 25.95 kg/m². CBC:   Lab Results   Component Value Date    WBC 6.9 07/01/2020    RBC 4.90 07/01/2020    HGB 15.3 07/01/2020    HCT 50.2 07/01/2020    .4 07/01/2020    RDW 13.3 01/18/2018     07/01/2020       CMP:   Lab Results   Component Value Date     07/01/2020    K 4.5 07/01/2020     07/01/2020    CO2 26 07/01/2020    BUN 13 07/01/2020    CREATININE 0.7 07/01/2020    LABGLOM 82 07/01/2020    GLUCOSE 103 07/01/2020    PROT 7.2 07/01/2020    CALCIUM 10.0 07/01/2020    BILITOT 0.5 07/01/2020    ALKPHOS 69 07/01/2020    AST 19 07/01/2020    ALT 13 07/01/2020       POC Tests: No results for input(s): POCGLU, POCNA, POCK, POCCL, POCBUN, POCHEMO, POCHCT in the last 72 hours.     Coags: No results found for: PROTIME, INR, APTT    HCG (If Applicable): No results found for: PREGTESTUR, PREGSERUM, HCG, HCGQUANT     ABGs: No results found for: PHART, PO2ART, IGI4HUB, DZM4TYX, BEART, F2EPKGDQ     Type & Screen (If Applicable):  No results found for: LABABO, LABRH    Drug/Infectious Status (If Applicable):  Lab Results   Component Value Date    HEPCAB Negative 01/07/2017       COVID-19 Screening (If Applicable):   Lab Results   Component Value Date    COVID19 Not Detected 08/19/2020         Anesthesia Evaluation    Airway: Mallampati: II  TM distance: >3 FB   Neck ROM: full  Mouth opening: > = 3 FB Dental:          Pulmonary: breath sounds clear to auscultation                             Cardiovascular:            Rhythm: regular                      Neuro/Psych:               GI/Hepatic/Renal:             Endo/Other:    (+) hypothyroidism::., .                 Abdominal: Vascular:                                        Anesthesia Plan      MAC     ASA 2       Induction: intravenous. Anesthetic plan and risks discussed with patient. Plan discussed with CRNA.                   Jody Ochoa MD   8/24/2020

## 2020-08-24 NOTE — PROGRESS NOTES
0643: Patient to phase 2 recovery room via transport chair. Patient is drowsy but arouses easily to name. Report received from surgical RN, Ruth Ruffin. Patient's vitals obtained, see charting. 0815: Patient is denying pain and nausea at this time. IV is infusing into her left hand. Incision site is clean, dry and intact- see LDA. 1755: Offered drink and snack provided to the patient. Call light is within reach and patient's sister is at the bedside. 1043: IV removed at this time by Kylah Weber RN- no complications and dressing applied. 7964: Discharge instructions given and explained to the patient and the patient's sister, both verbalized understanding. 0848: Patient is getting dressed at this time. 9795: Patient ambulated to the car and discharged home in stable condition with her sister.

## 2020-08-24 NOTE — ANESTHESIA POSTPROCEDURE EVALUATION
Department of Anesthesiology  Postprocedure Note    Patient: Trell Morris  MRN: [id number]  YOB: 1950  Date of evaluation: 8/24/2020  Time:  11:26 AM     Procedure Summary     Date:  08/24/20 Room / Location:  [address] [place] [address] 02 / 138 [facility]    Anesthesia Start:  6968 Anesthesia Stop:  8684    Procedure:  RE-EXCISION CARNIAL MARGIN RIGHT BREAST (Right Breast) Diagnosis:  (right breast cancer)    Surgeon:  Reese Johnson MD Responsible Provider:  Sammy Eden MD    Anesthesia Type:  MAC ASA Status:  2          Anesthesia Type: MAC    Bushra Phase I:      Bushra Phase II: Bushra Score: 9    Last vitals: Reviewed and per EMR flowsheets.        Anesthesia Post Evaluation    Patient location during evaluation: PACU  Patient participation: complete - patient participated  Level of consciousness: awake  Airway patency: patent  Nausea & Vomiting: no vomiting and no nausea  Complications: no  Cardiovascular status: hemodynamically stable  Respiratory status: acceptable  Hydration status: stable

## 2020-08-25 ENCOUNTER — TELEPHONE (OUTPATIENT)
Dept: SURGERY | Age: 70
End: 2020-08-25

## 2020-08-26 ENCOUNTER — HOSPITAL ENCOUNTER (OUTPATIENT)
Dept: INFUSION THERAPY | Age: 70
Discharge: HOME | End: 2020-08-26
Payer: MEDICARE

## 2020-08-26 ENCOUNTER — OFFICE VISIT (OUTPATIENT)
Dept: ONCOLOGY | Age: 70
End: 2020-08-26
Payer: MEDICARE

## 2020-08-26 VITALS
HEIGHT: 64 IN | WEIGHT: 153 LBS | DIASTOLIC BLOOD PRESSURE: 72 MMHG | HEART RATE: 101 BPM | OXYGEN SATURATION: 98 % | SYSTOLIC BLOOD PRESSURE: 159 MMHG | RESPIRATION RATE: 18 BRPM | BODY MASS INDEX: 26.12 KG/M2 | TEMPERATURE: 98.3 F

## 2020-08-26 PROCEDURE — G9899 SCRN MAM PERF RSLTS DOC: HCPCS | Performed by: INTERNAL MEDICINE

## 2020-08-26 PROCEDURE — 99211 OFF/OP EST MAY X REQ PHY/QHP: CPT

## 2020-08-26 PROCEDURE — G8417 CALC BMI ABV UP PARAM F/U: HCPCS | Performed by: INTERNAL MEDICINE

## 2020-08-26 PROCEDURE — G8427 DOCREV CUR MEDS BY ELIG CLIN: HCPCS | Performed by: INTERNAL MEDICINE

## 2020-08-26 PROCEDURE — G8399 PT W/DXA RESULTS DOCUMENT: HCPCS | Performed by: INTERNAL MEDICINE

## 2020-08-26 PROCEDURE — 1090F PRES/ABSN URINE INCON ASSESS: CPT | Performed by: INTERNAL MEDICINE

## 2020-08-26 PROCEDURE — 1036F TOBACCO NON-USER: CPT | Performed by: INTERNAL MEDICINE

## 2020-08-26 PROCEDURE — 1123F ACP DISCUSS/DSCN MKR DOCD: CPT | Performed by: INTERNAL MEDICINE

## 2020-08-26 PROCEDURE — 4040F PNEUMOC VAC/ADMIN/RCVD: CPT | Performed by: INTERNAL MEDICINE

## 2020-08-26 PROCEDURE — 3017F COLORECTAL CA SCREEN DOC REV: CPT | Performed by: INTERNAL MEDICINE

## 2020-08-26 PROCEDURE — 99205 OFFICE O/P NEW HI 60 MIN: CPT | Performed by: INTERNAL MEDICINE

## 2020-08-26 NOTE — PATIENT INSTRUCTIONS
1. Referral to radiation oncology for discussion about postlumpectomy radiation treatment  2.   RTC to see me in 10 weeks

## 2020-08-27 ENCOUNTER — APPOINTMENT (OUTPATIENT)
Dept: PHYSICAL THERAPY | Age: 70
End: 2020-08-27
Payer: MEDICARE

## 2020-08-27 ENCOUNTER — TELEPHONE (OUTPATIENT)
Dept: SPIRITUAL SERVICES | Facility: CLINIC | Age: 70
End: 2020-08-27

## 2020-08-27 NOTE — TELEPHONE ENCOUNTER
As a follow up to the referral from the [facility], a phone call for spiritual care was made to Sergo Anglin. She answered the phone and was very pleased with what everyone is doing. She has a good report and was happy to share that with this . Karina Chacon stated she is supported by her [facility] in [facility] and would not be needing spiritual care support from [address] [place] [address] staff. Care Plan: Spiritual and emotional care for patient and family is available as needed.

## 2020-09-08 ENCOUNTER — TELEPHONE (OUTPATIENT)
Dept: ONCOLOGY | Age: 70
End: 2020-09-08

## 2020-09-08 NOTE — TELEPHONE ENCOUNTER
[facility]   Hereditary Cancer Risk Assessment     Name: Shimon Zurita  YOB: 1950  Date of Results Disclosure: 09/08/20      HISTORY   Ms. Liliana Javed was seen for genetic counseling on 8/17/20 at the request of Sara Dumont MD due to her personal and family history of breast cancer. At that time, Ms. Liliana Javed chose to pursue genetic testing via the [employer] Expanded + RNA gene panel. These results were discussed with Ms. Liliana Javed on 09/08/20 via telephone. A summary of Ms. Engle's results and recommendations are below. RESULTS  [employer] Cancer[employer]-Expanded Panel + RNAinsight: NEGATIVE - NO CLINICALLY SIGNIFICANT MUTATIONS DETECTED   This panel included the analysis of 77 genes associated with hereditary cancer including: AIP, ALK, APC, DEONDRE, BAP1, BARD1, BLM, BMPR1A, BRCA1, BRCA2, BRIP1, CDC73, CDH1, CDK4, CDKN1B, CDKN2A, CHEK2, CTNNA1, DICER1, EGFR, EGLN1, EPCAM, FANCC, FH, FLCN, GALNT12, GREM1, HOXB13, KIF1B, KIT1, LZTR1, MAX, MEN1, MET, MITF, MLH1, MSH2, MSH3, MSH6, MUTYH, NBN, NF1, NF2, NTHL1, PALB2, PDGFRA, PHOX2B, PMS2, POLD1, POLE, POT1, PJEOU5C, PTCH1, PTEN, RAD51C, RAD51D, RB1, RECQL, RET, SDHA, SDHAF2, SDHB, SDHC, ,SDHD, SMAD4, SMARCA4, SMARCB1, SMARCE1, STK11, SUFU, KOCM348, TP53, TSC1, TSC2, VHL, and XRCC2. In addition, no clinically relevant aberrant RNA transcripts were detected in select genes. Please refer to genetic test report for technical details. 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 Because the significance of the DEONDRE gene VUS is unknown, medical management must be based on Ms. Engle's personal history and family history of cancer. We discussed that Ms.  Kadie's negative test testing or research options available. 5) We encourage Ms. Nicole Mcgrath to contact us with updates to her personal and/or familys cancer history as this information may alter our assessment and/or recommendations. The [address] [place] [address] Program would be glad to offer our assistance should you have any questions or concerns about this information. Please feel free to contact us at [phone]. Hayley Mahan.  Oksana Floyd [address], [facility]   Licensed Genetic Counselor         CC:  Ms. Kyra Smith MD

## 2020-09-08 NOTE — PROGRESS NOTES
Karen Warner. Matthew Jack MD [facility]  General Surgery  Postprocedure Evaluation in Office  Pt Name: Med Franco  Date of Birth 1950   Today's Date: 9/9/2020  Medical Record Number: [id number]  Referring Provider: No ref. provider found  Primary Care Provider: Nancy Mulligan MD  Chief Complaint   Patient presents with   Ashu Ace Post-Op Check     s/p Right Re-excision cranial margin 8/24/2020-Saw Dr Jonathan Guallpa 8/26/2020     ASSESSMENT      Problem List Items Addressed This Visit     Malignant neoplasm of upper-outer quadrant of right breast in female, estrogen receptor positive ([facility]    S/P lumpectomy, right breast - Primary      Cancer Staging  Malignant neoplasm of upper-outer quadrant of right breast in female, estrogen receptor positive ([facility] [address]  Staging form: Breast, AJCC 8th Edition  - Clinical stage from 7/27/2020: Stage IA (cT1c, cN0, cM0, G2, ER+, VT+, HER2-) - Signed by Vivi Pruitt MD on 8/12/2020  - Pathologic stage from 8/18/2020: No Stage Recommended (pT1c, cN0, cM0, G1, ER+, VT+, HER2-) - Signed by Vivi Pruitt MD on 9/8/2020     Horald Boast   Past Medical History:   Diagnosis Date    Acquired hypothyroidism 5/25/2017    Atypical lobular hyperplasia ([place]) of left breast 11/4/2019    Hyperlipidemia     Invasive ductal carcinoma of breast ([address] 07/2020    right    Osteopenia 5/25/2017    Osteoporosis 12/29/2016        PLANS       1. Saw Dr Jonathan Guallpa who said no chemo  2. Sees Rad Onc Friday, if no rads them start arimidex  3. Follow up: Return if symptoms worsen or fail to improve. 3.    Orders Placed This Encounter:  No orders of the defined types were placed in this encounter. SUBJECTIVE   Subjective   Horald Boast is seen today for  follow-up. She is 2 week(s) status post Right  lumpectomy right with sentinel lymph node biopsy (date - 8/12/2020). And then re excision of positive margin which is cleared.  Her pathology was infiltrating ductal carcinoma T1c - Tumor more than 1 cm but not more than 2 cm visit. Allergies  No Known Allergies  Social History  Social History     Socioeconomic History    Marital status:       Spouse name: Not on file    Number of children: 0    Years of education: Not on file    Highest education level: Not on file   Occupational History    Occupation: retired   Social Needs    Financial resource strain: Not on file    Food insecurity     Worry: Not on file     Inability: Not on file   [employer] needs     Medical: Not on file     Non-medical: Not on file   Tobacco Use    Smoking status: Former Smoker     Packs/day: 0.25     Years: 20.00     Pack years: 5.00     Last attempt to quit: 1986     Years since quittin.7    Smokeless tobacco: Never Used   Substance and Sexual Activity    Alcohol use: No     Alcohol/week: 0.0 standard drinks    Drug use: No    Sexual activity: Not on file   Lifestyle    Physical activity     Days per week: Not on file     Minutes per session: Not on file    Stress: Not on file   Relationships    Social connections     Talks on phone: Not on file     Gets together: Not on file     Attends Zoroastrianism service: Not on file     Active member of club or organization: Not on file     Attends meetings of clubs or organizations: Not on file     Relationship status: Not on file    Intimate partner violence     Fear of current or ex partner: Not on file     Emotionally abused: Not on file     Physically abused: Not on file     Forced sexual activity: Not on file   Other Topics Concern    Not on file   Social History Narrative    Not on file     [address] Maintenance   Topic Date Due    Annual Wellness Visit (AWV)  2019    Flu vaccine (1) 2020    Lipid screen  2021    TSH testing  2021    Breast cancer screen  2021    DTaP/Tdap/Td vaccine (2 - Td) 2023    Colon cancer screen colonoscopy  2026    DEXA (modify frequency per FRAX score)  Completed    Shingles

## 2020-09-09 ENCOUNTER — OFFICE VISIT (OUTPATIENT)
Dept: SURGERY | Age: 70
End: 2020-09-09

## 2020-09-09 VITALS
WEIGHT: 153 LBS | OXYGEN SATURATION: 93 % | SYSTOLIC BLOOD PRESSURE: 120 MMHG | HEART RATE: 53 BPM | BODY MASS INDEX: 26.12 KG/M2 | DIASTOLIC BLOOD PRESSURE: 62 MMHG | TEMPERATURE: 97.8 F | RESPIRATION RATE: 18 BRPM | HEIGHT: 64 IN

## 2020-09-09 PROCEDURE — 99024 POSTOP FOLLOW-UP VISIT: CPT | Performed by: SURGERY

## 2020-09-09 NOTE — LETTER
[facility]  Tan Walls MD FACS  Phone- [phone]  Fax [phone]    Pt Name: Qamar Roe Record Number: [id number]  Date of Birth 1950   Today's Date: 9/9/2020    Aidan Perkins was evaluated in the office today. My assessment and plans are listed below. Assessment:     Jenifer Blackwell was seen today for post-op check. Diagnoses and all orders for this visit:    S/P lumpectomy, right breast    Malignant neoplasm of upper-outer quadrant of right breast in female, estrogen receptor positive ([facility] [address]         Plan:  1. Saw Dr Lucille Clements who said no chemo  2. Sees Rad Onc Friday, if no rads them start arimidex             If I can provide any additional assistance or you have any concerns, please feel free to contact me. Thank you for allowing to participate in the care of your patients. Sincerely,      Tan Walls MD FACS  [id number]  [facility], [address]  Office: [phone]  Fax: [phone]

## 2020-09-10 ENCOUNTER — HOSPITAL ENCOUNTER (OUTPATIENT)
Dept: PHYSICAL THERAPY | Age: 70
Setting detail: THERAPIES SERIES
Discharge: HOME | End: 2020-09-10
Payer: MEDICARE

## 2020-09-10 PROCEDURE — 97110 THERAPEUTIC EXERCISES: CPT

## 2020-09-10 PROCEDURE — 97140 MANUAL THERAPY 1/> REGIONS: CPT

## 2020-09-10 NOTE — DISCHARGE SUMMARY
[facility]  ONCOLOGY REHABILITATION  PHYSICAL THERAPY  [] DAILY NOTE [] PROGRESS NOTE [x] DISCHARGE NOTE    [x] [facility]     Date: 9/10/2020  Patient Name:  Yue Ferrer  :   MRN: [id number]      Referring Practitioner Anabel Lewis MD   Diagnosis Malignant neoplasm of upper-outer quadrant of right female breast [C50.411]  Estrogen receptor positive status (ER+) [Z17.0]    Treatment Diagnosis Posture abnormality, Z71.9   Date of Evaluation 8/3/20    Additional Pertinent History 20 R IDC ER/MI+, HER2-; 20 R lumpectomy with re-excision on 20; hx of osteopenia, osteoporosis, hypothyroidism       Functional Outcome Measure Used O: QuickDASH   Functional Outcome Score    0% (8/3/20) 0% (9/10/20)       Insurance: Primary: Payor: MEDICARE /  /  / ,   Secondary: BCBS   Authorization Information: Aquatics and modalities approved, no ionto, hot or cold packs   Visit # 2, 2/10 for progress note   Visits Allowed: unlimited   Recertification Date:    Physician Follow-Up: 20 Rad consult      SUBJECTIVE: Pt admits had a hematoma after first surgery and was aggravated with re-excision. States has been doing well and does not have to do chemo and radiation but will have to do the antihormonal therapy. Manual Treatments/Provider Interaction: Utilized PORi protocol to right upper quarter for gentle manual lymphatic drainage and myofascial release x30 minutes. Note hematoma at R breast at incision site with scant serous drainage at end of session. R shoulder PROM flexion and abduction 180 deg.     Upper Extremity Circumferential Measurements     Right (cm) Left (cm) Comments   Met Heads 19.3 18.4     Ulnar Styloid Process 16 16     10 cm distal to Lateral Epicondyle 25.9 24.6     Elbow Crease 26.8 26.5     10 cm proximal from Lateral Epicondyle 34.4 32.6     Axilla 35.5 34.9     Total 157.9 153   8/3/20   155.9   154.4      7/31/20                        158.8   155. 2       Specific Interventions for Next Tredatment:  N/A    Activity Tolerance: Patient tolerated treatment well    ASSESSMENT:  Assessment: Pt has returned to her baseline status and as she did not have a SLNB, she is not at risk for lymphedema. She has no further need for skilled services as this time and all questions have been answered. GOALS:  Patient Goal: Get back to normal     Short Term Goals to be met in 12 weeks:  1. See LTGs     Long Term Goals to be met in 12 weeks:   1. Pt to demo QuickDASH score returned to 0% impaired following surgery for full return to prior level of functioning. GOAL MET:  0%. Discontinue Goal    2. Pt to demo R shoulder PROM flexion and abduction returned to 180 deg following surgery for ease with ADLs and meal prep. GOAL MET:  180 deg. Discontinue Goal    3. Pt to demo R shoulder strength returned to 4+/5 for meal prep following surgery. GOAL MET:  4+/5 . Discontinue Goal    4. Pt to demo improved posture with neutral head and neck alignment for ease with reaching tasks. GOAL MET:  Excellent posture. Discontinue Goal     5. Pt to demo controlled R UE girth measures of <160 cm with independence with lymphedema risk reduction strategies if pt has sentinel lymph node biopsy to reduce risk of secondary complications. GOAL MET:  157.9 cm. Discontinue Goal      Patient Education: To perform self MLD to soften hematoma and improve drainage of R breast. Discharge today.   Education Outcome: Verbalized understanding  Education Barriers: None    PLAN: Discharge      Time In 1020   Time Out 1100   Timed Code Minutes: 40 min   Total Treatment Time: 40 min       Electronically Signed by: Surinder Marquez PT, DPT, [facility] [id number] 9/10/2020

## 2020-09-11 ENCOUNTER — HOSPITAL ENCOUNTER (OUTPATIENT)
Dept: RADIATION ONCOLOGY | Age: 70
Discharge: HOME | End: 2020-09-11
Payer: MEDICARE

## 2020-09-11 VITALS
DIASTOLIC BLOOD PRESSURE: 78 MMHG | RESPIRATION RATE: 16 BRPM | SYSTOLIC BLOOD PRESSURE: 170 MMHG | HEART RATE: 76 BPM | OXYGEN SATURATION: 95 % | BODY MASS INDEX: 25.95 KG/M2 | HEIGHT: 64 IN | WEIGHT: 152 LBS | TEMPERATURE: 98 F

## 2020-09-11 PROCEDURE — 99202 OFFICE O/P NEW SF 15 MIN: CPT | Performed by: RADIOLOGY

## 2020-09-11 PROCEDURE — 99204 OFFICE O/P NEW MOD 45 MIN: CPT | Performed by: RADIOLOGY

## 2020-09-11 ASSESSMENT — ENCOUNTER SYMPTOMS
CONSTIPATION: 0
RECTAL PAIN: 0
NAUSEA: 0
BLOOD IN STOOL: 0
DIARRHEA: 0
COLOR CHANGE: 0
WHEEZING: 0
SHORTNESS OF BREATH: 0
FACIAL SWELLING: 0
ABDOMINAL DISTENTION: 0
ABDOMINAL PAIN: 0
EYE DISCHARGE: 0
BACK PAIN: 0
TROUBLE SWALLOWING: 0
COUGH: 0
SORE THROAT: 0
VOMITING: 0
CHEST TIGHTNESS: 0

## 2020-09-11 NOTE — PROGRESS NOTES
[facility], [address] [facility] [address]  [address]  Dept: [phone]  Dept Fax: [phone]: [phone]    Visit Date:8/26/2020     Kezia Barry is a 69 y.o. female who presents today for:   Chief Complaint   Patient presents with    New Patient     RIGHT BREAST CANCER        HPI:   This is a 65-year-old female with newly diagnosed right breast cancer. She presents to the medical oncology clinic to discuss postsurgical treatment. The patient had annual mammogram in October 2019. It showed possible irregular density with architectural distortion in the left breast.  Additional compression and lateromedial views were recommended as well as left breast ultrasound. There was no sonographic correlate for the irregular left breast mammographic abnormality. Stereotactic guided biopsy was recommended. In addition ultrasound revealed an 0.8 cm x 0.7 cm oval lesion in the left breast upper outer aspect. Differential diagnosis included complex cyst or a solid mass. Diagnostic aspiration or possible core biopsy was recommended. Left breast core needle biopsy on October 23, 2019 showed atypical ductal hyperplasia, atypical lobular hyperplasia. On November 7, 2019 the patient underwent left breast lumpectomy that showed only focal atypical ductal hyperplasia, focal atypical lobular hyperplasia. On July 15, 2020 the patient underwent bilateral breast MRI, it showed to 1.9 cm x 1.1 cm irregular spiculated mass in the right breast at the 10 o'clock position. There were no abnormal looking axillary lymph nodes. Ultrasound of the right breast on July 23, 2020 confirmed the presence of irregular mass.   The same day the patient underwent ultrasound-guided biopsy which showed invasive ductal carcinoma, grade 2, estrogen receptor positive in 100% of cells, progesterone receptor positive and 60% of cells, HER-2 by IHC was equivocal 2+ but FISH was nonamplified  She  met with the surgeon Dr. Monique Posada to discuss further surgical management. She decided to proceed with right lumpectomy. Due to age of 69 and clinically negative lymph node sentinel lymph node biopsy was omitted. Final pathology report showed: Jared Drake, right, lumpectomy:    Invasive ductal carcinoma, [place] grade 1, stage pT1c NX.    Ductal carcinoma in situ, low nuclear grade, cribriform and   micropapillary types.     Cranial margin positive for invasive carcinoma.    Changes consistent with previous biopsy site.       Fibrocystic changes including usual ductal hyperplasia, microcyst       formation, dense fibrosis, and columnar cell change. BRolan Noelle, new lateral margin, excision:    Changes consistent with previous biopsy site.       Microcalcifications.    Negative for malignancy. Oncotype DX Breast Cancer Assay (RT-PCR) performed by [employer]   Breast Cancer Recurrence Score:   12     Due to positive cranial margin on August 24, 2020 she underwent reexcision of the cranial margin, it was negative for malignancy. Patient presents to the medical oncology clinic to discuss post surgical treatment    Family history:  One sister was diagnosed with breast cancer around age 62. Her sisters daughter (Ms. Dee Chavarria niece) was diagnosed with cervical cancer. Her brother's daughter (Ms. Dee Chavarria niece) was diagnosed with colon cancer under age 48.       Ms. Dee Chavarria mother was diagnosed with breast cancer at age 46.      She has had genetic testing by [employer] [facility] Next -Expanded panel, results are pending  HPI   Past Medical History:   Diagnosis Date    Acquired hypothyroidism 5/25/2017    Atypical lobular hyperplasia [facility]) of left breast 11/4/2019    Hyperlipidemia     Invasive ductal carcinoma of breast ([facility] [address] 07/2020    right    Osteopenia 5/25/2017    Osteoporosis 12/29/2016      Past Surgical History:   Procedure Laterality Date    APPENDECTOMY  1997    BREAST LUMPECTOMY Right 8/12/2020 RIGHT BREAST LUMPECTOMY WITH PRIOR NEEDLE LOC PLACEMENT and new lateral margin performed by [facility] Luz MD at [facility] LUMPECT[facility] Right 2020    RE-EXCISION CARNIAL MARGIN RIGHT BREAST performed by Sri Luz MD at [address] [place] [address] BREAST LUMPECTOMY Left 2020    BREAST SURGERY Left 2019    LEFT BREAST EXCISIONAL BIOPSY WITH NEEDLE LOCALIZATION PLACEMENT performed by [facility] Luz MD at [facility]    ruptured ovarian cyst    SACHI US GUID NDL BIOPSY RIGHT  2020    SACHI US GUID NDL BIOPSY RIGHT 2020 [facility] Land [facility]    TONSILLECTOMY  child    US GUIDED NEEDLE LOC OF RIGHT BREAST  2020    US GUIDED NEEDLE LOC OF RIGHT BREAST 2020 [facility] rt & Lt      Family History   Problem Relation Age of Onset    Diabetes Mother     Heart Disease Mother         afib and pacer    High Blood Pressure Mother     Breast Cancer Mother 46    Breast Cancer Sister 62    Dementia Maternal Uncle         alzheimer's    Dementia Maternal Aunt         alzheimer's    Colon Cancer Niece 39    COPD Father     No Known Problems Brother     No Known Problems Brother     Ovarian Cancer Neg Hx       Social History     Tobacco Use    Smoking status: Former Smoker     Packs/day: 0.25     Years: 20.00     Pack years: 5.00     Last attempt to quit: 1986     Years since quittin.7    Smokeless tobacco: Never Used   Substance Use Topics    Alcohol use: No     Alcohol/week: 0.0 standard drinks      Current Outpatient Medications   Medication Sig Dispense Refill    NONFORMULARY Take by mouth 2 times daily CALCIUM CALTRATE 600 TWICE A DAY      atorvastatin (LIPITOR) 20 MG tablet TAKE 1 TABLET EVERY DAY 90 tablet 3    aspirin EC 81 MG EC tablet Take 1 tablet by mouth daily 30 tablet 3    Denosumab (PROLIA SC) Inject into the skin every 6 months       No current facility-administered medications for this visit. No Known Allergies   Health Maintenance   Topic Date Due    Annual Wellness Visit (AWV)  05/29/2019    Flu vaccine (1) 09/01/2020    Lipid screen  07/01/2021    TSH testing  07/01/2021    Breast cancer screen  08/12/2021    DTaP/Tdap/Td vaccine (2 - Td) 08/14/2023    Colon cancer screen colonoscopy  01/29/2026    DEXA (modify frequency per FRAX score)  Completed    Shingles Vaccine  Completed    Pneumococcal 65+ years Vaccine  Completed    Hepatitis C screen  Completed    Hepatitis A vaccine  Aged Out    Hepatitis B vaccine  Aged Out    Hib vaccine  Aged Out    Meningococcal (ACWY) vaccine  Aged Out        Subjective:   Review of Systems   Constitutional: Negative for activity change, appetite change, fatigue and fever. HENT: Negative for congestion, dental problem, facial swelling, hearing loss, mouth sores, nosebleeds, sore throat, tinnitus and trouble swallowing. Eyes: Negative for discharge and visual disturbance. Respiratory: Negative for cough, chest tightness, shortness of breath and wheezing. Cardiovascular: Negative for chest pain, palpitations and leg swelling. Gastrointestinal: Negative for abdominal distention, abdominal pain, blood in stool, constipation, diarrhea, nausea, rectal pain and vomiting. Endocrine: Negative for cold intolerance, polydipsia and polyuria. Genitourinary: Negative for decreased urine volume, difficulty urinating, dysuria, flank pain, hematuria and urgency. Musculoskeletal: Negative for arthralgias, back pain, gait problem, joint swelling, myalgias and neck stiffness. Skin: Negative for color change, rash and wound. Neurological: Negative for dizziness, tremors, seizures, speech difficulty, weakness, light-headedness, numbness and headaches. Hematological: Negative for adenopathy. Does not bruise/bleed easily. Psychiatric/Behavioral: Negative for confusion and sleep disturbance. The patient is not nervous/anxious.          Objective: Physical Exam  Vitals signs reviewed. Constitutional:       General: She is not in acute distress. Appearance: She is well-developed. HENT:      Head: Normocephalic. Mouth/Throat:      Pharynx: No oropharyngeal exudate. Eyes:      General: No scleral icterus. Right eye: No discharge. Left eye: No discharge. Pupils: Pupils are equal, round, and reactive to light. Neck:      Musculoskeletal: Normal range of motion and neck supple. Thyroid: No thyromegaly. Vascular: No JVD. Trachea: No tracheal deviation. Cardiovascular:      Rate and Rhythm: Normal rate. Heart sounds: Normal heart sounds. No murmur. No friction rub. No gallop. Pulmonary:      Effort: Pulmonary effort is normal. No respiratory distress. Breath sounds: Normal breath sounds. No stridor. No wheezing or rales. Chest:      Chest wall: No tenderness. Breasts:         Right: Skin change (status post lumpectomy. Incision is nicely healed) present. Left: Skin change (incision post lumpectomy) present. Abdominal:      General: Bowel sounds are normal. There is no distension. Palpations: Abdomen is soft. There is no mass. Tenderness: There is no abdominal tenderness. There is no rebound. Musculoskeletal: Normal range of motion. Comments: Good range of motion in all four extremities. Lymphadenopathy:      Cervical: No cervical adenopathy. Skin:     General: Skin is warm. Findings: No erythema or rash. Neurological:      Mental Status: She is alert and oriented to person, place, and time. Cranial Nerves: No cranial nerve deficit. Motor: No abnormal muscle tone. Deep Tendon Reflexes: Reflexes are normal and symmetric. Psychiatric:         Behavior: Behavior normal.         Thought Content:  Thought content normal.         Judgment: Judgment normal.         BP (!) 159/72 (Site: Left Upper Arm, Position: Sitting, Cuff Size: Medium Adult) Pulse 101   Temp 98.3 °F (36.8 °C) (Infrared)   Resp 18   Ht 5' 4\" (1.626 m)   Wt 153 lb (69.4 kg)   SpO2 98%   BMI 26.26 kg/m²      ECOG status is 0    Imaging studies and labs:   No results found. Lab Results   Component Value Date    WBC 6.9 07/01/2020    HGB 15.3 07/01/2020    HCT 50.2 (H) 07/01/2020    .4 (H) 07/01/2020     07/01/2020       Chemistry        Component Value Date/Time     07/01/2020 1403    K 4.5 07/01/2020 1403     07/01/2020 1403    CO2 26 07/01/2020 1403    BUN 13 07/01/2020 1403    CREATININE 0.7 07/01/2020 1403        Component Value Date/Time    CALCIUM 10.0 07/01/2020 1403    ALKPHOS 69 07/01/2020 1403    AST 19 07/01/2020 1403    ALT 13 07/01/2020 1403    BILITOT 0.5 07/01/2020 1403            Assessment/Plan:   1. Stage IA (cT1c, cN0, cM0, G2, ER+, CT+, HER2-) right breast cancer  S/p lumpectomy. She presents to the [address] [place] [address] to discuss further management. The patient was informed that she has an early stage of breast cancer that does not require systemic chemotherapy. She had Oncotype DX assay that showed recurrence score 12. The Oncotype Dx 21-gene recurrence score (RS) is the best-validated prognostic and predictive assay to identify patients who are most and least likely to derive benefit from adjuvant chemotherapy. RS 25 or below identifies women with low risk of disease recurrence for whom chemotherapy is unlikely to provide a benefit. The patient belongs  to that group. Next, we discuss the role of radiation therapy. I presented the patient with data from two studies that evaluated the benefit of postlumpectomy radiation in older patients with stage I hormone responsive breast cancer. The CALGB 9343 study enrolled patients 79years of age and older.  The results showed 10% risk of local disease recurrence at 10 years in women treated with tamoxifen only ( no radiation therapy) in contrast to 2% risk of local disease Greater than 50% of time was spent on counseling and coordinating her care. Face to face counseling included prognosis, risks, benefits of treatment, compliance and risk reduction.        Electronically signed by Audrey Garcia MD on 9/11/20 at 2:00 PM EDT

## 2020-09-11 NOTE — PROGRESS NOTES
Adithya Pratt  9/11/2020    Chaperone: Yes    Advanced Directives     Power of [address] Will    Not on File Not on File        Living will and POA, Radha Hoff (sister) is medical POA.      Temp Readings from Last 2 Encounters:   09/11/20 98 °F (36.7 °C) (Infrared)   09/09/20 97.8 °F (36.6 °C) (Temporal)     BP Readings from Last 2 Encounters:   09/11/20 (!) 170/78   09/09/20 120/62     Pulse Readings from Last 2 Encounters:   09/11/20 76   09/09/20 53        Wt Readings from Last 3 Encounters:   09/11/20 152 lb (68.9 kg)   09/09/20 153 lb (69.4 kg)   08/26/20 153 lb (69.4 kg)          Lab Results   Component Value Date    CREATININE 0.7 07/01/2020     Lab Results   Component Value Date    BUN 13 07/01/2020       Mediport: no    Pacemaker/ICD: no    Previous XRT: no    Past Medical History:   Diagnosis Date    Acquired hypothyroidism 5/25/2017    Atypical lobular hyperplasia ([facility]) of left breast 11/4/2019    Hyperlipidemia     Invasive ductal carcinoma of breast ([facility] [address] [address] 07/2020    right    Osteopenia 5/25/2017    Osteoporosis 12/29/2016     Past Surgical History:   Procedure Laterality Date    APPENDECTOMY  1997    BREAST LUMPECTOMY Right 8/12/2020    RIGHT BREAST LUMPECTOMY WITH PRIOR NEEDLE LOC PLACEMENT and new lateral margin performed by Lien Morley MD at [facility] Right 8/24/2020    RE-EXCISION CARNIAL MARGIN RIGHT BREAST performed by Lien Morley MD at [address] LUMPECTOMY Left 07/2020    BREAST SURGERY Left 11/7/2019    LEFT BREAST EXCISIONAL BIOPSY WITH NEEDLE LOCALIZATION PLACEMENT performed by Lien Morley MD at [facility]    ruptured ovarian cyst    SACHI [address] BIOPSY RIGHT  7/23/2020    SACHI [address] RIGHT 7/23/2020 Alon Crook [facility]    TONSILLECTOMY  child    US GUIDED NEEDLE LOC OF RIGHT BREAST  8/12/2020    US GUIDED NEEDLE LOC OF RIGHT BREAST 8/12/2020 [facility] rt & Lt       No Known Allergies       Current Outpatient Medications:     Denosumab (PROLIA SC), Inject into the skin every 6 months, Disp: , Rfl:     NONFORMULARY, Take by mouth 2 times daily CALCIUM CALTRATE 600 TWICE A DAY, Disp: , Rfl:     atorvastatin (LIPITOR) 20 MG tablet, TAKE 1 TABLET EVERY DAY, Disp: 90 tablet, Rfl: 3    aspirin EC 81 MG EC tablet, Take 1 tablet by mouth daily, Disp: 30 tablet, Rfl: 3      ADDITIONAL COMMENTS: Seen in Consultation with Dr. Danial Ji.        Miriam MILLIGANN, RN

## 2020-09-11 NOTE — PROGRESS NOTES
Radiation Oncology Consultation  Encounter Date: 2020 5:27 PM    Ms. Med Franco is a 69 y.o. female  : 1950  MRN: [id number]  [place] Number: [de-identified]  Requesting Provider: Dr. Savage Thomas    Reason for request: Evaluation for adjuvant radiation therapy      CONSULTANT: Mana Angel      CHIEF COMPLAINT: Evaluation for adjuvant whole breast radiation therapy      HISTORY OF PRESENT ILLNESS:   As per [facility] note on 2020, This is a 67-year-old female with newly diagnosed right breast cancer. She presents to the medical oncology clinic to discuss postsurgical treatment. The patient had annual mammogram in 2019. It showed possible irregular density with architectural distortion in the left breast.  Additional compression and lateromedial views were recommended as well as left breast ultrasound. There was no sonographic correlate for the irregular left breast mammographic abnormality. Stereotactic guided biopsy was recommended. In addition ultrasound revealed an 0.8 cm x 0.7 cm oval lesion in the left breast upper outer aspect. Differential diagnosis included complex cyst or a solid mass. Diagnostic aspiration or possible core biopsy was recommended. Left breast core needle biopsy on 2019 showed atypical ductal hyperplasia, atypical lobular hyperplasia. On 2019 the patient underwent left breast lumpectomy that showed only focal atypical ductal hyperplasia, focal atypical lobular hyperplasia. On July 15, 2020 the patient underwent bilateral breast MRI, it showed to 1.9 cm x 1.1 cm irregular spiculated mass in the right breast at the 10 o'clock position. There were no abnormal looking axillary lymph nodes. Ultrasound of the right breast on 2020 confirmed the presence of irregular mass.   The same day the patient underwent ultrasound-guided biopsy which showed invasive ductal carcinoma, grade 2, estrogen receptor positive in 100% of cells, progesterone receptor positive and 60% of cells, HER-2 by IHC was equivocal 2+ but FISH was nonamplified  She  met with the surgeon Dr. Halle Blankenship to discuss further surgical management. She decided to proceed with right lumpectomy. Due to age of 69 and clinically negative lymph node sentinel lymph node biopsy was omitted. Final pathology report showed: Leticia Epstein, right, lumpectomy:    Invasive ductal carcinoma, [place] grade 1, stage pT1c NX.    Ductal carcinoma in situ, low nuclear grade, cribriform and   micropapillary types.     Cranial margin positive for invasive carcinoma.    Changes consistent with previous biopsy site.       Fibrocystic changes including usual ductal hyperplasia, microcyst       formation, dense fibrosis, and columnar cell change. Nasim Epstein, new lateral margin, excision:    Changes consistent with previous biopsy site.       Microcalcifications.    Negative for malignancy.      Oncotype DX Breast Cancer Assay (RT-PCR) performed by [employer]   Breast Cancer Recurrence Score:   12      Due to positive cranial margin on August 24, 2020 she underwent reexcision of the cranial margin, it was negative for malignancy. Patient recently seen by medical oncology for further discussion of systemic adjuvant therapy, no chemotherapy was recommended based upon her low Oncotype DX recurrence score, however AI therapy was recommended. She presents today to the [facility]) for consultation regarding adjuvant radiation therapy. She was previously seen in breast [facility] on 07/31/2020 prior to surgical resection of disease. Today she is healing well from her recent surgery for re-resection of her positive margin. She denies any new Right breast complaints, denies any new pain or tenderness, swelling or redness.  She is otherwise well and has no other general complaints, denies headache, blurry vision, neurological deficits, cough, shortness of breath, abd pain, or /GI Food insecurity     Worry: Not on file     Inability: Not on file    Transportation needs     Medical: Not on file     Non-medical: Not on file   Tobacco Use    Smoking status: Former Smoker     Packs/day: 0.25     Years: 20.00     Pack years: 5.00     Last attempt to quit: 1986     Years since quittin.7    Smokeless tobacco: Never Used   Substance and Sexual Activity    Alcohol use: No     Alcohol/week: 0.0 standard drinks    Drug use: No    Sexual activity: Not on file   Lifestyle    Physical activity     Days per week: Not on file     Minutes per session: Not on file    Stress: Not on file   Relationships    Social connections     Talks on phone: Not on file     Gets together: Not on file     Attends [employer] service: Not on file     Active member of club or organization: Not on file     Attends meetings of clubs or organizations: Not on file     Relationship status: Not on file    Intimate partner violence     Fear of current or ex partner: Not on file     Emotionally abused: Not on file     Physically abused: Not on file     Forced sexual activity: Not on file   Other Topics Concern    Not on file   Social History Narrative    Not on file         ALLERGIES: No Known Allergies       Current Outpatient Medications   Medication Sig Dispense Refill    Denosumab (PROLIA SC) Inject into the skin every 6 months      NONFORMULARY Take by mouth 2 times daily CALCIUM CALTRATE 600 TWICE A DAY      atorvastatin (LIPITOR) 20 MG tablet TAKE 1 TABLET EVERY DAY 90 tablet 3    aspirin EC 81 MG EC tablet Take 1 tablet by mouth daily 30 tablet 3     No current facility-administered medications for this encounter.       No outpatient medications have been marked as taking for the 20 encounter [facility] Encounter) with Sara Ordonez MD.          LABORATORY STUDIES:    CBC:   Lab Results   Component Value Date    WBC 6.9 2020    RBC 4.90 2020    HGB 15.3 2020    HCT 50.2 benefits and rationale for adjuvant RT were discussed and the patient is agreeable  -Will plan for hypofractionated course of RT with treatment course of 3 weeks  -Mrs. Estiven Melton will return for CT simulation for treatment planning in the next 1- 2 weeks w/ RT start shortly afterwards  -She has our clinic number to call with any questions or concerns if needed prior to next visit. Thank you for allowing my assistance in the care of your patient. Electronically signed by Sari Shaw MD on 9/11/2020 at 5:27 PM       ATTESTATION: 90 minutes spent actively reviewing patient data; 60 minutes face-to-face time,  >50% spent in counseling/discussion/education. CC: Jose A Cuenca  ACC: [id number]

## 2020-09-15 ENCOUNTER — HOSPITAL ENCOUNTER (OUTPATIENT)
Dept: CT IMAGING | Age: 70
Discharge: HOME | End: 2020-09-15
Payer: MEDICARE

## 2020-09-15 ENCOUNTER — HOSPITAL ENCOUNTER (OUTPATIENT)
Dept: RADIATION ONCOLOGY | Age: 70
Discharge: HOME | End: 2020-09-15
Attending: RADIOLOGY
Payer: MEDICARE

## 2020-09-15 PROCEDURE — 77290 THER RAD SIMULAJ FIELD CPLX: CPT | Performed by: RADIOLOGY

## 2020-09-15 PROCEDURE — 3209999900 CT GUIDE RADIATION THERAPY NO CHARGE

## 2020-09-15 PROCEDURE — 77263 THER RADIOLOGY TX PLNG CPLX: CPT | Performed by: RADIOLOGY

## 2020-09-15 PROCEDURE — 77332 RADIATION TREATMENT AID(S): CPT | Performed by: RADIOLOGY

## 2020-09-28 PROCEDURE — 77295 3-D RADIOTHERAPY PLAN: CPT | Performed by: RADIOLOGY

## 2020-09-28 PROCEDURE — 77334 RADIATION TREATMENT AID(S): CPT | Performed by: RADIOLOGY

## 2020-09-28 PROCEDURE — 77300 RADIATION THERAPY DOSE PLAN: CPT | Performed by: RADIOLOGY

## 2020-09-29 PROCEDURE — 77334 RADIATION TREATMENT AID(S): CPT | Performed by: RADIOLOGY

## 2020-09-30 ENCOUNTER — HOSPITAL ENCOUNTER (OUTPATIENT)
Dept: RADIATION ONCOLOGY | Age: 70
Discharge: HOME | End: 2020-09-30
Attending: RADIOLOGY
Payer: MEDICARE

## 2020-09-30 PROCEDURE — 77412 RADIATION TX DELIVERY LVL 3: CPT | Performed by: RADIOLOGY

## 2020-09-30 PROCEDURE — 77280 THER RAD SIMULAJ FIELD SMPL: CPT | Performed by: RADIOLOGY

## 2020-10-01 ENCOUNTER — CLINICAL DOCUMENTATION (OUTPATIENT)
Dept: ONCOLOGY | Age: 70
End: 2020-10-01

## 2020-10-01 ENCOUNTER — HOSPITAL ENCOUNTER (OUTPATIENT)
Dept: RADIATION ONCOLOGY | Age: 70
Discharge: HOME | End: 2020-10-01
Attending: RADIOLOGY
Payer: MEDICARE

## 2020-10-01 PROCEDURE — 77387 GUIDANCE FOR RADJ TX DLVR: CPT | Performed by: RADIOLOGY

## 2020-10-01 PROCEDURE — 77412 RADIATION TX DELIVERY LVL 3: CPT | Performed by: RADIOLOGY

## 2020-10-01 PROCEDURE — G6002 STEREOSCOPIC X-RAY GUIDANCE: HCPCS | Performed by: RADIOLOGY

## 2020-10-01 NOTE — PROGRESS NOTES
Name: Kailey Chung  :   MRN: [id number]    Oncology Navigation Follow-Up Note    Contact Type:  Radiation Oncology    Notes: Pt seen following radiation treatment. States she is doing well, denies any issues with skin. Has and is using recommended skin care products. Questions addressed regarding genetic testing.       Electronically signed by Camille Malin RN on 10/1/2020 at 12:12 PM

## 2020-10-02 ENCOUNTER — HOSPITAL ENCOUNTER (OUTPATIENT)
Dept: RADIATION ONCOLOGY | Age: 70
Discharge: HOME | End: 2020-10-02
Attending: RADIOLOGY
Payer: MEDICARE

## 2020-10-02 PROCEDURE — 99214 OFFICE O/P EST MOD 30 MIN: CPT | Performed by: NURSE PRACTITIONER

## 2020-10-02 PROCEDURE — 77387 GUIDANCE FOR RADJ TX DLVR: CPT | Performed by: RADIOLOGY

## 2020-10-02 PROCEDURE — 77412 RADIATION TX DELIVERY LVL 3: CPT | Performed by: RADIOLOGY

## 2020-10-02 PROCEDURE — G6002 STEREOSCOPIC X-RAY GUIDANCE: HCPCS | Performed by: RADIOLOGY

## 2020-10-05 ENCOUNTER — HOSPITAL ENCOUNTER (OUTPATIENT)
Dept: RADIATION ONCOLOGY | Age: 70
Discharge: HOME | End: 2020-10-05
Attending: RADIOLOGY
Payer: MEDICARE

## 2020-10-05 PROCEDURE — 77412 RADIATION TX DELIVERY LVL 3: CPT | Performed by: RADIOLOGY

## 2020-10-05 PROCEDURE — 77336 RADIATION PHYSICS CONSULT: CPT | Performed by: RADIOLOGY

## 2020-10-05 PROCEDURE — 77387 GUIDANCE FOR RADJ TX DLVR: CPT | Performed by: RADIOLOGY

## 2020-10-05 PROCEDURE — G6002 STEREOSCOPIC X-RAY GUIDANCE: HCPCS | Performed by: RADIOLOGY

## 2020-10-06 ENCOUNTER — APPOINTMENT (OUTPATIENT)
Dept: RADIATION ONCOLOGY | Age: 70
End: 2020-10-06
Attending: RADIOLOGY
Payer: MEDICARE

## 2020-10-06 ENCOUNTER — HOSPITAL ENCOUNTER (OUTPATIENT)
Dept: RADIATION ONCOLOGY | Age: 70
Discharge: HOME | End: 2020-10-06
Attending: RADIOLOGY
Payer: MEDICARE

## 2020-10-06 PROCEDURE — 77412 RADIATION TX DELIVERY LVL 3: CPT | Performed by: RADIOLOGY

## 2020-10-06 PROCEDURE — 77427 RADIATION TX MANAGEMENT X5: CPT | Performed by: RADIOLOGY

## 2020-10-06 PROCEDURE — G6002 STEREOSCOPIC X-RAY GUIDANCE: HCPCS | Performed by: RADIOLOGY

## 2020-10-06 PROCEDURE — 77387 GUIDANCE FOR RADJ TX DLVR: CPT | Performed by: RADIOLOGY

## 2020-10-07 ENCOUNTER — HOSPITAL ENCOUNTER (OUTPATIENT)
Dept: RADIATION ONCOLOGY | Age: 70
Discharge: HOME | End: 2020-10-07
Attending: RADIOLOGY
Payer: MEDICARE

## 2020-10-07 PROCEDURE — 77387 GUIDANCE FOR RADJ TX DLVR: CPT | Performed by: RADIOLOGY

## 2020-10-07 PROCEDURE — G6002 STEREOSCOPIC X-RAY GUIDANCE: HCPCS | Performed by: RADIOLOGY

## 2020-10-07 PROCEDURE — 77412 RADIATION TX DELIVERY LVL 3: CPT | Performed by: RADIOLOGY

## 2020-10-08 ENCOUNTER — HOSPITAL ENCOUNTER (OUTPATIENT)
Dept: RADIATION ONCOLOGY | Age: 70
Discharge: HOME | End: 2020-10-08
Attending: RADIOLOGY
Payer: MEDICARE

## 2020-10-08 PROCEDURE — G6002 STEREOSCOPIC X-RAY GUIDANCE: HCPCS | Performed by: RADIOLOGY

## 2020-10-08 PROCEDURE — 77412 RADIATION TX DELIVERY LVL 3: CPT | Performed by: RADIOLOGY

## 2020-10-08 PROCEDURE — 77387 GUIDANCE FOR RADJ TX DLVR: CPT | Performed by: RADIOLOGY

## 2020-10-09 ENCOUNTER — HOSPITAL ENCOUNTER (OUTPATIENT)
Dept: RADIATION ONCOLOGY | Age: 70
Discharge: HOME | End: 2020-10-09
Attending: RADIOLOGY
Payer: MEDICARE

## 2020-10-09 PROCEDURE — 77412 RADIATION TX DELIVERY LVL 3: CPT | Performed by: RADIOLOGY

## 2020-10-09 PROCEDURE — G6002 STEREOSCOPIC X-RAY GUIDANCE: HCPCS | Performed by: RADIOLOGY

## 2020-10-09 PROCEDURE — 77387 GUIDANCE FOR RADJ TX DLVR: CPT | Performed by: RADIOLOGY

## 2020-10-12 ENCOUNTER — HOSPITAL ENCOUNTER (OUTPATIENT)
Dept: RADIATION ONCOLOGY | Age: 70
Discharge: HOME | End: 2020-10-12
Attending: RADIOLOGY
Payer: MEDICARE

## 2020-10-12 PROCEDURE — 77412 RADIATION TX DELIVERY LVL 3: CPT | Performed by: RADIOLOGY

## 2020-10-12 PROCEDURE — 77387 GUIDANCE FOR RADJ TX DLVR: CPT | Performed by: RADIOLOGY

## 2020-10-12 PROCEDURE — 77336 RADIATION PHYSICS CONSULT: CPT | Performed by: RADIOLOGY

## 2020-10-12 PROCEDURE — G6002 STEREOSCOPIC X-RAY GUIDANCE: HCPCS | Performed by: RADIOLOGY

## 2020-10-13 ENCOUNTER — HOSPITAL ENCOUNTER (OUTPATIENT)
Dept: RADIATION ONCOLOGY | Age: 70
Discharge: HOME | End: 2020-10-13
Attending: RADIOLOGY
Payer: MEDICARE

## 2020-10-13 PROCEDURE — G6002 STEREOSCOPIC X-RAY GUIDANCE: HCPCS | Performed by: RADIOLOGY

## 2020-10-13 PROCEDURE — 77412 RADIATION TX DELIVERY LVL 3: CPT | Performed by: RADIOLOGY

## 2020-10-13 PROCEDURE — 77427 RADIATION TX MANAGEMENT X5: CPT | Performed by: RADIOLOGY

## 2020-10-13 PROCEDURE — 77387 GUIDANCE FOR RADJ TX DLVR: CPT | Performed by: RADIOLOGY

## 2020-10-14 ENCOUNTER — HOSPITAL ENCOUNTER (OUTPATIENT)
Dept: RADIATION ONCOLOGY | Age: 70
Discharge: HOME | End: 2020-10-14
Attending: RADIOLOGY
Payer: MEDICARE

## 2020-10-14 PROCEDURE — G6002 STEREOSCOPIC X-RAY GUIDANCE: HCPCS | Performed by: RADIOLOGY

## 2020-10-14 PROCEDURE — 77387 GUIDANCE FOR RADJ TX DLVR: CPT | Performed by: RADIOLOGY

## 2020-10-14 PROCEDURE — 77412 RADIATION TX DELIVERY LVL 3: CPT | Performed by: RADIOLOGY

## 2020-10-15 ENCOUNTER — HOSPITAL ENCOUNTER (OUTPATIENT)
Dept: RADIATION ONCOLOGY | Age: 70
Discharge: HOME | End: 2020-10-15
Attending: RADIOLOGY
Payer: MEDICARE

## 2020-10-15 PROCEDURE — 77412 RADIATION TX DELIVERY LVL 3: CPT | Performed by: RADIOLOGY

## 2020-10-15 PROCEDURE — 77387 GUIDANCE FOR RADJ TX DLVR: CPT | Performed by: RADIOLOGY

## 2020-10-15 PROCEDURE — G6002 STEREOSCOPIC X-RAY GUIDANCE: HCPCS | Performed by: RADIOLOGY

## 2020-10-16 ENCOUNTER — HOSPITAL ENCOUNTER (OUTPATIENT)
Dept: RADIATION ONCOLOGY | Age: 70
Discharge: HOME | End: 2020-10-16
Attending: RADIOLOGY
Payer: MEDICARE

## 2020-10-16 PROCEDURE — G6002 STEREOSCOPIC X-RAY GUIDANCE: HCPCS | Performed by: RADIOLOGY

## 2020-10-16 PROCEDURE — 77412 RADIATION TX DELIVERY LVL 3: CPT | Performed by: RADIOLOGY

## 2020-10-16 PROCEDURE — 77387 GUIDANCE FOR RADJ TX DLVR: CPT | Performed by: RADIOLOGY

## 2020-10-19 ENCOUNTER — HOSPITAL ENCOUNTER (OUTPATIENT)
Dept: RADIATION ONCOLOGY | Age: 70
Discharge: HOME | End: 2020-10-19
Attending: RADIOLOGY
Payer: MEDICARE

## 2020-10-19 PROCEDURE — 77412 RADIATION TX DELIVERY LVL 3: CPT | Performed by: RADIOLOGY

## 2020-10-19 PROCEDURE — G6002 STEREOSCOPIC X-RAY GUIDANCE: HCPCS | Performed by: RADIOLOGY

## 2020-10-19 PROCEDURE — 77336 RADIATION PHYSICS CONSULT: CPT | Performed by: RADIOLOGY

## 2020-10-19 PROCEDURE — 77387 GUIDANCE FOR RADJ TX DLVR: CPT | Performed by: RADIOLOGY

## 2020-10-20 ENCOUNTER — HOSPITAL ENCOUNTER (OUTPATIENT)
Dept: RADIATION ONCOLOGY | Age: 70
Discharge: HOME | End: 2020-10-20
Attending: RADIOLOGY
Payer: MEDICARE

## 2020-10-20 VITALS
OXYGEN SATURATION: 97 % | BODY MASS INDEX: 25.83 KG/M2 | HEART RATE: 83 BPM | TEMPERATURE: 97.8 F | SYSTOLIC BLOOD PRESSURE: 152 MMHG | DIASTOLIC BLOOD PRESSURE: 92 MMHG | WEIGHT: 150.57 LBS | RESPIRATION RATE: 16 BRPM

## 2020-10-20 PROCEDURE — G6002 STEREOSCOPIC X-RAY GUIDANCE: HCPCS | Performed by: RADIOLOGY

## 2020-10-20 PROCEDURE — 77412 RADIATION TX DELIVERY LVL 3: CPT | Performed by: RADIOLOGY

## 2020-10-20 PROCEDURE — 77427 RADIATION TX MANAGEMENT X5: CPT | Performed by: RADIOLOGY

## 2020-10-20 PROCEDURE — 77387 GUIDANCE FOR RADJ TX DLVR: CPT | Performed by: RADIOLOGY

## 2020-10-20 NOTE — PROGRESS NOTES
Radha Herzogri  10/20/2020    Treatment Area: Breast     Current Total Dose(cGy): 4005  Current Fraction: 15/15  Final/Cumulative Rx. Dose (cGy): 0641    Patient was seen today for weekly visit. Wt Readings from Last 3 Encounters:   10/20/20 150 lb 9.2 oz (68.3 kg)   10/15/20 151 lb 10.8 oz (68.8 kg)   10/08/20 151 lb 0.2 oz (68.5 kg)     BP (!) 152/92   Pulse 83   Temp 97.8 °F (36.6 °C) (Infrared)   Resp 16   Wt 150 lb 9.2 oz (68.3 kg)   SpO2 97%   BMI 25.83 kg/m²   Lab Results   Component Value Date    WBC 6.9 07/01/2020     07/01/2020       Comfort Alteration  Fatigue:None, able to perform daily activities    Pain Intensity (Current): 0 No Pain  Pain Treatment: No treatment scheduled  Pain Relief: n/a  Hot Flashes/Flushes: None    Emotional Alteration:   Coping: effective    Nutritional Alteration  Anorexia: none   Nausea: No nausea noted  Vomiting: No vomiting   Dyspepsia/Heartburn: None    Skin Alteration   Skin reaction: Faint or dull erythema; follicular reaction    Ventilation Alteration  Cough: None  Hemoptysis: None  Dyspnea: Normal    Additional Comments: Eucerin and Aquaphor TID. Cortison 10 and Aloe Vera for itching.      Den MILLIGANN, RN

## 2020-11-05 ENCOUNTER — HOSPITAL ENCOUNTER (OUTPATIENT)
Dept: INFUSION THERAPY | Age: 70
Discharge: HOME | End: 2020-11-05
Payer: MEDICARE

## 2020-11-05 ENCOUNTER — OFFICE VISIT (OUTPATIENT)
Dept: ONCOLOGY | Age: 70
End: 2020-11-05
Payer: MEDICARE

## 2020-11-05 VITALS
TEMPERATURE: 98 F | RESPIRATION RATE: 18 BRPM | OXYGEN SATURATION: 99 % | SYSTOLIC BLOOD PRESSURE: 136 MMHG | HEIGHT: 64 IN | DIASTOLIC BLOOD PRESSURE: 72 MMHG | BODY MASS INDEX: 25.88 KG/M2 | WEIGHT: 151.6 LBS | HEART RATE: 80 BPM

## 2020-11-05 PROCEDURE — G8427 DOCREV CUR MEDS BY ELIG CLIN: HCPCS | Performed by: INTERNAL MEDICINE

## 2020-11-05 PROCEDURE — 4040F PNEUMOC VAC/ADMIN/RCVD: CPT | Performed by: INTERNAL MEDICINE

## 2020-11-05 PROCEDURE — 99211 OFF/OP EST MAY X REQ PHY/QHP: CPT

## 2020-11-05 PROCEDURE — 99214 OFFICE O/P EST MOD 30 MIN: CPT | Performed by: INTERNAL MEDICINE

## 2020-11-05 PROCEDURE — 3017F COLORECTAL CA SCREEN DOC REV: CPT | Performed by: INTERNAL MEDICINE

## 2020-11-05 PROCEDURE — G9899 SCRN MAM PERF RSLTS DOC: HCPCS | Performed by: INTERNAL MEDICINE

## 2020-11-05 PROCEDURE — G8484 FLU IMMUNIZE NO ADMIN: HCPCS | Performed by: INTERNAL MEDICINE

## 2020-11-05 PROCEDURE — 1036F TOBACCO NON-USER: CPT | Performed by: INTERNAL MEDICINE

## 2020-11-05 PROCEDURE — 1090F PRES/ABSN URINE INCON ASSESS: CPT | Performed by: INTERNAL MEDICINE

## 2020-11-05 PROCEDURE — G8417 CALC BMI ABV UP PARAM F/U: HCPCS | Performed by: INTERNAL MEDICINE

## 2020-11-05 PROCEDURE — G8399 PT W/DXA RESULTS DOCUMENT: HCPCS | Performed by: INTERNAL MEDICINE

## 2020-11-05 PROCEDURE — 1123F ACP DISCUSS/DSCN MKR DOCD: CPT | Performed by: INTERNAL MEDICINE

## 2020-11-05 RX ORDER — ANASTROZOLE 1 MG/1
1 TABLET ORAL DAILY
Qty: 90 TABLET | Refills: 3 | Status: SHIPPED | OUTPATIENT
Start: 2020-11-05 | End: 2021-09-03

## 2020-11-05 RX ORDER — ANASTROZOLE 1 MG/1
1 TABLET ORAL DAILY
Qty: 30 TABLET | Refills: 3 | Status: SHIPPED | OUTPATIENT
Start: 2020-11-05 | End: 2021-02-03 | Stop reason: SDUPTHER

## 2020-11-05 ASSESSMENT — ENCOUNTER SYMPTOMS
TROUBLE SWALLOWING: 0
ABDOMINAL DISTENTION: 0
COUGH: 0
NAUSEA: 0
SORE THROAT: 0
ABDOMINAL PAIN: 0
COLOR CHANGE: 0
SHORTNESS OF BREATH: 0
FACIAL SWELLING: 0
WHEEZING: 0
CONSTIPATION: 0
BLOOD IN STOOL: 0
BACK PAIN: 0
VOMITING: 0
EYE DISCHARGE: 0
CHEST TIGHTNESS: 0
RECTAL PAIN: 0
DIARRHEA: 0

## 2020-11-05 NOTE — PROGRESS NOTES
[facility] [address] [place] [address] [address]  [facility]  Dept: [phone]  Dept Fax: [phone]: [phone]    Visit Date:11/5/2020     Darryle Brazen is a 69 y.o. female who presents today for:   Chief Complaint   Patient presents with    Follow-up     Malignant neoplasm of upper-outer quadrant of right breast in female, estrogen receptor positive         HPI:   This is a 39-year-old female with newly diagnosed right breast cancer. She presents to the medical oncology clinic to discuss postsurgical treatment. The patient had annual mammogram in October 2019. It showed possible irregular density with architectural distortion in the left breast.  Additional compression and lateromedial views were recommended as well as left breast ultrasound. There was no sonographic correlate for the irregular left breast mammographic abnormality. Stereotactic guided biopsy was recommended. In addition ultrasound revealed an 0.8 cm x 0.7 cm oval lesion in the left breast upper outer aspect. Differential diagnosis included complex cyst or a solid mass. Diagnostic aspiration or possible core biopsy was recommended. Left breast core needle biopsy on October 23, 2019 showed atypical ductal hyperplasia, atypical lobular hyperplasia. On November 7, 2019 the patient underwent left breast lumpectomy that showed only focal atypical ductal hyperplasia, focal atypical lobular hyperplasia. On July 15, 2020 the patient underwent bilateral breast MRI, it showed to 1.9 cm x 1.1 cm irregular spiculated mass in the right breast at the 10 o'clock position. There were no abnormal looking axillary lymph nodes. Ultrasound of the right breast on July 23, 2020 confirmed the presence of irregular mass.   The same day the patient underwent ultrasound-guided biopsy which showed invasive ductal carcinoma, grade 2, estrogen receptor positive in 100% of cells, progesterone receptor positive and 60% of cells, HER-2 by IHC was equivocal 2+ but FISH was nonamplified  She  met with the surgeon Dr. Abimbola Becerril to discuss further surgical management. She decided to proceed with right lumpectomy. Due to age of 69 and clinically negative lymph node sentinel lymph node biopsy was omitted. Final pathology report showed: Tony Lennon, right, lumpectomy:    Invasive ductal carcinoma, [place] grade 1, stage pT1c NX.    Ductal carcinoma in situ, low nuclear grade, cribriform and   micropapillary types.     Cranial margin positive for invasive carcinoma.    Changes consistent with previous biopsy site.       Fibrocystic changes including usual ductal hyperplasia, microcyst       formation, dense fibrosis, and columnar cell change. BDelynn Peasant, new lateral margin, excision:    Changes consistent with previous biopsy site.       Microcalcifications.    Negative for malignancy. Oncotype DX Breast Cancer Assay (RT-PCR) performed by [employer]   Breast Cancer Recurrence Score:   12     Due to positive cranial margin on August 24, 2020 she underwent reexcision of the cranial margin, it was negative for malignancy. Patient presents to the medical oncology clinic to discuss post surgical treatment    Family history:  One sister was diagnosed with breast cancer around age 62. Her sisters daughter (Ms. Aranza Michael niece) was diagnosed with cervical cancer. Her brother's daughter (Ms. Aranza Michael niece) was diagnosed with colon cancer under age 48.       Ms. Aranza Michael mother was diagnosed with breast cancer at age 46. She has had genetic testing by [facility] Cancer Next -Expanded panel , no clinically significant mutations were detected. Interim history on member fifth 2020: The patient has completed postlumpectomy radiation treatment. She received total cumulative dose of 4005 cGy in 15 fractions. Overall she tolerated radiation treatment well.   She presents to the medical oncology clinic to discuss and start adjuvant hormonal treatment  , HPI   Past Medical History:   Diagnosis Date    Acquired hypothyroidism 2017    Atypical lobular hyperplasia [facility]) of left breast 2019    Hyperlipidemia     Invasive ductal carcinoma of breast ([address] 2020    right    Osteopenia 2017    Osteoporosis 2016      Past Surgical History:   Procedure Laterality Date    APPENDECTOMY  1997    BREAST LUMPECTOMY Right 2020    RIGHT BREAST LUMPECTOMY WITH PRIOR NEEDLE LOC PLACEMENT and new lateral margin performed [facility] Malissa [facility] LUMPECT[facility]Y Right 2020    RE-EXCISION CARNIAL MARGIN RIGHT BREAST performed by Malissa Jauregui MD at [address] [place] [address] BREAST LUMPECTOMY Left 2020    BREAST SURGERY Left 2019    LEFT BREAST EXCISIONAL BIOPSY WITH NEEDLE LOCALIZATION PLACEMENT performed by Malissa Jauregui MD at [facility]    ruptured ovarian cyst    SACHI US GUID NDL BIOPSY RIGHT  2020    SACHI [address] RIGHT 2020 Toshia Wyatt MD [facility]    TONSILLECTOMY  child    US GUIDED NEEDLE LOC OF RIGHT BREAST  2020    US GUIDED NEEDLE LOC OF RIGHT BREAST 2020 [facility] rt & Lt      Family History   Problem Relation Age of Onset    Diabetes Mother     Heart Disease Mother         afib and pacer    High Blood Pressure Mother     Breast Cancer Mother 46    Breast Cancer Sister 62    Dementia Maternal Uncle         alzheimer's    Dementia Maternal Aunt         alzheimer's    Colon Cancer Niece 39    COPD Father     No Known Problems Brother     No Known Problems Brother     Ovarian Cancer Neg Hx       Social History     Tobacco Use    Smoking status: Former Smoker     Packs/day: 0.25     Years: 20.00     Pack years: 5.00     Last attempt to quit: 1986     Years since quittin.8    Smokeless tobacco: Never Used   Substance Use Topics    Alcohol use: No     Alcohol/week: 0.0 standard drinks Current Outpatient Medications   Medication Sig Dispense Refill    anastrozole (ARIMIDEX) 1 MG tablet Take 1 tablet by mouth daily 30 tablet 3    anastrozole (ARIMIDEX) 1 MG tablet Take 1 tablet by mouth daily 90 tablet 3    Denosumab (PROLIA SC) Inject into the skin every 6 months      NONFORMULARY Take by mouth 2 times daily CALCIUM CALTRATE 600 TWICE A DAY      atorvastatin (LIPITOR) 20 MG tablet TAKE 1 TABLET EVERY DAY 90 tablet 3    aspirin EC 81 MG EC tablet Take 1 tablet by mouth daily 30 tablet 3     No current facility-administered medications for this visit. No Known Allergies   Health Maintenance   Topic Date Due    Annual Wellness Visit (AWV)  05/29/2019    Lipid screen  07/01/2021    TSH testing  07/01/2021    Breast cancer screen  08/12/2021    DTaP/Tdap/Td vaccine (2 - Td) 08/14/2023    Colon cancer screen colonoscopy  01/29/2026    DEXA (modify frequency per FRAX score)  Completed    Flu vaccine  Completed    Shingles Vaccine  Completed    Pneumococcal 65+ years Vaccine  Completed    Hepatitis C screen  Completed    Hepatitis A vaccine  Aged Out    Hepatitis B vaccine  Aged Out    Hib vaccine  Aged Out    Meningococcal (ACWY) vaccine  Aged Out        Subjective:   Review of Systems   Constitutional: Negative for activity change, appetite change, fatigue and fever. HENT: Negative for congestion, dental problem, facial swelling, hearing loss, mouth sores, nosebleeds, sore throat, tinnitus and trouble swallowing. Eyes: Negative for discharge and visual disturbance. Respiratory: Negative for cough, chest tightness, shortness of breath and wheezing. Cardiovascular: Negative for chest pain, palpitations and leg swelling. Gastrointestinal: Negative for abdominal distention, abdominal pain, blood in stool, constipation, diarrhea, nausea, rectal pain and vomiting. Endocrine: Negative for cold intolerance, polydipsia and polyuria.    Genitourinary: Negative for decreased urine volume, difficulty urinating, dysuria, flank pain, hematuria and urgency. Musculoskeletal: Negative for arthralgias, back pain, gait problem, joint swelling, myalgias and neck stiffness. Skin: Negative for color change, rash and wound. Neurological: Negative for dizziness, tremors, seizures, speech difficulty, weakness, light-headedness, numbness and headaches. Hematological: Negative for adenopathy. Does not bruise/bleed easily. Psychiatric/Behavioral: Negative for confusion and sleep disturbance. The patient is not nervous/anxious. Objective:   Physical Exam  Vitals signs reviewed. Constitutional:       General: She is not in acute distress. Appearance: She is well-developed. HENT:      Head: Normocephalic. Mouth/Throat:      Pharynx: No oropharyngeal exudate. Eyes:      General: No scleral icterus. Right eye: No discharge. Left eye: No discharge. Pupils: Pupils are equal, round, and reactive to light. Neck:      Musculoskeletal: Normal range of motion and neck supple. Thyroid: No thyromegaly. Vascular: No JVD. Trachea: No tracheal deviation. Cardiovascular:      Rate and Rhythm: Normal rate. Heart sounds: Normal heart sounds. No murmur. No friction rub. No gallop. Pulmonary:      Effort: Pulmonary effort is normal. No respiratory distress. Breath sounds: Normal breath sounds. No stridor. No wheezing or rales. Chest:      Chest wall: No tenderness. Breasts:         Right: Skin change (status post lumpectomy. Incision is nicely healed) present. Left: Skin change (incision post lumpectomy) present. Abdominal:      General: Bowel sounds are normal. There is no distension. Palpations: Abdomen is soft. There is no mass. Tenderness: There is no abdominal tenderness. There is no rebound. Musculoskeletal: Normal range of motion. Comments: Good range of motion in all four extremities. Lymphadenopathy:      Cervical: No cervical adenopathy. Skin:     General: Skin is warm. Findings: No erythema or rash. Neurological:      Mental Status: She is alert and oriented to person, place, and time. Cranial Nerves: No cranial nerve deficit. Motor: No abnormal muscle tone. Deep Tendon Reflexes: Reflexes are normal and symmetric. Psychiatric:         Behavior: Behavior normal.         Thought Content: Thought content normal.         Judgment: Judgment normal.         /72 (Site: Left Upper Arm, Position: Sitting, Cuff Size: Medium Adult)   Pulse 80   Temp 98 °F (36.7 °C) (Infrared)   Resp 18   Ht 5' 4\" (1.626 m)   Wt 151 lb 9.6 oz (68.8 kg)   SpO2 99%   BMI 26.02 kg/m²      ECOG status is 0    Imaging studies and labs:   No results found. Lab Results   Component Value Date    WBC 6.9 07/01/2020    HGB 15.3 07/01/2020    HCT 50.2 (H) 07/01/2020    .4 (H) 07/01/2020     07/01/2020       Chemistry        Component Value Date/Time     07/01/2020 1403    K 4.5 07/01/2020 1403     07/01/2020 1403    CO2 26 07/01/2020 1403    BUN 13 07/01/2020 1403    CREATININE 0.7 07/01/2020 1403        Component Value Date/Time    CALCIUM 10.0 07/01/2020 1403    ALKPHOS 69 07/01/2020 1403    AST 19 07/01/2020 1403    ALT 13 07/01/2020 1403    BILITOT 0.5 07/01/2020 1403            Assessment/Plan:   1. Stage IA (cT1c, cN0, cM0, G2, ER+, PA+, HER2-) right breast cancer  S/p lumpectomy. She had Oncotype DX assay that showed recurrence score 12. RS 25 or below identifies women with low risk of disease recurrence for whom chemotherapy is unlikely to provide a benefit. The patient belongs  to that group. Next, we discuss the role of radiation therapy. I presented the patient with data from two studies that evaluated the benefit of postlumpectomy radiation in older patients with stage I hormone responsive breast cancer.  The CAL [id number] study enrolled patients 79 years of age and older. The results showed 10% risk of local disease recurrence at 10 years in women treated with tamoxifen only ( no radiation therapy) in contrast to 2% risk of local disease recurrence at 10 years in women treated with tamoxifen and radiation therapy. However overall survival was the same for both groups. PRIME 2 study, confirmed a modest reduction in recurrence rate with RT that did not translate into a survival benefit. After a median follow-up of five years, ipsilateral breast tumor recurrences were lower in women assigned to RT (1.3 versus 4.1 percent). No differences in overall survival, regional recurrence, distant metastases, contralateral breast cancers, or new breast cancers were noted between the two groups. However the patient is very fit to 56-year-old, her predicted  life expectancy is above 10 years. Patient decided to proceed with postlumpectomy radiation treatment. She received 400,005 cGy in 15 fractions. She completed radiation treatment 2 weeks  2. Adjuvant hormonal therapy with Aromatase Inhibitor. Adjuvant endocrine therapy reduces breast cancer recurrence and breast cancer mortality in postmenopausal women. Side effects of arimidex explained: including but not limited to hot flashes, aches, osteoporosis, edema, vasodilation, rash, GI upset, mood changes, sob/cough, dyslipidemia, thrombophlebitis, anemia, leukopenia, thromboembolic disorder (rare, more so with tamoxifen), hypersensitivity, vaginal bleeding (rare, more so with tamoxifen), pain - pt understands and agrees to proceed. Diagnosis Orders   1. Malignant neoplasm of upper-outer quadrant of right breast in female, estrogen receptor positive ([facility] [address]     2. Status post right breast lumpectomy     3. Status post radiation therapy     4. Prophylactic use of anastrozole (Arimidex)          Plan:   Return in about 3 months (around 2/5/2021).      Orders Placed:   No orders of the defined types were placed in this encounter. Medications Prescribed:   Orders Placed This Encounter   Medications    anastrozole (ARIMIDEX) 1 MG tablet     Sig: Take 1 tablet by mouth daily     Dispense:  30 tablet     Refill:  3    anastrozole (ARIMIDEX) 1 MG tablet     Sig: Take 1 tablet by mouth daily     Dispense:  90 tablet     Refill:  3             I spent 60 minutes face-to-face with the patient and her family. Greater than 50% of time was spent on counseling and coordinating her care. Face to face counseling included prognosis, risks, benefits of treatment, compliance and risk reduction.        Electronically signed by Fanny Gallo MD on 9/11/20 at 2:00 PM EDT

## 2020-11-11 NOTE — PROGRESS NOTES
RADIATION ONCOLOGY [address]  [address] [place] [address], [address]  Ph. [phone]  Fax [phone]    PATIENT: Jorge Washburn  :   MRN: [id number]  DATE: 10/20/2020      DIAGNOSIS: Malignant neoplasm of upper-outer quadrant of right breast in female, estrogen receptor positive ([address]  Staging form: Breast, AJCC 8th Edition  - Clinical stage from 2020: Stage IA (cT1c, cN0, cM0, G2, ER+, NY+, HER2-) - Signed by Brett Rodgers MD on 2020  - Pathologic stage from 2020: No Stage Recommended (pT1c, cN0, cM0, G1, ER+, NY+, HER2-) - Signed by Brett Rodgers MD on 2020      Treatment Course Number: 1    Treatment Site (s) Modality Dose (cGy From To Fractions/  Elapsed Days   Right Breast Turkish/RPO 4005 09/30/20 10/20/20 15/20     Cumulative Dose: 4005 cGy  Treatment Modifiers: na      HISTORY OF PRESENT ILLNESS:   As per [facility] note on 2020, This is a 67-year-old female with newly diagnosed right breast cancer.  She presents to the medical oncology clinic to discuss postsurgical treatment.  The patient had annual mammogram in 2019. Antonia Coop showed possible irregular density with architectural distortion in the left breast.  Additional compression and lateromedial views were recommended as well as left breast ultrasound.  There was no sonographic correlate for the irregular left breast mammographic abnormality.  Stereotactic guided biopsy was recommended.  In addition ultrasound revealed an 0.8 cm x 0.7 cm oval lesion in the left breast upper outer aspect.  Differential diagnosis included complex cyst or a solid mass.  Diagnostic aspiration or possible core biopsy was recommended.  Left breast core needle biopsy on 2019 showed atypical ductal hyperplasia, atypical lobular hyperplasia.  On 2019 the patient underwent left breast lumpectomy that showed only focal atypical ductal hyperplasia, focal atypical lobular hyperplasia. On July 15, 2020 the patient underwent bilateral breast MRI, it showed to 1.9 cm x 1.1 cm irregular spiculated mass in the right breast at the 10 o'clock position.  There were no abnormal looking axillary lymph nodes.  Ultrasound of the right breast on 2020 confirmed the presence of irregular mass.  The same day the patient underwent ultrasound-guided biopsy which showed invasive ductal carcinoma, grade 2, estrogen receptor positive in 100% of cells, progesterone receptor positive and 60% of cells, HER-2 by IHC was equivocal 2+ but FISH was nonamplified  She  met with the surgeon Dr. Claudetta Grant to discuss further surgical management.  She decided to proceed with right lumpectomy.  Due to age of 69 and clinically negative lymph node sentinel lymph node biopsy was omitted.  Final pathology report showed: Genell Do, right, lumpectomy:    Invasive ductal carcinoma, [place] grade 1, stage pT1c NX.    Ductal carcinoma in situ, low nuclear grade, cribriform and   micropapillary types.     Cranial margin positive for invasive carcinoma.    Changes consistent with previous biopsy site.       Fibrocystic changes including usual ductal hyperplasia, microcyst       formation, dense fibrosis, and columnar cell change. BGenell Do, new lateral margin, excision:    Changes consistent with previous biopsy site.       Microcalcifications.    Negative for malignancy.      Oncotype DX Breast Cancer Assay (RT-PCR) performed by [employer]   Breast Cancer Recurrence Score:   12      Due to positive cranial margin on 2020 she underwent reexcision of the cranial margin, it was negative for malignancy. Patient recently seen by medical oncology for further discussion of systemic adjuvant therapy, no chemotherapy was recommended based upon her low Oncotype DX recurrence score, however AI therapy was recommended.     Pain Ratin/10  ECOG Performance Status: 0      Treatment Tolerance/Response:   Mrs. Venu Lopez tolerated adjuvant whole breast radiation well with only mild treatment related side effects, including mild erythema in the RT field as well as mild follicular rash in the RT field. No significant pain or swelling noted during the course of treatment. Skin effects were treated conservatively with topical creams. She completed treatment as prescribed with no significant treatment breaks. Systemic Therapy: None      Follow Up Plan:   Mrs. Venu Lopez will follow up in our department in approx. 1 month or sooner if clinically indicated. She has our clinic number to call with any questions or concerns if she were to have any prior to next visit. Thank you for allowing us to be a part of her care.       Electronically signed by Nolan Guardado MD on 11/11/2020 at 10:41 AM  Radiation Oncology      CC:  Dr. Fanny Gallo ([facility])  ACC: Tumor Registry: [address] [place] [address]

## 2020-11-30 ENCOUNTER — TELEPHONE (OUTPATIENT)
Dept: FAMILY MEDICINE CLINIC | Age: 70
End: 2020-11-30

## 2020-11-30 NOTE — TELEPHONE ENCOUNTER
Pt is scheduled to see Dr Sadie Rosado for routine f/u appt - Pt's next Prolia injection can be given at that appt - will verify SOB thru [address] prior to appt and scanned to [employer]

## 2020-12-09 ENCOUNTER — HOSPITAL ENCOUNTER (OUTPATIENT)
Dept: RADIATION ONCOLOGY | Age: 70
Discharge: HOME | End: 2020-12-09
Attending: RADIOLOGY
Payer: MEDICARE

## 2020-12-09 VITALS
WEIGHT: 153.2 LBS | OXYGEN SATURATION: 96 % | HEART RATE: 84 BPM | RESPIRATION RATE: 16 BRPM | BODY MASS INDEX: 26.3 KG/M2 | SYSTOLIC BLOOD PRESSURE: 141 MMHG | DIASTOLIC BLOOD PRESSURE: 74 MMHG | TEMPERATURE: 98.2 F

## 2020-12-09 PROCEDURE — 99214 OFFICE O/P EST MOD 30 MIN: CPT | Performed by: NURSE PRACTITIONER

## 2020-12-09 PROCEDURE — 99212 OFFICE O/P EST SF 10 MIN: CPT | Performed by: NURSE PRACTITIONER

## 2020-12-14 ENCOUNTER — TELEPHONE (OUTPATIENT)
Dept: FAMILY MEDICINE CLINIC | Age: 70
End: 2020-12-14

## 2021-01-05 ENCOUNTER — TELEPHONE (OUTPATIENT)
Dept: FAMILY MEDICINE CLINIC | Age: 71
End: 2021-01-05

## 2021-01-05 NOTE — TELEPHONE ENCOUNTER
Prolia SOB received from [facility] [id number] covers Medicare Part B deductible, coinsurance, and 100% of excess charges - Pt is scheduled to get Prolia 01/06/20 - SOB scanned to media

## 2021-01-06 ENCOUNTER — OFFICE VISIT (OUTPATIENT)
Dept: FAMILY MEDICINE CLINIC | Age: 71
End: 2021-01-06
Payer: MEDICARE

## 2021-01-06 VITALS
RESPIRATION RATE: 18 BRPM | BODY MASS INDEX: 26.61 KG/M2 | HEART RATE: 59 BPM | TEMPERATURE: 97.8 F | DIASTOLIC BLOOD PRESSURE: 70 MMHG | WEIGHT: 155 LBS | SYSTOLIC BLOOD PRESSURE: 120 MMHG

## 2021-01-06 DIAGNOSIS — C50.411 MALIGNANT NEOPLASM OF UPPER-OUTER QUADRANT OF RIGHT BREAST IN FEMALE, ESTROGEN RECEPTOR POSITIVE (HCC): ICD-10-CM

## 2021-01-06 DIAGNOSIS — M81.0 AGE-RELATED OSTEOPOROSIS WITHOUT CURRENT PATHOLOGICAL FRACTURE: ICD-10-CM

## 2021-01-06 DIAGNOSIS — Z17.0 MALIGNANT NEOPLASM OF UPPER-OUTER QUADRANT OF RIGHT BREAST IN FEMALE, ESTROGEN RECEPTOR POSITIVE (HCC): ICD-10-CM

## 2021-01-06 DIAGNOSIS — E03.9 ACQUIRED HYPOTHYROIDISM: ICD-10-CM

## 2021-01-06 DIAGNOSIS — E78.00 PURE HYPERCHOLESTEROLEMIA: Primary | ICD-10-CM

## 2021-01-06 PROCEDURE — 4040F PNEUMOC VAC/ADMIN/RCVD: CPT | Performed by: FAMILY MEDICINE

## 2021-01-06 PROCEDURE — G9899 SCRN MAM PERF RSLTS DOC: HCPCS | Performed by: FAMILY MEDICINE

## 2021-01-06 PROCEDURE — 99214 OFFICE O/P EST MOD 30 MIN: CPT | Performed by: FAMILY MEDICINE

## 2021-01-06 PROCEDURE — 96372 THER/PROPH/DIAG INJ SC/IM: CPT | Performed by: FAMILY MEDICINE

## 2021-01-06 PROCEDURE — 1123F ACP DISCUSS/DSCN MKR DOCD: CPT | Performed by: FAMILY MEDICINE

## 2021-01-06 PROCEDURE — 1090F PRES/ABSN URINE INCON ASSESS: CPT | Performed by: FAMILY MEDICINE

## 2021-01-06 PROCEDURE — 3017F COLORECTAL CA SCREEN DOC REV: CPT | Performed by: FAMILY MEDICINE

## 2021-01-06 PROCEDURE — G8484 FLU IMMUNIZE NO ADMIN: HCPCS | Performed by: FAMILY MEDICINE

## 2021-01-06 PROCEDURE — G8399 PT W/DXA RESULTS DOCUMENT: HCPCS | Performed by: FAMILY MEDICINE

## 2021-01-06 PROCEDURE — G8427 DOCREV CUR MEDS BY ELIG CLIN: HCPCS | Performed by: FAMILY MEDICINE

## 2021-01-06 PROCEDURE — 1036F TOBACCO NON-USER: CPT | Performed by: FAMILY MEDICINE

## 2021-01-06 PROCEDURE — G8417 CALC BMI ABV UP PARAM F/U: HCPCS | Performed by: FAMILY MEDICINE

## 2021-01-06 NOTE — PROGRESS NOTES
Administrations This Visit     denosumab (PROLIA) SC injection 60 mg     Admin Date  01/06/2021  10:29 Action  Given Dose  60 mg Route  Subcutaneous Site  Arm Left Administered By  Conner Johnson LPN    Ordering Provider: Dillon Escamilla MD    [address]: [id number]    Lot#: [id number]    : AMGEN    Patient Supplied?: No                Patient instructed to report any adverse reaction to me immediately.  ABN signed, pt tolerated well

## 2021-01-11 NOTE — PROGRESS NOTES
[address] [place] [id number]  Dept: [phone]  Dept Fax: [phone]  Loc: [phone]    Echo Patiño is a 69 y.o. female who presents today for:  Chief Complaint   Patient presents with    6 Month Follow-Up    Injections     prolia           HPI:     HPI    Well Adult Physical: Patient here for a comprehensive physical exam.The patient reports problems - cholesterol and thyroid issues are controlled on current medications. Dexa scan in 2015 shows osteoporosis. Do you take any herbs or supplements that were not prescribed by a doctor? no Are you taking calcium supplements? yes Are you taking aspirin daily? yes   History:  Any STD's in the past? none    In  she was diagnosed with a mass in her breast and had a core needle biopsy which was suspicoous and subsequent lumpectomy which showed no cancer. She has a strong family history of breast cancer so she had a breast MRI in July 2020. She was found to have an abnormal mass in the right breast and had a lumpectomy that showed an invasive ductal carcinoma. She is still seeing oncology and surgery for follow up. Reviewed chart forpast medical history , surgical history , allergies, social history , family history and medications.     Health Maintenance   Topic Date Due    Annual Wellness Visit (AWV)  05/29/2019    Lipid screen  07/01/2021    TSH testing  07/01/2021    Breast cancer screen  08/12/2021    DTaP/Tdap/Td vaccine (2 - Td) 08/14/2023    Colon cancer screen colonoscopy  01/29/2026    DEXA (modify frequency per FRAX score)  Completed    Flu vaccine  Completed    Shingles Vaccine  Completed    Pneumococcal 65+ years Vaccine  Completed    Hepatitis C screen  Completed    Hepatitis A vaccine  Aged Out    Hepatitis B vaccine  Aged Out    Hib vaccine  Aged Out    Meningococcal (ACWY) vaccine  Aged Out       Subjective: Constitutional:Negative for fever, chills, diaphoresis, activity change, appetite change and fatigue. HENT: Negative for hearing loss, ear pain, congestion, sore throat, rhinorrhea, postnasal drip and ear discharge. Eyes: Negative for photophobia and visual disturbance. Respiratory: Negative for cough, chest tightness, shortness of breath and wheezing. Cardiovascular: Negative for chest pain and leg swelling. Gastrointestinal: Negative for nausea, vomiting, abdominal pain, diarrhea and constipation. Genitourinary: Negative for dysuria, urgency and frequency. Neurological: Negative for weakness, light-headedness and headaches. Psychiatric/Behavioral: Negative for sleep disturbance.      :     Vitals:    01/06/21 0940   BP: 120/70   Site: Left Upper Arm   Position: Sitting   Cuff Size: Medium Adult   Pulse: 59   Resp: 18   Temp: 97.8 °F (36.6 °C)   TempSrc: Temporal   Weight: 155 lb (70.3 kg)     Wt Readings from Last 3 Encounters:   01/06/21 155 lb (70.3 kg)   12/09/20 153 lb 3.2 oz (69.5 kg)   11/05/20 151 lb 9.6 oz (68.8 kg)       Physical Exam  Constitutional: Vital signs are normal. She appears well-developed and well-nourished. She is active. HENT:   Head: Normocephalic and atraumatic. Right Ear: Tympanic membrane, external ear and ear canal normal. No drainage or tenderness. Left Ear: Tympanic membrane, external ear and ear canal normal. No drainage or tenderness. Nose: Nose normal. No mucosal edema or rhinorrhea. Mouth/Throat: Uvula is midline, oropharynx is clear and moist and mucous membranes are normal. Mucous membranes are not pale. Normal dentition. No posterior oropharyngeal edema or posterior oropharyngeal erythema. Eyes: Lids are normal. Right eye exhibits no chemosis and no discharge. Left eye exhibits no chemosis and no drainage. Right conjunctiva has no hemorrhage. Left conjunctiva has no hemorrhage. Right eye exhibits normal extraocular motion.  Left eye exhibits normal extraocular motion. Right pupil is round and reactive. Left pupil is round and reactive. Pupils are equal.   Cardiovascular: Normal rate, regular rhythm, S1 normal, S2 normal and normal heart sounds. Exam reveals no gallop. No murmur heard. Pulmonary/Chest: Effort normal and breath sounds normal. No respiratory distress. She has no wheezes. She has no rhonchi. She has no rales. Abdominal: Soft. Normal appearance and bowel sounds are normal. She exhibits no distension and no mass. There is no hepatosplenomegaly. No tenderness. She has no rigidity, no rebound and no guarding. No hernia. Musculoskeletal:        Right lower leg: She exhibits no edema. Left lower leg: She exhibits no edema. Neurological: She is alert. Assessment/Plan   Leonie Cain was seen today for 6 month follow-up and injections. Diagnoses and all orders for this visit:    Pure hypercholesterolemia  -     Comprehensive Metabolic Panel, Fasting; Future  -     Lipid Panel; Future    Acquired hypothyroidism  -     TSH With Reflex Ft4; Future    Age-related osteoporosis without current pathological fracture  -     denosumab (PROLIA) SC injection 60 mg  -     CBC; Future    Malignant neoplasm of upper-outer quadrant of right breast in female, estrogen receptor positive ([facility] [address]    No change to medications   Continue healthy diet and exercise  Aspirin daily    Discussed use, benefit, and side effectsof prescribed medications. All patient questions answered. Pt voiced understanding. Reviewed health maintenance. Instructed to continue current medications, diet and exercise. Patient agreed with treatment plan. Followup as directed.      Electronically signed by Daniel Garcia MD

## 2021-02-03 ENCOUNTER — OFFICE VISIT (OUTPATIENT)
Dept: ONCOLOGY | Age: 71
End: 2021-02-03
Payer: MEDICARE

## 2021-02-03 ENCOUNTER — HOSPITAL ENCOUNTER (OUTPATIENT)
Dept: INFUSION THERAPY | Age: 71
Discharge: HOME | End: 2021-02-03
Payer: MEDICARE

## 2021-02-03 VITALS
BODY MASS INDEX: 25.92 KG/M2 | SYSTOLIC BLOOD PRESSURE: 156 MMHG | TEMPERATURE: 98.2 F | HEART RATE: 77 BPM | HEIGHT: 65 IN | DIASTOLIC BLOOD PRESSURE: 77 MMHG | RESPIRATION RATE: 16 BRPM | WEIGHT: 155.6 LBS | OXYGEN SATURATION: 98 %

## 2021-02-03 DIAGNOSIS — Z08 ENCOUNTER FOR FOLLOW-UP SURVEILLANCE OF BREAST CANCER: ICD-10-CM

## 2021-02-03 DIAGNOSIS — Z79.811 PROPHYLACTIC USE OF ANASTROZOLE (ARIMIDEX): ICD-10-CM

## 2021-02-03 DIAGNOSIS — Z98.890 STATUS POST RIGHT BREAST LUMPECTOMY: ICD-10-CM

## 2021-02-03 DIAGNOSIS — C50.411 MALIGNANT NEOPLASM OF UPPER-OUTER QUADRANT OF RIGHT BREAST IN FEMALE, ESTROGEN RECEPTOR POSITIVE (HCC): Primary | ICD-10-CM

## 2021-02-03 DIAGNOSIS — Z92.3 STATUS POST RADIATION THERAPY: ICD-10-CM

## 2021-02-03 DIAGNOSIS — Z17.0 MALIGNANT NEOPLASM OF UPPER-OUTER QUADRANT OF RIGHT BREAST IN FEMALE, ESTROGEN RECEPTOR POSITIVE (HCC): Primary | ICD-10-CM

## 2021-02-03 DIAGNOSIS — Z85.3 ENCOUNTER FOR FOLLOW-UP SURVEILLANCE OF BREAST CANCER: ICD-10-CM

## 2021-02-03 PROCEDURE — 1090F PRES/ABSN URINE INCON ASSESS: CPT | Performed by: INTERNAL MEDICINE

## 2021-02-03 PROCEDURE — 3017F COLORECTAL CA SCREEN DOC REV: CPT | Performed by: INTERNAL MEDICINE

## 2021-02-03 PROCEDURE — 1123F ACP DISCUSS/DSCN MKR DOCD: CPT | Performed by: INTERNAL MEDICINE

## 2021-02-03 PROCEDURE — G8417 CALC BMI ABV UP PARAM F/U: HCPCS | Performed by: INTERNAL MEDICINE

## 2021-02-03 PROCEDURE — G9899 SCRN MAM PERF RSLTS DOC: HCPCS | Performed by: INTERNAL MEDICINE

## 2021-02-03 PROCEDURE — 99211 OFF/OP EST MAY X REQ PHY/QHP: CPT

## 2021-02-03 PROCEDURE — G8427 DOCREV CUR MEDS BY ELIG CLIN: HCPCS | Performed by: INTERNAL MEDICINE

## 2021-02-03 PROCEDURE — 99214 OFFICE O/P EST MOD 30 MIN: CPT | Performed by: INTERNAL MEDICINE

## 2021-02-03 PROCEDURE — G8484 FLU IMMUNIZE NO ADMIN: HCPCS | Performed by: INTERNAL MEDICINE

## 2021-02-03 PROCEDURE — G8399 PT W/DXA RESULTS DOCUMENT: HCPCS | Performed by: INTERNAL MEDICINE

## 2021-02-03 PROCEDURE — 4040F PNEUMOC VAC/ADMIN/RCVD: CPT | Performed by: INTERNAL MEDICINE

## 2021-02-03 PROCEDURE — 1036F TOBACCO NON-USER: CPT | Performed by: INTERNAL MEDICINE

## 2021-02-03 RX ORDER — ACETAMINOPHEN 160 MG
TABLET,DISINTEGRATING ORAL DAILY
COMMUNITY
End: 2022-09-22

## 2021-02-03 ASSESSMENT — ENCOUNTER SYMPTOMS
NAUSEA: 0
EYE DISCHARGE: 0
WHEEZING: 0
VOMITING: 0
ABDOMINAL PAIN: 0
COLOR CHANGE: 0
BLOOD IN STOOL: 0
FACIAL SWELLING: 0
RECTAL PAIN: 0
CONSTIPATION: 0
BACK PAIN: 0
SHORTNESS OF BREATH: 0
TROUBLE SWALLOWING: 0
CHEST TIGHTNESS: 0
ABDOMINAL DISTENTION: 0
COUGH: 0
SORE THROAT: 0
DIARRHEA: 0

## 2021-02-03 NOTE — PROGRESS NOTES
[facility] [address] [facility] [address], [address] [facility] [address]  Normar[address]eli Patel1  Dept: [phone]  Dept Fax: [phone]: [phone]    Visit Date:2/3/2021     Mo Alejandro is a 69 y.o. female who presents today for:   Chief Complaint   Patient presents with    Follow-up     breast CA        HPI:   This is a 76-year-old female with h/o right breast cancer. The patient had annual mammogram in October 2019. It showed possible irregular density with architectural distortion in the left breast.  Additional compression and lateromedial views were recommended as well as left breast ultrasound. There was no sonographic correlate for the irregular left breast mammographic abnormality. Stereotactic guided biopsy was recommended. In addition ultrasound revealed an 0.8 cm x 0.7 cm oval lesion in the left breast upper outer aspect. Differential diagnosis included complex cyst or a solid mass. Diagnostic aspiration or possible core biopsy was recommended. Left breast core needle biopsy on October 23, 2019 showed atypical ductal hyperplasia, atypical lobular hyperplasia. On November 7, 2019 the patient underwent left breast lumpectomy that showed only focal atypical ductal hyperplasia, focal atypical lobular hyperplasia. On July 15, 2020 the patient underwent bilateral breast MRI, it showed to 1.9 cm x 1.1 cm irregular spiculated mass in the right breast at the 10 o'clock position. There were no abnormal looking axillary lymph nodes. Ultrasound of the right breast on July 23, 2020 confirmed the presence of irregular mass. The same day the patient underwent ultrasound-guided biopsy which showed invasive ductal carcinoma, grade 2, estrogen receptor positive in 100% of cells, progesterone receptor positive and 60% of cells, HER-2 by IHC was equivocal 2+ but FISH was nonamplified  She  met with the surgeon Dr. Mira Rosales to discuss further surgical management.   She decided to proceed with right lumpectomy. Due to age of 69 and clinically negative lymph node sentinel lymph node biopsy was omitted. Final pathology report showed: Miller Arenas, right, lumpectomy:    Invasive ductal carcinoma, Larry grade 1, stage pT1c NX.    Ductal carcinoma in situ, low nuclear grade, cribriform and   micropapillary types.     Cranial margin positive for invasive carcinoma.    Changes consistent with previous biopsy site.       Fibrocystic changes including usual ductal hyperplasia, microcyst       formation, dense fibrosis, and columnar cell change. BAmador Dagoberto, new lateral margin, excision:    Changes consistent with previous biopsy site.       Microcalcifications.    Negative for malignancy. Oncotype DX Breast Cancer Assay (RT-PCR) performed by [facility]   Breast Cancer Recurrence Score:   12     Due to positive cranial margin on August 24, 2020 she underwent reexcision of the cranial margin, it was negative for malignancy. She has had genetic testing by [facility] Cancer Next -Expanded panel , no clinically significant mutations were detected. he patient has completed postlumpectomy radiation treatment. She received total cumulative dose of 4005 cGy in 15 fractions. She completed radiation treatment on October 28, 2020      Interim history on 02/03/2021:  The patient presents to the medical oncology clinic while receiving adjuvant hormonal therapy with Arimidex. She reports that she tolerates Arimidex well. Minimal arthralgia no hot flashes. The patient denies any complaints related to her breasts.   No hospitalizations since last visit with me    NIALL   Past Medical History:   Diagnosis Date    Acquired hypothyroidism 5/25/2017    Atypical lobular hyperplasia [facility]) of left breast 11/4/2019    Hyperlipidemia     Invasive ductal carcinoma of breast ([facility] [address] 07/2020    right    Osteopenia 5/25/2017    Osteoporosis 12/29/2016      Past Surgical History:   Procedure Laterality Date    APPENDECTOMY  1997    BREAST LUMPECTOMY Right 2020    RIGHT BREAST LUMPECTOMY WITH PRIOR NEEDLE LOC PLACEMENT and new lateral margin performed by Monet Dinero MD at [facility] LUMPECTOMY Right 2020    RE-EXCISION CARNIAL MARGIN RIGHT BREAST performed by Monet Dinero MD at [address] [place] [address] BREAST LUMPECTOMY Left 2020    BREAST SURGERY Left 2019    LEFT BREAST EXCISIONAL BIOPSY WITH NEEDLE LOCALIZATION PLACEMENT performed by Monet Dinero MD at [place]    ruptured ovarian cyst    SACHI US GUID NDL BIOPSY RIGHT  2020    SACHI [address] RIGHT 2020 Jeannie Albright MD [facility]    TONSILLECTOMY  child    US GUIDED NEEDLE LOC OF RIGHT BREAST  2020    US GUIDED NEEDLE LOC OF RIGHT BREAST 2020 [facility] rt & Lt      Family History   Problem Relation Age of Onset    Diabetes Mother     Heart Disease Mother         afib and pacer    High Blood Pressure Mother     Breast Cancer Mother 46    Breast Cancer Sister 62    Dementia Maternal Uncle         alzheimer's    Dementia Maternal Aunt         alzheimer's    Colon Cancer Niece 39    COPD Father     No Known Problems Brother     No Known Problems Brother     Ovarian Cancer Neg Hx       Social History     Tobacco Use    Smoking status: Former Smoker     Packs/day: 0.25     Years: 20.00     Pack years: 5.00     Quit date: 1986     Years since quittin.1    Smokeless tobacco: Never Used   Substance Use Topics    Alcohol use: No     Alcohol/week: 0.0 standard drinks      Current Outpatient Medications   Medication Sig Dispense Refill    Cholecalciferol (VITAMIN D3) 50 MCG (2000) CAPS Take by mouth daily      Ubiquinol (QUNOL COQ10/UBIQUINOL/EFRAIN) 100 MG CAPS Take by mouth daily      anastrozole (ARIMIDEX) 1 MG tablet Take 1 tablet by mouth daily 90 tablet 3    NONFORMULARY Take by mouth 2 times daily CALCIUM CALTRATE 600 TWICE A DAY      atorvastatin (LIPITOR) 20 MG tablet TAKE 1 TABLET EVERY DAY 90 tablet 3    aspirin EC 81 MG EC tablet Take 1 tablet by mouth daily 30 tablet 3     No current facility-administered medications for this visit. No Known Allergies   Health Maintenance   Topic Date Due    COVID-19 Vaccine (1 of 2) 04/16/1966    Annual Wellness Visit (AWV)  05/29/2019    Lipid screen  07/01/2021    TSH testing  07/01/2021    Breast cancer screen  08/12/2021    DTaP/Tdap/Td vaccine (2 - Td) 08/14/2023    Colon cancer screen colonoscopy  01/29/2026    DEXA (modify frequency per FRAX score)  Completed    Flu vaccine  Completed    Shingles Vaccine  Completed    Pneumococcal 65+ years Vaccine  Completed    Hepatitis C screen  Completed    Hepatitis A vaccine  Aged Out    Hepatitis B vaccine  Aged Out    Hib vaccine  Aged Out    Meningococcal (ACWY) vaccine  Aged Out        Subjective:   Review of Systems   Constitutional: Negative for activity change, appetite change, fatigue and fever. HENT: Negative for congestion, dental problem, facial swelling, hearing loss, mouth sores, nosebleeds, sore throat, tinnitus and trouble swallowing. Eyes: Negative for discharge and visual disturbance. Respiratory: Negative for cough, chest tightness, shortness of breath and wheezing. Cardiovascular: Negative for chest pain, palpitations and leg swelling. Gastrointestinal: Negative for abdominal distention, abdominal pain, blood in stool, constipation, diarrhea, nausea, rectal pain and vomiting. Endocrine: Negative for cold intolerance, polydipsia and polyuria. Genitourinary: Negative for decreased urine volume, difficulty urinating, dysuria, flank pain, hematuria and urgency. Musculoskeletal: Negative for arthralgias, back pain, gait problem, joint swelling, myalgias and neck stiffness. Skin: Negative for color change, rash and wound.    Neurological: Negative for dizziness, tremors, seizures, speech difficulty, weakness, light-headedness, numbness and headaches. Hematological: Negative for adenopathy. Does not bruise/bleed easily. Psychiatric/Behavioral: Negative for confusion and sleep disturbance. The patient is not nervous/anxious. Objective:   Physical Exam  Vitals signs reviewed. Constitutional:       General: She is not in acute distress. Appearance: She is well-developed. HENT:      Head: Normocephalic. Mouth/Throat:      Pharynx: No oropharyngeal exudate. Eyes:      General: No scleral icterus. Right eye: No discharge. Left eye: No discharge. Pupils: Pupils are equal, round, and reactive to light. Neck:      Musculoskeletal: Normal range of motion and neck supple. Thyroid: No thyromegaly. Vascular: No JVD. Trachea: No tracheal deviation. Cardiovascular:      Rate and Rhythm: Normal rate. Heart sounds: Normal heart sounds. No murmur. No friction rub. No gallop. Pulmonary:      Effort: Pulmonary effort is normal. No respiratory distress. Breath sounds: Normal breath sounds. No stridor. No wheezing or rales. Chest:      Chest wall: No tenderness. Breasts:         Right: Skin change (status post lumpectomy. Incision is nicely healed) present. Left: Skin change (incision post lumpectomy) present. Abdominal:      General: Bowel sounds are normal. There is no distension. Palpations: Abdomen is soft. There is no mass. Tenderness: There is no abdominal tenderness. There is no rebound. Musculoskeletal: Normal range of motion. Comments: Good range of motion in all four extremities. Lymphadenopathy:      Cervical: No cervical adenopathy. Skin:     General: Skin is warm. Findings: No erythema or rash. Neurological:      Mental Status: She is alert and oriented to person, place, and time. Cranial Nerves: No cranial nerve deficit. Motor: No abnormal muscle tone.       Deep Tendon Reflexes: Reflexes are normal and symmetric. Psychiatric:         Behavior: Behavior normal.         Thought Content: Thought content normal.         Judgment: Judgment normal.         BP (!) 156/77 (Site: Left Upper Arm, Position: Sitting, Cuff Size: Medium Adult)   Pulse 77   Temp 98.2 °F (36.8 °C) (Oral)   Resp 16   Ht 5' 5\" (1.651 m)   Wt 155 lb 9.6 oz (70.6 kg)   SpO2 98%   BMI 25.89 kg/m²      ECOG status is 0    Imaging studies and labs:   No results found. Lab Results   Component Value Date    WBC 6.9 07/01/2020    HGB 15.3 07/01/2020    HCT 50.2 (H) 07/01/2020    .4 (H) 07/01/2020     07/01/2020       Chemistry        Component Value Date/Time     07/01/2020 1403    K 4.5 07/01/2020 1403     07/01/2020 1403    CO2 26 07/01/2020 1403    BUN 13 07/01/2020 1403    CREATININE 0.7 07/01/2020 1403        Component Value Date/Time    CALCIUM 10.0 07/01/2020 1403    ALKPHOS 69 07/01/2020 1403    AST 19 07/01/2020 1403    ALT 13 07/01/2020 1403    BILITOT 0.5 07/01/2020 1403        Bone density in July 2020 showed:  IMPRESSION: OSTEOPENIA    Assessment/Plan:   1. Stage IA (cT1c, cN0, cM0, G2, ER+, SC+, HER2-) right breast cancer  S/p lumpectomy. She had Oncotype DX assay that showed recurrence score 12. RS 25 or below identifies women with low risk of disease recurrence for whom chemotherapy is unlikely to provide a benefit. The patient belongs  to that group. Next,  she proceeded with postlumpectomy radiation treatment. She received 4005 cGy in 15 fractions. She completed radiation treatment in October 2020.  2. Adjuvant hormonal therapy with Aromatase Inhibitor. Tolerates Arimidex very well. Minimal arthralgia. Denies having any vasomotor symptoms, no mood changes. Denies any concerns related to her breasts. There is no evidence of disease recurrence on today's physical examination. The patient denies any symptoms suspicious for metastatic disease.   Instructed to continue Arimidex. 3.. Cancer surveillance. Management of breast cancer survivors who have completed active treatment and have no evidence of disease are cancer surveillance, lifestyle modifications including pursuing healthy regular exercise program, minimizing alcohol intake, and refraining from smoking. Survivor follow-up will include updated history, regular physical examination. Radiologic imaging to screen for distant recurrence will be not performed unless the patient will develop new symptoms. Symptoms suspicious for recurrent /metastic disease include:  ?Constitutional symptoms - Anorexia, weight loss, malaise, fatigue, insomnia. ? Bone pain  ? Pulmonary symptoms - persistent cough or dyspnea (at rest or with exertion). ?Neurologic symptoms - Headache, nausea, vomiting, confusion, weakness, numbness or tingling. ?Gastrointestinal symptoms - Right upper quadrant pain, change in bowel habits, presence of bloody or tarry stools. 4. Osteopenia. The patient had bone density study that showed osteopenia. We discussed lifestyle measures include adequate calcium and vitamin D, exercise, smoking cessation, fall prevention, and avoidance of heavy alcohol use. In general, 1200 mg of elemental calcium daily, total diet plus supplement, and 800 international units of vitamin D daily are recommended       Diagnosis Orders   1. Malignant neoplasm of upper-outer quadrant of right breast in female, estrogen receptor positive ([facility] [address]     2. Status post right breast lumpectomy     3. Status post radiation therapy     4. Prophylactic use of anastrozole (Arimidex)     5. Encounter for follow-up surveillance of breast cancer          Plan:   Return in about 6 months (around 8/3/2021). Orders Placed:   No orders of the defined types were placed in this encounter. Medications Prescribed:   No orders of the defined types were placed in this encounter.             I spent 60 minutes face-to-face with the patient and her family. Greater than 50% of time was spent on counseling and coordinating her care. Face to face counseling included prognosis, risks, benefits of treatment, compliance and risk reduction.        Electronically signed by Feliz Bernal MD on 9/11/20 at 2:00 PM EDT

## 2021-06-09 DIAGNOSIS — E78.5 HYPERLIPIDEMIA: ICD-10-CM

## 2021-06-10 RX ORDER — ATORVASTATIN CALCIUM 20 MG/1
TABLET, FILM COATED ORAL
Qty: 90 TABLET | Refills: 3 | Status: SHIPPED | OUTPATIENT
Start: 2021-06-10 | End: 2022-03-03 | Stop reason: SDUPTHER

## 2021-06-15 ENCOUNTER — HOSPITAL ENCOUNTER (OUTPATIENT)
Dept: INFUSION THERAPY | Age: 71
Discharge: HOME | End: 2021-06-15
Payer: MEDICARE

## 2021-06-15 ENCOUNTER — OFFICE VISIT (OUTPATIENT)
Dept: ONCOLOGY | Age: 71
End: 2021-06-15
Payer: MEDICARE

## 2021-06-15 VITALS
WEIGHT: 153.3 LBS | BODY MASS INDEX: 25.54 KG/M2 | HEART RATE: 84 BPM | TEMPERATURE: 97.9 F | SYSTOLIC BLOOD PRESSURE: 144 MMHG | OXYGEN SATURATION: 98 % | RESPIRATION RATE: 18 BRPM | DIASTOLIC BLOOD PRESSURE: 64 MMHG | HEIGHT: 65 IN

## 2021-06-15 DIAGNOSIS — Z85.3 ENCOUNTER FOR FOLLOW-UP SURVEILLANCE OF BREAST CANCER: ICD-10-CM

## 2021-06-15 DIAGNOSIS — Z08 ENCOUNTER FOR FOLLOW-UP SURVEILLANCE OF BREAST CANCER: ICD-10-CM

## 2021-06-15 DIAGNOSIS — C50.411 MALIGNANT NEOPLASM OF UPPER-OUTER QUADRANT OF RIGHT BREAST IN FEMALE, ESTROGEN RECEPTOR POSITIVE (HCC): ICD-10-CM

## 2021-06-15 DIAGNOSIS — Z98.890 STATUS POST RIGHT BREAST LUMPECTOMY: ICD-10-CM

## 2021-06-15 DIAGNOSIS — Z79.811 PROPHYLACTIC USE OF ANASTROZOLE (ARIMIDEX): ICD-10-CM

## 2021-06-15 DIAGNOSIS — Z92.3 STATUS POST RADIATION THERAPY: ICD-10-CM

## 2021-06-15 DIAGNOSIS — Z12.31 SCREENING MAMMOGRAM, ENCOUNTER FOR: Primary | ICD-10-CM

## 2021-06-15 DIAGNOSIS — Z17.0 MALIGNANT NEOPLASM OF UPPER-OUTER QUADRANT OF RIGHT BREAST IN FEMALE, ESTROGEN RECEPTOR POSITIVE (HCC): ICD-10-CM

## 2021-06-15 PROCEDURE — 99215 OFFICE O/P EST HI 40 MIN: CPT | Performed by: PHYSICIAN ASSISTANT

## 2021-06-15 PROCEDURE — 1123F ACP DISCUSS/DSCN MKR DOCD: CPT | Performed by: PHYSICIAN ASSISTANT

## 2021-06-15 PROCEDURE — G8427 DOCREV CUR MEDS BY ELIG CLIN: HCPCS | Performed by: PHYSICIAN ASSISTANT

## 2021-06-15 PROCEDURE — G8417 CALC BMI ABV UP PARAM F/U: HCPCS | Performed by: PHYSICIAN ASSISTANT

## 2021-06-15 PROCEDURE — 1090F PRES/ABSN URINE INCON ASSESS: CPT | Performed by: PHYSICIAN ASSISTANT

## 2021-06-15 PROCEDURE — G9899 SCRN MAM PERF RSLTS DOC: HCPCS | Performed by: PHYSICIAN ASSISTANT

## 2021-06-15 PROCEDURE — 99211 OFF/OP EST MAY X REQ PHY/QHP: CPT

## 2021-06-15 PROCEDURE — G8399 PT W/DXA RESULTS DOCUMENT: HCPCS | Performed by: PHYSICIAN ASSISTANT

## 2021-06-15 PROCEDURE — 1036F TOBACCO NON-USER: CPT | Performed by: PHYSICIAN ASSISTANT

## 2021-06-15 PROCEDURE — 4040F PNEUMOC VAC/ADMIN/RCVD: CPT | Performed by: PHYSICIAN ASSISTANT

## 2021-06-15 PROCEDURE — 3017F COLORECTAL CA SCREEN DOC REV: CPT | Performed by: PHYSICIAN ASSISTANT

## 2021-06-15 NOTE — PROGRESS NOTES
[facility] [address] [facility] [address], [address] [place] [address],[address]  [address] [address]  Dept: [phone]  Dept Fax: [phone]: [phone]      Visit Date:6/15/2021     Judith Glaser is a 66 y.o. female who presents today for:   Survivorship Visit    HPI:   Judith Glaser is a 66 y.o. female with history of breast cancer. The patient has completed treatment at [place] [address]. Her cancer team includes:  General Surgery: Dr. Aguilera Stage Oncology: Dr. Raeann Tejeda Oncology: Dr. Beka Annty: n/a      Treatment history includes: The patient has a history of right breast cancer. The patient had annual mammogram in October 2019. It showed possible irregular density with architectural distortion in the left breast.  Additional compression and lateromedial views were recommended as well as left breast ultrasound. There was no sonographic correlate for the irregular left breast mammographic abnormality. Stereotactic guided biopsy was recommended. In addition ultrasound revealed an 0.8 cm x 0.7 cm oval lesion in the left breast upper outer aspect. Differential diagnosis included complex cyst or a solid mass. Diagnostic aspiration or possible core biopsy was recommended. Left breast core needle biopsy on October 23, 2019 showed atypical ductal hyperplasia, atypical lobular hyperplasia. On November 7, 2019 the patient underwent left breast lumpectomy that showed only focal atypical ductal hyperplasia, focal atypical lobular hyperplasia. On July 15, 2020 the patient underwent bilateral breast MRI, it showed to 1.9 cm x 1.1 cm irregular spiculated mass in the right breast at the 10 o'clock position. There were no abnormal looking axillary lymph nodes. Ultrasound of the right breast on July 23, 2020 confirmed the presence of irregular mass.   The same day the patient underwent ultrasound-guided biopsy which showed invasive ductal carcinoma, grade 2, estrogen receptor positive in 100% of cells, progesterone receptor positive and 60% of cells, HER-2 by IHC was equivocal 2+ but FISH was nonamplified. She underwent right lumpectomy per Dr. Rajendra Lyman. Due to age of 69 and clinically negative lymph node sentinel lymph node biopsy was omitted. Final pathology report showed:   Breast, right, lumpectomy:    Invasive ductal carcinoma, Larry grade 1, stage pT1c NX.    Ductal carcinoma in situ, low nuclear grade, cribriform and   micropapillary types.     Cranial margin positive for invasive carcinoma.    Changes consistent with previous biopsy site.       Fibrocystic changes including usual ductal hyperplasia, microcyst       formation, dense fibrosis, and columnar cell change. BHerbie Minors, new lateral margin, excision:    Changes consistent with previous biopsy site.       Microcalcifications.    Negative for malignancy.      Oncotype DX Breast Cancer Assay (RT-PCR) performed by [facility]   Breast Cancer Recurrence Score:   12      Due to positive cranial margin on August 24, 2020 she underwent reexcision of the cranial margin, it was negative for malignancy.   She has had genetic testing by [facility] Cancer Next -Expanded panel , no clinically significant mutations were detected. She completed postlumpectomy radiation treatment and received total cumulative dose of 4005 cGy in 15 fractions. She completed radiation treatment on October 28, 2020. After completion of radiation, the patient was started on adjuvant hormonal therapy with Arimidex.          Interval History 6/15/2021:  Today, the patient presents for Survivorship visit. Her treatment history is summarized above. She continues regular follow up with her cancer team.  The patient states she has been feeling well recently. She denies any concerns related to her breasts. She denies nipple inversion, nipple discharge or skin dimpling.   She denies unintentional weight loss, poor appetite, increased fatigue, early satiety, shortness of breath, dyspnea on exertion, or new bone pain. She denies any concerns related to lymphedema. Any side effects from AI: no. The patient denies hot flashes, night sweats, bone pain, weight changes, vaginal dryness or incontinence. Has any family history changed since last visit? no      PMH, SH, and FH:  I reviewed the patients medication list and allergy list as noted on the electronic medical record. The PMH, SH and FH were also reviewed as noted on the EMR. Review of Systems:   Review of Systems   Pertinent review of systems noted in HPI, all other ROS negative. Objective:   Physical Exam   BP (!) 144/64 (Site: Left Upper Arm, Position: Sitting, Cuff Size: Medium Adult)   Pulse 84   Temp 97.9 °F (36.6 °C) (Oral)   Resp 18   Ht 5' 5\" (1.651 m)   Wt 153 lb 4.8 oz (69.5 kg)   SpO2 98%   BMI 25.51 kg/m²    General appearance: No apparent distress, well developed, and cooperative. HEENT: Pupils equal, round, and reactive to light. Conjunctivae/corneas clear. Neck: Supple, with full range of motion. Trachea midline. Respiratory:  Normal respiratory effort. Clear to auscultation, bilaterally without Rales/Wheezes/Rhonchi. Cardiovascular: Regular rate and rhythm with normal S1/S2   Chest: S/P right breast lumpectomy with well healed surgical scar. No palpable abnormalities. S/P left breast lumpectomy with well healed scar. Abdomen: Soft, non-distended with active bowel sounds. Musculoskeletal: No clubbing, cyanosis or edema bilaterally. Skin: Skin color, texture, turgor normal.  No visible rashes or lesions. Neurologic:  Neurovascularly intact without any focal sensory/motor deficits. Psychiatric: Alert and oriented    Survivorship Recommendations: The patient has completed her breast cancer survivorship visit today. She was provided breast cancer survivorship care plan prepared by oncology nurse navigator and reviewed by myself.  Care plan along with note from today's visit will be given to the patient's PCP Bob Smith MD.   Concerns addressed at today's visit including: None. The patient voiced no concerns. 1. Screenings Recommended:    A. Annual Mammogram - Patient due for mammogram. Ordered and scheduled at today's visit. B. Regular Gynecologic Follow Up - S/P total hysterectomy in 1997. C. If on aromatase inhibitor:      - DEXA Scan every 2 years - completed 7/15/2020 showing osteopenia. Patient due July 2022. - Optimize bone health with calcium and vitamin D.   D. Colonoscopy Screening: Completed in January 2016, due in January 2026. Follows with Dr. Lucille Garcia in [facility]. E. Lung Cancer Screening: Completed on 7/13/2020. Due in July 2021. Follows with PCP. 2. Education Provided:   A. Patient given handouts on surveillance and survivorship care. The patient will continue survivor follow-up including history, regular physical examination 1-4 times per year as clinically appropriate, then annually. Meeting with Medical Oncology, Radiation Oncology and General Surgery as deemed appropriate. Radiologic imaging to screen for distant recurrence will be not performed unless the patient will develop new symptoms. B. Patient educated on potential symptoms of recurrence including:     ?Constitutional symptoms  Anorexia, weight loss, malaise, fatigue, insomnia. ? Bone pain  ? Pulmonary symptoms  persistent cough or dyspnea (at rest or with exertion). ?Neurologic symptoms  Headache, nausea, vomiting, confusion, weakness, numbness or tingling. ?Gastrointestinal symptoms  Right upper quadrant pain, change in bowel habits, presence of bloody or tarry stools. C. Dietary recommendations given and provided handouts today. As per NCCN, all survivors are encouraged to make informed choices about food intake, limit red meat to less than 18 oz per week, avoid processed meat. Limit refined sugars and processed foods.  Eat diet that is at least 50% plant-based. Minimize alcohol intake. Patient encouraged to maintain a healthy weight. D. Exercise/Activity recommendations including: exercise 30 minutes 3-5 times per week including low-resistance weight training at least 2-3 times per week   E. Smoking Cessation if indicated: Not indicated. F. Possible cardiac toxicity related to breast cancer treatment. Recommend following with PCP regularly to assess cardiovascular risk. BP monitoring/management. Cholesterol monitoring/management especially if on AI. Smoking cessation. Diet and weight management, DM management. Echo/EKG based on individual risk   G: Immunizations: as recommended per CDC    3. Services/Referrals Provided:    A. Financial Navigation - Declined. [facility]. C. Rehabilitation services including PT Referral, OT Referral, ST Referral as indicated. Not indicated. D. Psychological support including , pastoral care, support groups. E. Genetic Counselor Evaluation: Not indicated. F. Education Opportunities - Patient was provided nutritional handout for breast cancer survivors. G. Smoking Cessation - Not indicated. On this date 6/15/2021 I have spent 50 minutes reviewing previous notes, test results and face to face with the patient discussing the diagnosis and importance of compliance with the treatment plan as well as documenting on the day of the visit.     Electronically signed by   Medardo Haines PA-C

## 2021-06-15 NOTE — PATIENT INSTRUCTIONS
1. Will obtain mammogram - next couple of weeks. 2. Move appointment with Dr. Marcial Phalen from August 2021 to November 2021. 3. Please call for questions or concerns.

## 2021-06-22 ENCOUNTER — HOSPITAL ENCOUNTER (OUTPATIENT)
Dept: WOMENS IMAGING | Age: 71
Discharge: HOME | End: 2021-06-22
Payer: MEDICARE

## 2021-06-22 DIAGNOSIS — Z12.31 SCREENING MAMMOGRAM, ENCOUNTER FOR: ICD-10-CM

## 2021-06-22 PROCEDURE — 77063 BREAST TOMOSYNTHESIS BI: CPT

## 2021-07-02 ENCOUNTER — HOSPITAL ENCOUNTER (OUTPATIENT)
Age: 71
Discharge: HOME | End: 2021-07-02
Payer: MEDICARE

## 2021-07-02 DIAGNOSIS — E03.9 ACQUIRED HYPOTHYROIDISM: ICD-10-CM

## 2021-07-02 DIAGNOSIS — E78.00 PURE HYPERCHOLESTEROLEMIA: ICD-10-CM

## 2021-07-02 DIAGNOSIS — M81.0 AGE-RELATED OSTEOPOROSIS WITHOUT CURRENT PATHOLOGICAL FRACTURE: ICD-10-CM

## 2021-07-02 LAB
ALBUMIN SERPL-MCNC: 4.1 G/DL (ref 3.5–5.1)
ALP BLD-CCNC: 64 U/L (ref 38–126)
ALT SERPL-CCNC: 13 U/L (ref 11–66)
ANION GAP SERPL CALCULATED.3IONS-SCNC: 8 MEQ/L (ref 8–16)
AST SERPL-CCNC: 18 U/L (ref 5–40)
BILIRUB SERPL-MCNC: 0.4 MG/DL (ref 0.3–1.2)
BUN BLDV-MCNC: 14 MG/DL (ref 7–22)
CALCIUM SERPL-MCNC: 9.6 MG/DL (ref 8.5–10.5)
CHLORIDE BLD-SCNC: 102 MEQ/L (ref 98–111)
CHOLESTEROL, TOTAL: 139 MG/DL (ref 100–199)
CO2: 28 MEQ/L (ref 23–33)
CREAT SERPL-MCNC: 0.7 MG/DL (ref 0.4–1.2)
ERYTHROCYTE [DISTWIDTH] IN BLOOD BY AUTOMATED COUNT: 12.2 % (ref 11.5–14.5)
ERYTHROCYTE [DISTWIDTH] IN BLOOD BY AUTOMATED COUNT: 41.6 FL (ref 35–45)
GFR SERPL CREATININE-BSD FRML MDRD: 82 ML/MIN/1.73M2
GLUCOSE FASTING: 98 MG/DL (ref 70–108)
HCT VFR BLD CALC: 45.8 % (ref 37–47)
HDLC SERPL-MCNC: 62 MG/DL
HEMOGLOBIN: 14.8 GM/DL (ref 12–16)
LDL CHOLESTEROL CALCULATED: 65 MG/DL
MCH RBC QN AUTO: 30 PG (ref 26–33)
MCHC RBC AUTO-ENTMCNC: 32.3 GM/DL (ref 32.2–35.5)
MCV RBC AUTO: 92.7 FL (ref 81–99)
PLATELET # BLD: 235 THOU/MM3 (ref 130–400)
PMV BLD AUTO: 10.6 FL (ref 9.4–12.4)
POTASSIUM SERPL-SCNC: 4 MEQ/L (ref 3.5–5.2)
RBC # BLD: 4.94 MILL/MM3 (ref 4.2–5.4)
SODIUM BLD-SCNC: 138 MEQ/L (ref 135–145)
TOTAL PROTEIN: 7.2 G/DL (ref 6.1–8)
TRIGL SERPL-MCNC: 62 MG/DL (ref 0–199)
TSH SERPL DL<=0.05 MIU/L-ACNC: 0.59 UIU/ML (ref 0.4–4.2)
WBC # BLD: 5.1 THOU/MM3 (ref 4.8–10.8)

## 2021-07-02 PROCEDURE — 84443 ASSAY THYROID STIM HORMONE: CPT

## 2021-07-02 PROCEDURE — 80061 LIPID PANEL: CPT

## 2021-07-02 PROCEDURE — 85027 COMPLETE CBC AUTOMATED: CPT

## 2021-07-02 PROCEDURE — 36415 COLL VENOUS BLD VENIPUNCTURE: CPT

## 2021-07-02 PROCEDURE — 80053 COMPREHEN METABOLIC PANEL: CPT

## 2021-07-06 NOTE — TELEPHONE ENCOUNTER
Prolia SOB received from [employer] covers the Medicare Part B deductible, co-insurance and 100% of excess charges    Pt contacted and informed that her next Prolia injection will be given 07/07/2021, at her 6 month f/u appt with Dr Dion Dodson - understanding voiced.

## 2021-07-07 ENCOUNTER — OFFICE VISIT (OUTPATIENT)
Dept: FAMILY MEDICINE CLINIC | Age: 71
End: 2021-07-07
Payer: MEDICARE

## 2021-07-07 VITALS
TEMPERATURE: 96.4 F | HEIGHT: 65 IN | RESPIRATION RATE: 16 BRPM | BODY MASS INDEX: 25.79 KG/M2 | OXYGEN SATURATION: 98 % | WEIGHT: 154.8 LBS | DIASTOLIC BLOOD PRESSURE: 78 MMHG | HEART RATE: 74 BPM | SYSTOLIC BLOOD PRESSURE: 136 MMHG

## 2021-07-07 DIAGNOSIS — Z79.811 USE OF ANASTROZOLE (ARIMIDEX): ICD-10-CM

## 2021-07-07 DIAGNOSIS — C50.411 MALIGNANT NEOPLASM OF UPPER-OUTER QUADRANT OF RIGHT BREAST IN FEMALE, ESTROGEN RECEPTOR POSITIVE (HCC): ICD-10-CM

## 2021-07-07 DIAGNOSIS — Z17.0 MALIGNANT NEOPLASM OF UPPER-OUTER QUADRANT OF RIGHT BREAST IN FEMALE, ESTROGEN RECEPTOR POSITIVE (HCC): ICD-10-CM

## 2021-07-07 DIAGNOSIS — E03.9 ACQUIRED HYPOTHYROIDISM: ICD-10-CM

## 2021-07-07 DIAGNOSIS — Z78.0 ASYMPTOMATIC POSTMENOPAUSAL STATE: ICD-10-CM

## 2021-07-07 DIAGNOSIS — E78.00 PURE HYPERCHOLESTEROLEMIA: Primary | ICD-10-CM

## 2021-07-07 DIAGNOSIS — Z87.891 HISTORY OF TOBACCO USE: ICD-10-CM

## 2021-07-07 DIAGNOSIS — M81.0 AGE-RELATED OSTEOPOROSIS WITHOUT CURRENT PATHOLOGICAL FRACTURE: ICD-10-CM

## 2021-07-07 DIAGNOSIS — B35.1 ONYCHOMYCOSIS OF TOENAIL: ICD-10-CM

## 2021-07-07 PROCEDURE — G9899 SCRN MAM PERF RSLTS DOC: HCPCS | Performed by: FAMILY MEDICINE

## 2021-07-07 PROCEDURE — 99214 OFFICE O/P EST MOD 30 MIN: CPT | Performed by: FAMILY MEDICINE

## 2021-07-07 PROCEDURE — 4040F PNEUMOC VAC/ADMIN/RCVD: CPT | Performed by: FAMILY MEDICINE

## 2021-07-07 PROCEDURE — 96372 THER/PROPH/DIAG INJ SC/IM: CPT | Performed by: FAMILY MEDICINE

## 2021-07-07 PROCEDURE — G8427 DOCREV CUR MEDS BY ELIG CLIN: HCPCS | Performed by: FAMILY MEDICINE

## 2021-07-07 PROCEDURE — G8399 PT W/DXA RESULTS DOCUMENT: HCPCS | Performed by: FAMILY MEDICINE

## 2021-07-07 PROCEDURE — 1090F PRES/ABSN URINE INCON ASSESS: CPT | Performed by: FAMILY MEDICINE

## 2021-07-07 PROCEDURE — 3017F COLORECTAL CA SCREEN DOC REV: CPT | Performed by: FAMILY MEDICINE

## 2021-07-07 PROCEDURE — 1123F ACP DISCUSS/DSCN MKR DOCD: CPT | Performed by: FAMILY MEDICINE

## 2021-07-07 PROCEDURE — 1036F TOBACCO NON-USER: CPT | Performed by: FAMILY MEDICINE

## 2021-07-07 PROCEDURE — G8417 CALC BMI ABV UP PARAM F/U: HCPCS | Performed by: FAMILY MEDICINE

## 2021-07-07 RX ORDER — TERBINAFINE HYDROCHLORIDE 250 MG/1
250 TABLET ORAL DAILY
Qty: 90 TABLET | Refills: 1 | Status: SHIPPED | OUTPATIENT
Start: 2021-07-07 | End: 2021-11-16

## 2021-07-07 NOTE — PROGRESS NOTES
Administrations This Visit     denosumab (PROLIA) SC injection 60 mg     Admin Date  07/07/2021  10:38 Action  Given Dose  60 mg Route  Subcutaneous Site  Arm Left Administered By  Mars Brown MA    Ordering Provider: Kavita Corea MD    [address]: [id number]    Lot#: [id number]    : [employer]    Patient Supplied?: No                Patient instructed to remain in clinic for 20 minutes after injection and was advised to report any adverse reaction to me immediately. Pt tolerated well, no reactions noted.

## 2021-07-07 NOTE — PROGRESS NOTES
[address] [place] [id number]  Dept: [phone]  Dept Fax: [phone]  Loc: [phone]    Chepe Perez is a 66 y.o. female who presents today for:  Chief Complaint   Patient presents with    6 Month Follow-Up           HPI:     HPI    Well Adult Physical: Patient here for a comprehensive physical exam.The patient reports problems - cholesterol and thyroid issues are controlled on current medications. Dexa scan in 2015 shows osteoporosis. Do you take any herbs or supplements that were not prescribed by a doctor? no Are you taking calcium supplements? yes Are you taking aspirin daily? yes   History:  Any STD's in the past? none    In  she was diagnosed with a mass in her breast and had a core needle biopsy which was suspicious and subsequent lumpectomy which showed no cancer. She has a strong family history of breast cancer so she had a breast MRI in July 2020. She was found to have an abnormal mass in the right breast and had a lumpectomy that showed an invasive ductal carcinoma. She is still seeing oncology and surgery for follow up. Reviewed chart forpast medical history , surgical history , allergies, social history , family history and medications.     Health Maintenance   Topic Date Due    Annual Wellness Visit (AWV)  Never done    Flu vaccine (1) 09/01/2021    Breast cancer screen  06/22/2022    Lipid screen  07/02/2022    TSH testing  07/02/2022    DTaP/Tdap/Td vaccine (2 - Td or Tdap) 08/14/2023    Colon cancer screen colonoscopy  01/29/2026    DEXA (modify frequency per FRAX score)  Completed    Shingles Vaccine  Completed    Pneumococcal 65+ years Vaccine  Completed    COVID-19 Vaccine  Completed    Hepatitis C screen  Completed    Hepatitis A vaccine  Aged Out    Hepatitis B vaccine  Aged Out    Hib vaccine  Aged Out    Meningococcal (ACWY) vaccine  Aged Out Subjective:      Constitutional:Negative for fever, chills, diaphoresis, activity change, appetite change and fatigue. HENT: Negative for hearing loss, ear pain, congestion, sore throat, rhinorrhea, postnasal drip and ear discharge. Eyes: Negative for photophobia and visual disturbance. Respiratory: Negative for cough, chest tightness, shortness of breath and wheezing. Cardiovascular: Negative for chest pain and leg swelling. Gastrointestinal: Negative for nausea, vomiting, abdominal pain, diarrhea and constipation. Genitourinary: Negative for dysuria, urgency and frequency. Neurological: Negative for weakness, light-headedness and headaches. Psychiatric/Behavioral: Negative for sleep disturbance.      :     Vitals:    07/07/21 0924   BP: 136/78   Site: Left Upper Arm   Position: Sitting   Cuff Size: Medium Adult   Pulse: 74   Resp: 16   Temp: 96.4 °F (35.8 °C)   TempSrc: Temporal   SpO2: 98%   Weight: 154 lb 12.8 oz (70.2 kg)   Height: 5' 5\" (1.651 m)     Wt Readings from Last 3 Encounters:   07/07/21 154 lb 12.8 oz (70.2 kg)   06/15/21 153 lb 4.8 oz (69.5 kg)   02/03/21 155 lb 9.6 oz (70.6 kg)       Physical Exam  Physical Exam   Constitutional: Vital signs are normal. She appears well-developed and well-nourished. She is active. HENT:   Head: Normocephalic and atraumatic. Right Ear: Tympanic membrane, external ear and ear canal normal. No drainage or tenderness. Left Ear: Tympanic membrane, external ear and ear canal normal. No drainage or tenderness. Nose: Nose normal. No mucosal edema or rhinorrhea. Mouth/Throat: Uvula is midline, oropharynx is clear and moist and mucous membranes are normal. Mucous membranes are not pale. Normal dentition. No posterior oropharyngeal edema or posterior oropharyngeal erythema. Eyes: Lids are normal. Right eye exhibits no chemosis and no discharge. Left eye exhibits no chemosis and no drainage. Right conjunctiva has no hemorrhage.  Left conjunctiva has no hemorrhage. Right eye exhibits normal extraocular motion. Left eye exhibits normal extraocular motion. Right pupil is round and reactive. Left pupil is round and reactive. Pupils are equal.   Cardiovascular: Normal rate, regular rhythm, S1 normal, S2 normal and normal heart sounds. Exam reveals no gallop. No murmur heard. Pulmonary/Chest: Effort normal and breath sounds normal. No respiratory distress. She has no wheezes. She has no rhonchi. She has no rales. Abdominal: Soft. Normal appearance and bowel sounds are normal. She exhibits no distension and no mass. There is no hepatosplenomegaly. No tenderness. She has no rigidity, no rebound and no guarding. No hernia. Musculoskeletal:        Right lower leg: She exhibits no edema. Left lower leg: She exhibits no edema. Neurological: She is alert. Skin:  Thick yellow flaking nails on the toes        Assessment/Plan   Stephanie Nettles was seen today for 6 month follow-up. Diagnoses and all orders for this visit:    Pure hypercholesterolemia    Acquired hypothyroidism    Age-related osteoporosis without current pathological fracture  -     denosumab (PROLIA) SC injection 60 mg    Malignant neoplasm of upper-outer quadrant of right breast in female, estrogen receptor positive ([facility] [address]    History of tobacco use    Asymptomatic postmenopausal state  -     MAMMO DEXA BONE DENSITY SCAN; Future    Use of anastrozole (Arimidex)  -     MAMMO DEXA BONE DENSITY SCAN; Future    Onychomycosis of toenail  -     terbinafine (LAMISIL) 250 MG tablet; Take 1 tablet by mouth daily  -     Hepatic Function Panel; Future    No change to medications   Continue healthy diet and exercise  Aspirin daily    Discussed use, benefit, and side effectsof prescribed medications. All patient questions answered. Pt voiced understanding. Reviewed health maintenance. Instructed to continue current medications, diet and exercise. Patient agreed with treatment plan. Followup as directed.      Electronically signed by Francisco Berumen MD

## 2021-07-12 ENCOUNTER — TELEPHONE (OUTPATIENT)
Dept: ONCOLOGY | Age: 71
End: 2021-07-12

## 2021-07-12 NOTE — TELEPHONE ENCOUNTER
Patient called asking if she can start taking Terbinafine 250mg, she is asking if it is okay to take while taking Arimidex? Please advise, thank you.

## 2021-08-05 ENCOUNTER — TELEPHONE (OUTPATIENT)
Dept: FAMILY MEDICINE CLINIC | Age: 71
End: 2021-08-05

## 2021-08-05 NOTE — TELEPHONE ENCOUNTER
----- Message from Belkis Jenkins sent at 8/5/2021  4:32 PM EDT -----  Subject: Message to Provider    QUESTIONS  Information for Provider? Patient called in and said she was to have a   bone scan on Monday and received a call from [facility], she   was told insurance will only cover every other year unless an exception. Patient wants to know if this is considered an exception or does it have   to be done. She would like a call back to discuss this.   ---------------------------------------------------------------------------  --------------  [address]  What is the best way for the office to contact you? OK to leave message on   voicemail  Preferred Call Back Phone Number? [phone]  ---------------------------------------------------------------------------  --------------  SCRIPT ANSWERS  Relationship to Patient?  Self

## 2021-08-18 ENCOUNTER — HOSPITAL ENCOUNTER (OUTPATIENT)
Age: 71
Discharge: HOME | End: 2021-08-18
Payer: MEDICARE

## 2021-08-18 DIAGNOSIS — B35.1 ONYCHOMYCOSIS OF TOENAIL: ICD-10-CM

## 2021-08-18 PROCEDURE — 36415 COLL VENOUS BLD VENIPUNCTURE: CPT

## 2021-08-18 PROCEDURE — 80076 HEPATIC FUNCTION PANEL: CPT

## 2021-08-19 LAB
ALBUMIN SERPL-MCNC: 4.2 G/DL (ref 3.5–5.1)
ALP BLD-CCNC: 75 U/L (ref 38–126)
ALT SERPL-CCNC: 12 U/L (ref 11–66)
AST SERPL-CCNC: 16 U/L (ref 5–40)
BILIRUB SERPL-MCNC: 0.3 MG/DL (ref 0.3–1.2)
BILIRUBIN DIRECT: < 0.2 MG/DL (ref 0–0.3)
TOTAL PROTEIN: 6.8 G/DL (ref 6.1–8)

## 2021-09-03 RX ORDER — ANASTROZOLE 1 MG/1
TABLET ORAL
Qty: 90 TABLET | Refills: 3 | Status: SHIPPED | OUTPATIENT
Start: 2021-09-03 | End: 2022-03-09 | Stop reason: SDUPTHER

## 2021-10-14 ENCOUNTER — TELEPHONE (OUTPATIENT)
Dept: FAMILY MEDICINE CLINIC | Age: 71
End: 2021-10-14

## 2021-10-14 NOTE — TELEPHONE ENCOUNTER
----- Message from Beatriz Fleming sent at 10/14/2021  4:22 PM EDT -----  Subject: Medication Problem    QUESTIONS  Name of Medication? terbinafine (LAMISIL) 250 MG tablet  Patient-reported dosage and instructions? once daily  What question or problem do you have with the medication? having a   metallic taste to food, salt and sugar tastes have also been affected  [facility] [phone]  Pharmacy phone number (if available)? [phone]  Additional Information for Provider? Wondering if she needs to take meds   at all\" meds worse than cure? \" or is there something else she can take. Has been researching med and wants to know if any effect on liver Please   call her at [phone]  ---------------------------------------------------------------------------  --------------  [address]  What is the best way for the office to contact you? OK to leave message on   voicemail, OK to respond with electronic message via [employer] portal (only   for patients who have registered [employer] account)  Preferred Call Back Phone Number? [phone]  ---------------------------------------------------------------------------  --------------  SCRIPT ANSWERS  Relationship to Patient?  Self

## 2021-10-14 NOTE — TELEPHONE ENCOUNTER
Pt thinks that lamisil is giving her these problems listed below. Causing a Metallic taste in her mouth. States she can taste it right when she puts it on her tongue. She is also worried about it causing problems with her liver too. She voiced her toenails are looking better. Do you recommend she stops the medication?

## 2021-10-14 NOTE — TELEPHONE ENCOUNTER
The lamisil can cause a metallic taste that goes away when she stops the medication    The liver function was rechecked in August after she started the lamisil and it looked good    Does she think the nails have grown at least correction out ? There are topicals but they do not work nearly as well.     Call patient

## 2021-10-15 NOTE — TELEPHONE ENCOUNTER
Patient aware and voiced understanding, no concerns voiced at this time. Pt stated her nails have grown at least assisted out. Pt stated she will continue taking the medication.

## 2021-11-16 ENCOUNTER — OFFICE VISIT (OUTPATIENT)
Dept: ONCOLOGY | Age: 71
End: 2021-11-16
Payer: MEDICARE

## 2021-11-16 ENCOUNTER — HOSPITAL ENCOUNTER (OUTPATIENT)
Dept: INFUSION THERAPY | Age: 71
Discharge: HOME | End: 2021-11-16
Payer: MEDICARE

## 2021-11-16 VITALS
HEIGHT: 64 IN | WEIGHT: 152.2 LBS | TEMPERATURE: 99 F | RESPIRATION RATE: 16 BRPM | OXYGEN SATURATION: 97 % | SYSTOLIC BLOOD PRESSURE: 149 MMHG | DIASTOLIC BLOOD PRESSURE: 70 MMHG | HEART RATE: 89 BPM | BODY MASS INDEX: 25.99 KG/M2

## 2021-11-16 DIAGNOSIS — Z79.811 PROPHYLACTIC USE OF ANASTROZOLE (ARIMIDEX): ICD-10-CM

## 2021-11-16 DIAGNOSIS — C50.411 MALIGNANT NEOPLASM OF UPPER-OUTER QUADRANT OF RIGHT BREAST IN FEMALE, ESTROGEN RECEPTOR POSITIVE (HCC): Primary | ICD-10-CM

## 2021-11-16 DIAGNOSIS — Z98.890 STATUS POST RIGHT BREAST LUMPECTOMY: ICD-10-CM

## 2021-11-16 DIAGNOSIS — Z85.3 ENCOUNTER FOR FOLLOW-UP SURVEILLANCE OF BREAST CANCER: ICD-10-CM

## 2021-11-16 DIAGNOSIS — Z17.0 MALIGNANT NEOPLASM OF UPPER-OUTER QUADRANT OF RIGHT BREAST IN FEMALE, ESTROGEN RECEPTOR POSITIVE (HCC): Primary | ICD-10-CM

## 2021-11-16 DIAGNOSIS — Z08 ENCOUNTER FOR FOLLOW-UP SURVEILLANCE OF BREAST CANCER: ICD-10-CM

## 2021-11-16 PROCEDURE — 1090F PRES/ABSN URINE INCON ASSESS: CPT | Performed by: INTERNAL MEDICINE

## 2021-11-16 PROCEDURE — G8399 PT W/DXA RESULTS DOCUMENT: HCPCS | Performed by: INTERNAL MEDICINE

## 2021-11-16 PROCEDURE — 1036F TOBACCO NON-USER: CPT | Performed by: INTERNAL MEDICINE

## 2021-11-16 PROCEDURE — 4040F PNEUMOC VAC/ADMIN/RCVD: CPT | Performed by: INTERNAL MEDICINE

## 2021-11-16 PROCEDURE — G9899 SCRN MAM PERF RSLTS DOC: HCPCS | Performed by: INTERNAL MEDICINE

## 2021-11-16 PROCEDURE — G8484 FLU IMMUNIZE NO ADMIN: HCPCS | Performed by: INTERNAL MEDICINE

## 2021-11-16 PROCEDURE — G8427 DOCREV CUR MEDS BY ELIG CLIN: HCPCS | Performed by: INTERNAL MEDICINE

## 2021-11-16 PROCEDURE — 3017F COLORECTAL CA SCREEN DOC REV: CPT | Performed by: INTERNAL MEDICINE

## 2021-11-16 PROCEDURE — 1123F ACP DISCUSS/DSCN MKR DOCD: CPT | Performed by: INTERNAL MEDICINE

## 2021-11-16 PROCEDURE — G8417 CALC BMI ABV UP PARAM F/U: HCPCS | Performed by: INTERNAL MEDICINE

## 2021-11-16 PROCEDURE — 99211 OFF/OP EST MAY X REQ PHY/QHP: CPT

## 2021-11-16 PROCEDURE — 99214 OFFICE O/P EST MOD 30 MIN: CPT | Performed by: INTERNAL MEDICINE

## 2021-11-16 ASSESSMENT — ENCOUNTER SYMPTOMS
CONSTIPATION: 0
RECTAL PAIN: 0
TROUBLE SWALLOWING: 0
EYE DISCHARGE: 0
SHORTNESS OF BREATH: 0
COUGH: 0
FACIAL SWELLING: 0
ABDOMINAL PAIN: 0
WHEEZING: 0
COLOR CHANGE: 0
ABDOMINAL DISTENTION: 0
DIARRHEA: 0
CHEST TIGHTNESS: 0
BLOOD IN STOOL: 0
VOMITING: 0
NAUSEA: 0
BACK PAIN: 0
SORE THROAT: 0

## 2021-12-01 DIAGNOSIS — B35.1 ONYCHOMYCOSIS OF TOENAIL: ICD-10-CM

## 2021-12-06 RX ORDER — TERBINAFINE HYDROCHLORIDE 250 MG/1
250 TABLET ORAL DAILY
Qty: 90 TABLET | Refills: 1 | Status: SHIPPED | OUTPATIENT
Start: 2021-12-06 | End: 2022-06-04

## 2021-12-21 ENCOUNTER — TELEPHONE (OUTPATIENT)
Dept: FAMILY MEDICINE CLINIC | Age: 71
End: 2021-12-21

## 2021-12-23 ENCOUNTER — TELEPHONE (OUTPATIENT)
Dept: FAMILY MEDICINE CLINIC | Age: 71
End: 2021-12-23

## 2022-01-10 ENCOUNTER — NURSE ONLY (OUTPATIENT)
Dept: FAMILY MEDICINE CLINIC | Age: 72
End: 2022-01-10
Payer: MEDICARE

## 2022-01-10 DIAGNOSIS — M81.0 AGE-RELATED OSTEOPOROSIS WITHOUT CURRENT PATHOLOGICAL FRACTURE: Primary | ICD-10-CM

## 2022-01-10 DIAGNOSIS — Z78.0 ASYMPTOMATIC POSTMENOPAUSAL STATE: ICD-10-CM

## 2022-01-10 PROCEDURE — 96372 THER/PROPH/DIAG INJ SC/IM: CPT | Performed by: FAMILY MEDICINE

## 2022-01-10 NOTE — PROGRESS NOTES
Administrations This Visit     denosumab (PROLIA) SC injection 60 mg     Admin Date  01/10/2022  09:02 Action  Given Dose  60 mg Route  SubCUTAneous Site  Arm Left Administered By  Mat Valdivia LPN    Ordering Provider: Leonor Mcfarlane MD    NDC: [id number]    Lot#: [id number]    : [employer]    Patient Supplied?: No                Patient instructed to remain in clinic for 20 minutes after injection and was advised to report any adverse reaction to me immediately.

## 2022-03-03 DIAGNOSIS — E78.5 HYPERLIPIDEMIA: ICD-10-CM

## 2022-03-03 RX ORDER — ATORVASTATIN CALCIUM 20 MG/1
TABLET, FILM COATED ORAL
Qty: 90 TABLET | Refills: 3 | Status: SHIPPED | OUTPATIENT
Start: 2022-03-03

## 2022-03-09 RX ORDER — ANASTROZOLE 1 MG/1
TABLET ORAL
Qty: 90 TABLET | Refills: 3 | Status: SHIPPED | OUTPATIENT
Start: 2022-03-09 | End: 2022-03-30 | Stop reason: SDUPTHER

## 2022-03-30 RX ORDER — ANASTROZOLE 1 MG/1
TABLET ORAL
Qty: 90 TABLET | Refills: 3 | Status: SHIPPED | OUTPATIENT
Start: 2022-03-30 | End: 2022-09-12 | Stop reason: SDUPTHER

## 2022-05-17 ENCOUNTER — OFFICE VISIT (OUTPATIENT)
Dept: ONCOLOGY | Age: 72
End: 2022-05-17
Payer: MEDICARE

## 2022-05-17 ENCOUNTER — HOSPITAL ENCOUNTER (OUTPATIENT)
Dept: INFUSION THERAPY | Age: 72
Discharge: HOME | End: 2022-05-17
Payer: MEDICARE

## 2022-05-17 VITALS
RESPIRATION RATE: 16 BRPM | DIASTOLIC BLOOD PRESSURE: 66 MMHG | OXYGEN SATURATION: 98 % | TEMPERATURE: 98.6 F | SYSTOLIC BLOOD PRESSURE: 153 MMHG | HEART RATE: 71 BPM

## 2022-05-17 VITALS
TEMPERATURE: 98.6 F | RESPIRATION RATE: 16 BRPM | OXYGEN SATURATION: 98 % | SYSTOLIC BLOOD PRESSURE: 153 MMHG | HEIGHT: 64 IN | DIASTOLIC BLOOD PRESSURE: 66 MMHG | BODY MASS INDEX: 25.27 KG/M2 | WEIGHT: 148 LBS | HEART RATE: 71 BPM

## 2022-05-17 DIAGNOSIS — Z85.3 ENCOUNTER FOR FOLLOW-UP SURVEILLANCE OF BREAST CANCER: ICD-10-CM

## 2022-05-17 DIAGNOSIS — Z98.890 STATUS POST RIGHT BREAST LUMPECTOMY: ICD-10-CM

## 2022-05-17 DIAGNOSIS — Z79.811 PROPHYLACTIC USE OF ANASTROZOLE (ARIMIDEX): ICD-10-CM

## 2022-05-17 DIAGNOSIS — M25.50 AROMATASE INHIBITOR-ASSOCIATED ARTHRALGIA: ICD-10-CM

## 2022-05-17 DIAGNOSIS — T45.1X5A AROMATASE INHIBITOR-ASSOCIATED ARTHRALGIA: ICD-10-CM

## 2022-05-17 DIAGNOSIS — Z17.0 MALIGNANT NEOPLASM OF UPPER-OUTER QUADRANT OF RIGHT BREAST IN FEMALE, ESTROGEN RECEPTOR POSITIVE (HCC): Primary | ICD-10-CM

## 2022-05-17 DIAGNOSIS — Z08 ENCOUNTER FOR FOLLOW-UP SURVEILLANCE OF BREAST CANCER: ICD-10-CM

## 2022-05-17 DIAGNOSIS — C50.411 MALIGNANT NEOPLASM OF UPPER-OUTER QUADRANT OF RIGHT BREAST IN FEMALE, ESTROGEN RECEPTOR POSITIVE (HCC): Primary | ICD-10-CM

## 2022-05-17 PROCEDURE — 1090F PRES/ABSN URINE INCON ASSESS: CPT | Performed by: INTERNAL MEDICINE

## 2022-05-17 PROCEDURE — 1036F TOBACCO NON-USER: CPT | Performed by: INTERNAL MEDICINE

## 2022-05-17 PROCEDURE — 99214 OFFICE O/P EST MOD 30 MIN: CPT | Performed by: INTERNAL MEDICINE

## 2022-05-17 PROCEDURE — G8427 DOCREV CUR MEDS BY ELIG CLIN: HCPCS | Performed by: INTERNAL MEDICINE

## 2022-05-17 PROCEDURE — 4040F PNEUMOC VAC/ADMIN/RCVD: CPT | Performed by: INTERNAL MEDICINE

## 2022-05-17 PROCEDURE — 1123F ACP DISCUSS/DSCN MKR DOCD: CPT | Performed by: INTERNAL MEDICINE

## 2022-05-17 PROCEDURE — 99211 OFF/OP EST MAY X REQ PHY/QHP: CPT

## 2022-05-17 PROCEDURE — 3017F COLORECTAL CA SCREEN DOC REV: CPT | Performed by: INTERNAL MEDICINE

## 2022-05-17 PROCEDURE — G8417 CALC BMI ABV UP PARAM F/U: HCPCS | Performed by: INTERNAL MEDICINE

## 2022-05-17 PROCEDURE — G8399 PT W/DXA RESULTS DOCUMENT: HCPCS | Performed by: INTERNAL MEDICINE

## 2022-05-17 ASSESSMENT — ENCOUNTER SYMPTOMS
CHEST TIGHTNESS: 0
ABDOMINAL PAIN: 0
EYE DISCHARGE: 0
COLOR CHANGE: 0
SHORTNESS OF BREATH: 0
RECTAL PAIN: 0
CONSTIPATION: 0
NAUSEA: 0
FACIAL SWELLING: 0
DIARRHEA: 0
COUGH: 0
BACK PAIN: 0
WHEEZING: 0
BLOOD IN STOOL: 0
SORE THROAT: 0
TROUBLE SWALLOWING: 0
ABDOMINAL DISTENTION: 0
VOMITING: 0

## 2022-05-17 NOTE — PATIENT INSTRUCTIONS
1.  Was instructed to stop aromatase inhibitor. 2.  Will return in 4 weeks to see nurse practitioner.   If no improvement in pain she will have bone scan

## 2022-05-17 NOTE — PROGRESS NOTES
[facility] [address] [facility] ([facility]3] 57, [address] [facility] 83,[address] 200  [facility]  Dept: [phone]  Dept Fax: [phone]: [phone]    Visit Date:5/17/2022     Noelle Reardon is a 67 y.o. female who presents today for:   Chief Complaint   Patient presents with    Follow-up     Malignant neoplasm of upper-outer quadrant of right breast in female, estrogen receptor positive        HPI:   This is a 76-year-old female with h/o right breast cancer. The patient had annual mammogram in October 2019. It showed possible irregular density with architectural distortion in the left breast.  Additional compression and lateromedial views were recommended as well as left breast ultrasound. There was no sonographic correlate for the irregular left breast mammographic abnormality. Stereotactic guided biopsy was recommended. In addition ultrasound revealed an 0.8 cm x 0.7 cm oval lesion in the left breast upper outer aspect. Differential diagnosis included complex cyst or a solid mass. Diagnostic aspiration or possible core biopsy was recommended. Left breast core needle biopsy on October 23, 2019 showed atypical ductal hyperplasia, atypical lobular hyperplasia. On November 7, 2019 the patient underwent left breast lumpectomy that showed only focal atypical ductal hyperplasia, focal atypical lobular hyperplasia. On July 15, 2020 the patient underwent bilateral breast MRI, it showed to 1.9 cm x 1.1 cm irregular spiculated mass in the right breast at the 10 o'clock position. There were no abnormal looking axillary lymph nodes. Ultrasound of the right breast on July 23, 2020 confirmed the presence of irregular mass.   The same day the patient underwent ultrasound-guided biopsy which showed invasive ductal carcinoma, grade 2, estrogen receptor positive in 100% of cells, progesterone receptor positive and 60% of cells, HER-2 by IHC was equivocal 2+ but FISH was nonamplified  She  met with the surgeon  Kylah to discuss further surgical management. She decided to proceed with right lumpectomy. Due to age of 69 and clinically negative lymph node sentinel lymph node biopsy was omitted. Final pathology report showed: Lynnette Mo, right, lumpectomy:    Invasive ductal carcinoma, Larry grade 1, stage pT1c NX.    Ductal carcinoma in situ, low nuclear grade, cribriform and   micropapillary types.     Cranial margin positive for invasive carcinoma.    Changes consistent with previous biopsy site.       Fibrocystic changes including usual ductal hyperplasia, microcyst       formation, dense fibrosis, and columnar cell change. BMarguerite Fus, new lateral margin, excision:    Changes consistent with previous biopsy site.       Microcalcifications.    Negative for malignancy. Oncotype DX Breast Cancer Assay (RT-PCR) performed by Company   Breast Cancer Recurrence Score:   12     Due to positive cranial margin on August 24, 2020 she underwent reexcision of the cranial margin, it was negative for malignancy. She has had genetic testing by [facility] -Expanded panel , no clinically significant mutations were detected. he patient has completed postlumpectomy radiation treatment. She received total cumulative dose of 4005 cGy in 15 fractions. She completed radiation treatment on October 28, 2020      Interim history on 05/17/2022:  The patient presents to the medical oncology clinic for 6 months follow up while receiving adjuvant hormonal therapy with Arimidex. She reports significant worsening of her bone pain in hip and back. The patient denies any complaints related to her breasts.   No hospitalizations since last visit with NIALL chang   Past Medical History:   Diagnosis Date    Acquired hypothyroidism 5/25/2017    Atypical ductal hyperplasia, breast 2019    Left breast ADH, ALH, RAdial scar    Atypical lobular hyperplasia (ALH) of left breast 11/4/2019    Breast cancer ([facility] [address] 2020 cancer    Genetic testing     breast negative    History of therapeutic radiation 2020    right breast 15 treatments    Hyperlipidemia     Invasive ductal carcinoma of breast ([address] 07/2020    right    Osteopenia 5/25/2017    Osteoporosis 12/29/2016      Past Surgical History:   Procedure Laterality Date    APPENDECTOMY  1997    BREAST BIOPSY Right 1980    benign    BREAST BIOPSY Left 2019    ADH ALH Radial SCar    BREAST BIOPSY Right 2020    cancer    BREAST LUMPECTOMY Right 8/12/2020    RIGHT BREAST LUMPECTOMY WITH PRIOR NEEDLE LOC PLACEMENT and new lateral margin performed by Yanely Castrejon MD at [facility] Right 8/24/2020    RE-EXCISION CARNIAL MARGIN RIGHT BREAST performed by Yanely Castrejon MD at [facility] BREAST LUMPECTOMY Left 2019    BREAST SURGERY Left 11/7/2019    LEFT BREAST EXCISIONAL BIOPSY WITH NEEDLE LOCALIZATION PLACEMENT performed by Yanely Castrejon MD at [address]    ruptured ovarian cyst    SACHI [address] BIOPSY RIGHT  7/23/2020    SACHI [address] RIGHT 7/23/2020 Wade Kurtz MD [facility]    OVARY REMOVAL      TONSILLECTOMY  child    US BREAST FINE NEEDLE ASPIRATION Left 2000    US GUIDED NEEDLE LOC OF RIGHT BREAST  8/12/2020    US GUIDED NEEDLE LOC OF RIGHT BREAST 8/12/2020 [facility] rt & Lt      Family History   Problem Relation Age of Onset    Diabetes Mother     Heart Disease Mother         afib and pacer    High Blood Pressure Mother     Breast Cancer Mother 46    Breast Cancer Sister 62        negative genetic testing for breast    Dementia Maternal Uncle         alzheimer's    Dementia Maternal Aunt         alzheimer's    Colon Cancer Niece 39    COPD Father     No Known Problems Brother     No Known Problems Brother     Ovarian Cancer Neg Hx       Social History     Tobacco Use    Smoking status: Former Smoker     Packs/day: 0.25     Years: 20.00     Pack years: 5.00     Quit date: 1986     Years since quittin.4    Smokeless tobacco: Never Used   Substance Use Topics    Alcohol use: No     Alcohol/week: 0.0 standard drinks      Current Outpatient Medications   Medication Sig Dispense Refill    anastrozole (ARIMIDEX) 1 MG tablet TAKE 1 TABLET EVERY DAY 90 tablet 3    atorvastatin (LIPITOR) 20 MG tablet TAKE 1 TABLET EVERY DAY 90 tablet 3    Calcium-Magnesium-Vitamin D (CITRACAL CALCIUM+D PO) Take by mouth 2 times daily      Ubiquinol (QUNOL COQ10/UBIQUINOL/EFRAIN) 100 MG CAPS Take by mouth daily      aspirin EC 81 MG EC tablet Take 1 tablet by mouth daily 30 tablet 3    terbinafine (LAMISIL) 250 MG tablet Take 1 tablet by mouth daily 90 tablet 1    Cholecalciferol (VITAMIN D3) 50 MCG (2000) CAPS Take by mouth daily (Patient not taking: Reported on 2021)       No current facility-administered medications for this visit. No Known Allergies   Health Maintenance   Topic Date Due    Annual Wellness Visit (AWV)  2020    COVID-19 Vaccine (3 - Moderna risk 4-dose series) 2021    Depression Screen  2021    Breast cancer screen  2022    Lipids  2022    DTaP/Tdap/Td vaccine (2 - Td or Tdap) 2023    Colorectal Cancer Screen  2026    DEXA (modify frequency per FRAX score)  Completed    Flu vaccine  Completed    Shingles vaccine  Completed    Pneumococcal 65+ years Vaccine  Completed    Hepatitis C screen  Completed    Hepatitis A vaccine  Aged Out    Hepatitis B vaccine  Aged Out    Hib vaccine  Aged Out    Meningococcal (ACWY) vaccine  Aged Out        Subjective:   Review of Systems   Constitutional: Negative for activity change, appetite change, fatigue and fever. HENT: Negative for congestion, dental problem, facial swelling, hearing loss, mouth sores, nosebleeds, sore throat, tinnitus and trouble swallowing. Eyes: Negative for discharge and visual disturbance.    Respiratory: Negative for cough, chest tightness, shortness of breath and wheezing. Cardiovascular: Negative for chest pain, palpitations and leg swelling. Gastrointestinal: Negative for abdominal distention, abdominal pain, blood in stool, constipation, diarrhea, nausea, rectal pain and vomiting. Endocrine: Negative for cold intolerance, polydipsia and polyuria. Genitourinary: Negative for decreased urine volume, difficulty urinating, dysuria, flank pain, hematuria and urgency. Musculoskeletal: Negative for arthralgias, back pain, gait problem, joint swelling, myalgias and neck stiffness. Skin: Negative for color change, rash and wound. Neurological: Negative for dizziness, tremors, seizures, speech difficulty, weakness, light-headedness, numbness and headaches. Hematological: Negative for adenopathy. Does not bruise/bleed easily. Psychiatric/Behavioral: Negative for confusion and sleep disturbance. The patient is not nervous/anxious. Objective:   Physical Exam  Vitals reviewed. Constitutional:       General: She is not in acute distress. Appearance: She is well-developed. HENT:      Head: Normocephalic. Mouth/Throat:      Pharynx: No oropharyngeal exudate. Eyes:      General: No scleral icterus. Right eye: No discharge. Left eye: No discharge. Pupils: Pupils are equal, round, and reactive to light. Neck:      Thyroid: No thyromegaly. Vascular: No JVD. Trachea: No tracheal deviation. Cardiovascular:      Rate and Rhythm: Normal rate. Heart sounds: Normal heart sounds. No murmur heard. No friction rub. No gallop. Pulmonary:      Effort: Pulmonary effort is normal. No respiratory distress. Breath sounds: Normal breath sounds. No stridor. No wheezing or rales. Chest:      Chest wall: No tenderness. Breasts:      Right: Skin change (status post lumpectomy. Incision is nicely healed) present. Left: Skin change (incision post lumpectomy) present. Abdominal:      General: Bowel sounds are normal. There is no distension. Palpations: Abdomen is soft. There is no mass. Tenderness: There is no abdominal tenderness. There is no rebound. Musculoskeletal:         General: Normal range of motion. Cervical back: Normal range of motion and neck supple. Comments: Good range of motion in all four extremities. Lymphadenopathy:      Cervical: No cervical adenopathy. Skin:     General: Skin is warm. Findings: No erythema or rash. Neurological:      Mental Status: She is alert and oriented to person, place, and time. Cranial Nerves: No cranial nerve deficit. Motor: No abnormal muscle tone. Deep Tendon Reflexes: Reflexes are normal and symmetric. Psychiatric:         Behavior: Behavior normal.         Thought Content: Thought content normal.         Judgment: Judgment normal.         BP (!) 153/66 (Site: Left Upper Arm, Position: Sitting, Cuff Size: Medium Adult)   Pulse 71   Temp 98.6 °F (37 °C)   Resp 16   Ht 5' 4\" (1.626 m)   Wt 148 lb (67.1 kg)   SpO2 98%   BMI 25.40 kg/m²      ECOG status is 0    Imaging studies and labs:   No results found. Lab Results   Component Value Date    WBC 5.1 07/02/2021    HGB 14.8 07/02/2021    HCT 45.8 07/02/2021    MCV 92.7 07/02/2021     07/02/2021       Chemistry        Component Value Date/Time     07/02/2021 0945    K 4.0 07/02/2021 0945     07/02/2021 0945    CO2 28 07/02/2021 0945    BUN 14 07/02/2021 0945    CREATININE 0.7 07/02/2021 0945        Component Value Date/Time    CALCIUM 9.6 07/02/2021 0945    ALKPHOS 75 08/18/2021 1150    AST 16 08/18/2021 1150    ALT 12 08/18/2021 1150    BILITOT 0.3 08/18/2021 1150        Bone density in July 2020 showed:  IMPRESSION: OSTEOPENIA    Mammogram in June 2021 showed:  1. No mammographic evidence of malignancy. A 1 year screening mammogram is recommended.           Assessment/Plan:   1.   Stage IA (cT1c, cN0, cM0, G2, ER+, TN+, HER2-) right breast cancer  S/p lumpectomy. She had Oncotype DX assay that showed recurrence score 12. RS 25 or below identifies women with low risk of disease recurrence for whom chemotherapy is unlikely to provide a benefit. The patient belongs  to that group. Next,  she proceeded with postlumpectomy radiation treatment. She received 4005 cGy in 15 fractions. She completed radiation treatment in October 2020.  2. Adjuvant hormonal therapy with Aromatase Inhibitor. Started in November 2020. The patient had developed significantly worsening arthralgia. Denies having any vasomotor symptoms, no mood changes. She was instructed to stop taking AI. If no improvement in her bone pain on the return to clinic visit with the nurse practitioner she should have bone scan ordered. She denies any concerns related to her breasts. There is no evidence of disease recurrence on today's physical examination. Mammogram in June 2021 showed benign findings. Annual mammogram in the June 2022 is scheduled. .  3. Osteopenia. The patient had bone density study that showed osteopenia. We discussed lifestyle measures include adequate calcium and vitamin D, exercise, smoking cessation, fall prevention, and avoidance of heavy alcohol use. In general, 1200 mg of elemental calcium daily, total diet plus supplement, and 800 international units of vitamin D daily are recommended       Diagnosis Orders   1. Malignant neoplasm of upper-outer quadrant of right breast in female, estrogen receptor positive ([facility] [address]     2. Status post right breast lumpectomy     3. Prophylactic use of anastrozole (Arimidex)     4. Encounter for follow-up surveillance of breast cancer     5. Aromatase inhibitor-associated arthralgia          Plan:   No follow-ups on file. Orders Placed:   No orders of the defined types were placed in this encounter.        Medications Prescribed:   No orders of the defined types were placed in this encounter.

## 2022-06-17 ENCOUNTER — OFFICE VISIT (OUTPATIENT)
Dept: ONCOLOGY | Age: 72
End: 2022-06-17
Payer: MEDICARE

## 2022-06-17 ENCOUNTER — HOSPITAL ENCOUNTER (OUTPATIENT)
Dept: INFUSION THERAPY | Age: 72
Discharge: HOME | End: 2022-06-17
Payer: MEDICARE

## 2022-06-17 VITALS
RESPIRATION RATE: 18 BRPM | HEIGHT: 64 IN | BODY MASS INDEX: 25.44 KG/M2 | DIASTOLIC BLOOD PRESSURE: 70 MMHG | SYSTOLIC BLOOD PRESSURE: 164 MMHG | WEIGHT: 149 LBS | HEART RATE: 71 BPM | OXYGEN SATURATION: 96 % | TEMPERATURE: 98.8 F

## 2022-06-17 VITALS
DIASTOLIC BLOOD PRESSURE: 70 MMHG | HEART RATE: 71 BPM | SYSTOLIC BLOOD PRESSURE: 164 MMHG | RESPIRATION RATE: 18 BRPM | TEMPERATURE: 98.8 F | OXYGEN SATURATION: 96 %

## 2022-06-17 DIAGNOSIS — M85.80 OSTEOPENIA, UNSPECIFIED LOCATION: ICD-10-CM

## 2022-06-17 DIAGNOSIS — M89.8X9 BONE PAIN: Primary | ICD-10-CM

## 2022-06-17 DIAGNOSIS — Z17.0 MALIGNANT NEOPLASM OF UPPER-OUTER QUADRANT OF RIGHT BREAST IN FEMALE, ESTROGEN RECEPTOR POSITIVE (HCC): ICD-10-CM

## 2022-06-17 DIAGNOSIS — C50.411 MALIGNANT NEOPLASM OF UPPER-OUTER QUADRANT OF RIGHT BREAST IN FEMALE, ESTROGEN RECEPTOR POSITIVE (HCC): ICD-10-CM

## 2022-06-17 PROCEDURE — G8417 CALC BMI ABV UP PARAM F/U: HCPCS | Performed by: NURSE PRACTITIONER

## 2022-06-17 PROCEDURE — 3017F COLORECTAL CA SCREEN DOC REV: CPT | Performed by: NURSE PRACTITIONER

## 2022-06-17 PROCEDURE — 99214 OFFICE O/P EST MOD 30 MIN: CPT | Performed by: NURSE PRACTITIONER

## 2022-06-17 PROCEDURE — G8427 DOCREV CUR MEDS BY ELIG CLIN: HCPCS | Performed by: NURSE PRACTITIONER

## 2022-06-17 PROCEDURE — 1123F ACP DISCUSS/DSCN MKR DOCD: CPT | Performed by: NURSE PRACTITIONER

## 2022-06-17 PROCEDURE — 1036F TOBACCO NON-USER: CPT | Performed by: NURSE PRACTITIONER

## 2022-06-17 PROCEDURE — 99211 OFF/OP EST MAY X REQ PHY/QHP: CPT

## 2022-06-17 PROCEDURE — 1090F PRES/ABSN URINE INCON ASSESS: CPT | Performed by: NURSE PRACTITIONER

## 2022-06-17 PROCEDURE — G8399 PT W/DXA RESULTS DOCUMENT: HCPCS | Performed by: NURSE PRACTITIONER

## 2022-06-17 NOTE — PROGRESS NOTES
[facility] [address] [facility] 57, [address] [facility] [address]  [address] [address]  Dept: [phone]  Dept Fax: [phone]: [phone]      Visit Date:6/17/2022     Johanna Tierney is a 67 y.o. female who presents today for:   Chief Complaint   Patient presents with    Cancer     malignant neoplasm of upper outer quadrant of right breast in female,estrogen receptor positve [facility])        HPI:   Johanna Tierney is a 67 y.o. female who follows in our office with history of breast cancer. HPI per last note in our office on 5/17/2022:  The patient had annual mammogram in October 2019. It showed possible irregular density with architectural distortion in the left breast.  Additional compression and lateromedial views were recommended as well as left breast ultrasound. There was no sonographic correlate for the irregular left breast mammographic abnormality. Stereotactic guided biopsy was recommended. In addition ultrasound revealed an 0.8 cm x 0.7 cm oval lesion in the left breast upper outer aspect. Differential diagnosis included complex cyst or a solid mass. Diagnostic aspiration or possible core biopsy was recommended. Left breast core needle biopsy on October 23, 2019 showed atypical ductal hyperplasia, atypical lobular hyperplasia. On November 7, 2019 the patient underwent left breast lumpectomy that showed only focal atypical ductal hyperplasia, focal atypical lobular hyperplasia. On July 15, 2020 the patient underwent bilateral breast MRI, it showed to 1.9 cm x 1.1 cm irregular spiculated mass in the right breast at the 10 o'clock position. There were no abnormal looking axillary lymph nodes. Ultrasound of the right breast on July 23, 2020 confirmed the presence of irregular mass.   The same day the patient underwent ultrasound-guided biopsy which showed invasive ductal carcinoma, grade 2, estrogen receptor positive in 100% of cells, progesterone receptor positive and 60% of cells, HER-2 bone pain. After review of last note in our office, bone scan is planned if bone pain not improved. Placed order for bone scan. Last mammogram benign, next mammogram is scheduled on 6/23/2022. She will be due for DEXA scan in July. PMH, SH, and FH:  I reviewed the patient's medication and allergy lists as noted on the electronic medical record. The PMH, SH, and FH were also reviewed as noted on the EMR.         Past Medical History:   Diagnosis Date    Acquired hypothyroidism 5/25/2017    Atypical ductal hyperplasia, breast 2019    Left breast ADH, ALH, RAdial scar    Atypical lobular hyperplasia (ALH) of left breast 11/4/2019    Breast cancer ([facility] [address] 2020    cancer    Genetic testing     breast negative    History of therapeutic radiation 2020    right breast 15 treatments    Hyperlipidemia     Invasive ductal carcinoma of breast ([facility] [address] 07/2020    right    Osteopenia 5/25/2017    Osteoporosis 12/29/2016      Past Surgical History:   Procedure Laterality Date    APPENDECTOMY  1997    BREAST BIOPSY Right 1980    benign    BREAST BIOPSY Left 2019    ADH ALH Radial SCar    BREAST BIOPSY Right 2020    cancer    BREAST LUMPECTOMY Right 8/12/2020    RIGHT BREAST LUMPECTOMY WITH PRIOR NEEDLE LOC PLACEMENT and new lateral margin performed by Malissa Jauregui MD at [facility] Right 8/24/2020    RE-EXCISION CARNIAL MARGIN RIGHT BREAST performed by Malissa Jauregui MD at [address] LUMPECTOMY Left 2019    BREAST SURGERY Left 11/7/2019    LEFT BREAST EXCISIONAL BIOPSY WITH NEEDLE LOCALIZATION PLACEMENT performed by Malissa Jauregui MD at [address] [facility] ([facility])  1997    ruptured ovarian cyst    SACHI US GUID NDL BIOPSY RIGHT  7/23/2020    SACHI [address] RIGHT 7/23/2020 Toshia Wyatt MD [facility]    OVARY REMOVAL      TONSILLECTOMY  child    US BREAST FINE NEEDLE ASPIRATION Left 2000    US GUIDED NEEDLE LOC OF RIGHT BREAST  2020    US GUIDED NEEDLE LOC OF RIGHT BREAST 2020 [address] rt & Lt      Family History   Problem Relation Age of Onset    Diabetes Mother     Heart Disease Mother         afib and pacer    High Blood Pressure Mother     Breast Cancer Mother 46    Breast Cancer Sister 62        negative genetic testing for breast    Dementia Maternal Uncle         alzheimer's    Dementia Maternal Aunt         alzheimer's    Colon Cancer Niece 39    COPD Father     No Known Problems Brother     No Known Problems Brother     Ovarian Cancer Neg Hx       Social History     Tobacco Use    Smoking status: Former Smoker     Packs/day: 0.25     Years: 20.00     Pack years: 5.00     Quit date: 1986     Years since quittin.4    Smokeless tobacco: Never Used   Substance Use Topics    Alcohol use: No     Alcohol/week: 0.0 standard drinks      Current Outpatient Medications   Medication Sig Dispense Refill    anastrozole (ARIMIDEX) 1 MG tablet TAKE 1 TABLET EVERY DAY 90 tablet 3    atorvastatin (LIPITOR) 20 MG tablet TAKE 1 TABLET EVERY DAY 90 tablet 3    Calcium-Magnesium-Vitamin D (CITRACAL CALCIUM+D PO) Take by mouth 2 times daily      Ubiquinol (QUNOL COQ10/UBIQUINOL/EFRAIN) 100 MG CAPS Take by mouth daily      aspirin EC 81 MG EC tablet Take 1 tablet by mouth daily 30 tablet 3    Cholecalciferol (VITAMIN D3) 50 MCG (2000) CAPS Take by mouth daily (Patient not taking: Reported on 2021)       No current facility-administered medications for this visit. No Known Allergies       Review of Systems:   Review of Systems   Pertinent review of systems noted in HPI, all other ROS negative. Objective:   Physical Exam   BP (!) 164/70 (Site: Left Upper Arm, Position: Sitting)   Pulse 71   Temp 98.8 °F (37.1 °C) (Oral)   Resp 18   Ht 5' 4\" (1.626 m)   Wt 149 lb (67.6 kg)   SpO2 96%   BMI 25.58 kg/m²    General appearance: No apparent distress, calm and cooperative.   HEENT: Pupils equal, round, and reactive to light. Conjunctivae/corneas clear. Oral mucosa moist.  Neck: Supple, with full range of motion. Trachea midline. Respiratory:  Normal respiratory effort. Clear to auscultation all lung fields. Cardiovascular:  RRR, S1/S2. Abdomen: Soft, non-tender, non-distended with active BS   Musculoskeletal: No clubbing, cyanosis or edema bilaterally. She is able to ambulate in office. Skin: Skin color, texture, turgor normal.  No visible rashes or lesions. Neurologic:  Neurovascularly intact without any focal sensory/motor deficits. Cranial nerves: II-XII intact, grossly non-focal.  Psychiatric: Alert and oriented x 3, thought content appropriate, normal insight  Capillary Refill: < 3 seconds   Peripheral Pulses: +2 palpable, equal bilaterally       Imaging Studies and Labs:   CBC:   Lab Results   Component Value Date    WBC 5.1 07/02/2021    HGB 14.8 07/02/2021    HCT 45.8 07/02/2021    MCV 92.7 07/02/2021     07/02/2021     BMP:   Lab Results   Component Value Date     07/02/2021    K 4.0 07/02/2021     07/02/2021    CO2 28 07/02/2021    BUN 14 07/02/2021    CREATININE 0.7 07/02/2021    GLUCOSE 103 07/01/2020    CALCIUM 9.6 07/02/2021      LFT:   Lab Results   Component Value Date    ALT 12 08/18/2021    AST 16 08/18/2021    ALKPHOS 75 08/18/2021    BILITOT 0.3 08/18/2021         Assessment and Plan:     1. Malignant neoplasm of upper-outer quadrant of right breast in female, estrogen receptor positive ([facility] [address]  Mammogram 10/2019 (+) possible irregular density in left breast.  Biopsy (+) atypical ductal hyperplasia, atypical lobular hyperplasia. S/p lumpectomy 11/7/2019 with similar findings on pathology. S/p bilateral breast MRI 7/15/2020 (+) 1.9 cm x 1.1 cm irregular spiculated mass in right breast.  Biopsy (+) invasive ductal carcinoma, grade 2, ER (+), AZ (+).   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 S/p re-excision of cranial margin, negative for malignancy. She completed post-lumpectomy radiation treatment on 10/28/2020 with total cumulative dose of 4005 cGy in 15 fractions. She started Arimidex in November 2020.      2. Bone pain  Hip/lower back pain. Held Arimidex x 1 month, pain continues. Obtain Bone Scan.    - NM BONE SCAN WHOLE BODY; Future    3. Osteopenia, unspecified location  Hx osteopenia from last DEXA scan in July 2020. She will be due for repeat DEXA scan July 2022.      - DEXA BONE DENSITY AXIAL SKELETON; Future      - DEXA BONE DENSITY AXIAL SKELETON; Future    No follow-ups on file. All patient questions answered. Pt voiced understanding. Patient agreed with treatment plan. Follow up as directed. Patient instructed to call for questions or concerns. Electronically signed by   JORDEN Pat CNP     I spent a total of 31 minutes on the day of the visit.

## 2022-06-17 NOTE — PATIENT INSTRUCTIONS
1.  Get Bone Scan done  2. Dexa scan after 7/15/2022  2. Return to clinic 1 week after bone scan to see Nora   3.   Can try glucosamine chondroitin for joint pain

## 2022-06-23 ENCOUNTER — HOSPITAL ENCOUNTER (OUTPATIENT)
Dept: MAMMOGRAPHY | Age: 72
Discharge: HOME | End: 2022-06-23
Payer: MEDICARE

## 2022-06-23 DIAGNOSIS — Z12.39 SCREENING BREAST EXAMINATION: ICD-10-CM

## 2022-06-23 PROCEDURE — 77063 BREAST TOMOSYNTHESIS BI: CPT

## 2022-06-27 ENCOUNTER — HOSPITAL ENCOUNTER (OUTPATIENT)
Dept: NUCLEAR MEDICINE | Age: 72
Discharge: HOME | End: 2022-06-27
Payer: MEDICARE

## 2022-06-27 DIAGNOSIS — M89.8X9 BONE PAIN: ICD-10-CM

## 2022-06-27 PROCEDURE — A9503 TC99M MEDRONATE: HCPCS | Performed by: NURSE PRACTITIONER

## 2022-06-27 PROCEDURE — 3430000000 HC RX DIAGNOSTIC RADIOPHARMACEUTICAL: Performed by: NURSE PRACTITIONER

## 2022-06-27 PROCEDURE — 78306 BONE IMAGING WHOLE BODY: CPT

## 2022-06-27 RX ORDER — TC 99M MEDRONATE 20 MG/10ML
26.5 INJECTION, POWDER, LYOPHILIZED, FOR SOLUTION INTRAVENOUS
Status: COMPLETED | OUTPATIENT
Start: 2022-06-27 | End: 2022-06-27

## 2022-06-27 RX ADMIN — TC 99M MEDRONATE 26.5 MILLICURIE: 20 INJECTION, POWDER, LYOPHILIZED, FOR SOLUTION INTRAVENOUS at 08:45

## 2022-07-05 ENCOUNTER — TELEPHONE (OUTPATIENT)
Dept: ONCOLOGY | Age: 72
End: 2022-07-05

## 2022-07-05 NOTE — TELEPHONE ENCOUNTER
Called patient and reviewed scan:     Impression       1. No scintigraphic evidence of osseous metastatic disease. 2. Probable degenerative arthropathic changes as described above. Discussed okay to resume Arimidex. She will resume every other day at night until follow up with Karlo Zabala NP on 7/22/22. Instructed to take glucosamine and chondroitin.

## 2022-07-07 ENCOUNTER — TELEPHONE (OUTPATIENT)
Dept: FAMILY MEDICINE CLINIC | Age: 72
End: 2022-07-07

## 2022-07-07 DIAGNOSIS — M81.0 AGE-RELATED OSTEOPOROSIS WITHOUT CURRENT PATHOLOGICAL FRACTURE: Primary | ICD-10-CM

## 2022-07-07 NOTE — TELEPHONE ENCOUNTER
Pt was scheduled for Prolia at [address] [place] [address] on 07/011/2022 - Left message on her cell that appt was being cancelled because we do not have any Prolia in office - Insurance Verification with her Gwendel Rice card submitted - will call pt back to reschedule Prolia

## 2022-07-12 NOTE — TELEPHONE ENCOUNTER
Pt contacted - new policy for Prolia explained to Pt as well as need for Calcium  And Creatinine - pt plans to stop tomorrow in AM for labs - Prolia Therapy Plan Order for OP Nursing will be faxed

## 2022-07-13 ENCOUNTER — NURSE ONLY (OUTPATIENT)
Dept: LAB | Age: 72
End: 2022-07-13

## 2022-07-13 DIAGNOSIS — M81.0 OSTEOPOROSIS, UNSPECIFIED OSTEOPOROSIS TYPE, UNSPECIFIED PATHOLOGICAL FRACTURE PRESENCE: Primary | ICD-10-CM

## 2022-07-13 DIAGNOSIS — M81.0 OSTEOPOROSIS, UNSPECIFIED OSTEOPOROSIS TYPE, UNSPECIFIED PATHOLOGICAL FRACTURE PRESENCE: ICD-10-CM

## 2022-07-13 LAB
ALBUMIN SERPL-MCNC: 4.5 G/DL (ref 3.5–5.1)
ALP BLD-CCNC: 67 U/L (ref 38–126)
ALT SERPL-CCNC: 13 U/L (ref 11–66)
ANION GAP SERPL CALCULATED.3IONS-SCNC: 8 MEQ/L (ref 8–16)
AST SERPL-CCNC: 17 U/L (ref 5–40)
BILIRUB SERPL-MCNC: 0.4 MG/DL (ref 0.3–1.2)
BUN BLDV-MCNC: 11 MG/DL (ref 7–22)
CALCIUM SERPL-MCNC: 10.2 MG/DL (ref 8.5–10.5)
CHLORIDE BLD-SCNC: 104 MEQ/L (ref 98–111)
CO2: 29 MEQ/L (ref 23–33)
CREAT SERPL-MCNC: 0.7 MG/DL (ref 0.4–1.2)
GFR SERPL CREATININE-BSD FRML MDRD: 82 ML/MIN/1.73M2
GLUCOSE BLD-MCNC: 100 MG/DL (ref 70–108)
POTASSIUM SERPL-SCNC: 4.6 MEQ/L (ref 3.5–5.2)
SODIUM BLD-SCNC: 141 MEQ/L (ref 135–145)
TOTAL PROTEIN: 6.9 G/DL (ref 6.1–8)

## 2022-07-13 RX ORDER — SODIUM CHLORIDE 9 MG/ML
INJECTION, SOLUTION INTRAVENOUS CONTINUOUS
Status: CANCELLED | OUTPATIENT
Start: 2022-07-13

## 2022-07-13 RX ORDER — DIPHENHYDRAMINE HYDROCHLORIDE 50 MG/ML
50 INJECTION INTRAMUSCULAR; INTRAVENOUS
Status: CANCELLED | OUTPATIENT
Start: 2022-07-13

## 2022-07-13 RX ORDER — ACETAMINOPHEN 325 MG/1
650 TABLET ORAL
Status: CANCELLED | OUTPATIENT
Start: 2022-07-13

## 2022-07-13 RX ORDER — EPINEPHRINE 1 MG/ML
0.3 INJECTION, SOLUTION, CONCENTRATE INTRAVENOUS PRN
Status: CANCELLED | OUTPATIENT
Start: 2022-07-13

## 2022-07-13 RX ORDER — ONDANSETRON 2 MG/ML
8 INJECTION INTRAMUSCULAR; INTRAVENOUS
Status: CANCELLED | OUTPATIENT
Start: 2022-07-13

## 2022-07-13 RX ORDER — ALBUTEROL SULFATE 90 UG/1
4 AEROSOL, METERED RESPIRATORY (INHALATION) PRN
Status: CANCELLED | OUTPATIENT
Start: 2022-07-13

## 2022-07-15 DIAGNOSIS — M85.9 DISORDER OF BONE DENSITY AND STRUCTURE, UNSPECIFIED: Primary | ICD-10-CM

## 2022-07-15 DIAGNOSIS — Z78.0 MENOPAUSE: ICD-10-CM

## 2022-07-18 ENCOUNTER — HOSPITAL ENCOUNTER (OUTPATIENT)
Dept: WOMENS IMAGING | Age: 72
Discharge: HOME | End: 2022-07-18
Payer: MEDICARE

## 2022-07-18 DIAGNOSIS — M85.80 OSTEOPENIA, UNSPECIFIED LOCATION: ICD-10-CM

## 2022-07-18 PROCEDURE — 77080 DXA BONE DENSITY AXIAL: CPT

## 2022-07-20 ENCOUNTER — HOSPITAL ENCOUNTER (OUTPATIENT)
Dept: GENERAL RADIOLOGY | Age: 72
Discharge: HOME | End: 2022-07-20
Payer: MEDICARE

## 2022-07-20 ENCOUNTER — TELEPHONE (OUTPATIENT)
Dept: FAMILY MEDICINE CLINIC | Age: 72
End: 2022-07-20

## 2022-07-20 VITALS
TEMPERATURE: 98.8 F | DIASTOLIC BLOOD PRESSURE: 63 MMHG | SYSTOLIC BLOOD PRESSURE: 130 MMHG | OXYGEN SATURATION: 99 % | RESPIRATION RATE: 16 BRPM | HEART RATE: 71 BPM

## 2022-07-20 DIAGNOSIS — M81.0 OSTEOPOROSIS, UNSPECIFIED OSTEOPOROSIS TYPE, UNSPECIFIED PATHOLOGICAL FRACTURE PRESENCE: Primary | ICD-10-CM

## 2022-07-20 DIAGNOSIS — C50.411 MALIGNANT NEOPLASM OF UPPER-OUTER QUADRANT OF RIGHT BREAST IN FEMALE, ESTROGEN RECEPTOR POSITIVE (HCC): ICD-10-CM

## 2022-07-20 DIAGNOSIS — E78.00 PURE HYPERCHOLESTEROLEMIA: Primary | ICD-10-CM

## 2022-07-20 DIAGNOSIS — E03.9 ACQUIRED HYPOTHYROIDISM: ICD-10-CM

## 2022-07-20 DIAGNOSIS — Z17.0 MALIGNANT NEOPLASM OF UPPER-OUTER QUADRANT OF RIGHT BREAST IN FEMALE, ESTROGEN RECEPTOR POSITIVE (HCC): ICD-10-CM

## 2022-07-20 PROCEDURE — 96372 THER/PROPH/DIAG INJ SC/IM: CPT

## 2022-07-20 PROCEDURE — 6360000002 HC RX W HCPCS: Performed by: FAMILY MEDICINE

## 2022-07-20 RX ORDER — SODIUM CHLORIDE 9 MG/ML
INJECTION, SOLUTION INTRAVENOUS CONTINUOUS
OUTPATIENT
Start: 2023-01-11

## 2022-07-20 RX ORDER — ALBUTEROL SULFATE 90 UG/1
4 AEROSOL, METERED RESPIRATORY (INHALATION) PRN
OUTPATIENT
Start: 2023-01-11

## 2022-07-20 RX ORDER — ONDANSETRON 2 MG/ML
8 INJECTION INTRAMUSCULAR; INTRAVENOUS
OUTPATIENT
Start: 2023-01-11

## 2022-07-20 RX ORDER — DIPHENHYDRAMINE HYDROCHLORIDE 50 MG/ML
50 INJECTION INTRAMUSCULAR; INTRAVENOUS
OUTPATIENT
Start: 2023-01-11

## 2022-07-20 RX ORDER — ACETAMINOPHEN 325 MG/1
650 TABLET ORAL
OUTPATIENT
Start: 2023-01-11

## 2022-07-20 RX ADMIN — DENOSUMAB 60 MG: 60 INJECTION SUBCUTANEOUS at 08:59

## 2022-07-20 NOTE — TELEPHONE ENCOUNTER
Patient stopped into the office stating she made an appt with Dr Terri Gonzalez next week sometime and wanted to know if she has to get blood work done before that appt as she was going to go over the results at that time as well. Please advise and let patient know if labs are needed.  [phone]

## 2022-07-20 NOTE — PROGRESS NOTES
Pt. Presents ambulatory to ED for outpt. Injection of prolia. Pt  denies any question, does not wish to wait. Met: yes   Safety:         (Environmental)  [place] to environment  Ensure ID band is correct and in place/ allergy band as needed  Assess for fall risk  Initiate fall precautions as applicable (fall band, side rails, etc.)  Call light within reach  Bed in low position/ wheels locked    Met: yes   Pain:       Assess pain level and characteristics  Administer analgesics as ordered  Assess effectiveness of pain management and report to MD as needed    Met: yes   Knowledge Deficit:  Assess baseline knowledge  Provide teaching at level of understanding  Provide teaching via preferred learning method  Evaluate teaching effectiveness    Met: yes   Hemodynamic/Respiratory Status:       (Pre and Post Procedure Monitoring)  Assess/Monitor vital signs and LOC  Assess Baseline SpO2 prior to any sedation  Obtain weight/height  Assess vital signs/ LOC until patient meets discharge criteria  Monitor procedure site and notify MD of any issues  Released satis. cond. to self.

## 2022-07-22 ENCOUNTER — HOSPITAL ENCOUNTER (OUTPATIENT)
Dept: INFUSION THERAPY | Age: 72
Discharge: HOME | End: 2022-07-22
Payer: MEDICARE

## 2022-07-22 ENCOUNTER — OFFICE VISIT (OUTPATIENT)
Dept: ONCOLOGY | Age: 72
End: 2022-07-22
Payer: MEDICARE

## 2022-07-22 VITALS
BODY MASS INDEX: 25.1 KG/M2 | DIASTOLIC BLOOD PRESSURE: 66 MMHG | HEART RATE: 74 BPM | WEIGHT: 147 LBS | SYSTOLIC BLOOD PRESSURE: 152 MMHG | TEMPERATURE: 98.3 F | HEIGHT: 64 IN | RESPIRATION RATE: 18 BRPM | OXYGEN SATURATION: 98 %

## 2022-07-22 DIAGNOSIS — C50.411 MALIGNANT NEOPLASM OF UPPER-OUTER QUADRANT OF RIGHT BREAST IN FEMALE, ESTROGEN RECEPTOR POSITIVE (HCC): Primary | ICD-10-CM

## 2022-07-22 DIAGNOSIS — Z79.811 PROPHYLACTIC USE OF ANASTROZOLE (ARIMIDEX): ICD-10-CM

## 2022-07-22 DIAGNOSIS — Z17.0 MALIGNANT NEOPLASM OF UPPER-OUTER QUADRANT OF RIGHT BREAST IN FEMALE, ESTROGEN RECEPTOR POSITIVE (HCC): Primary | ICD-10-CM

## 2022-07-22 PROCEDURE — 1123F ACP DISCUSS/DSCN MKR DOCD: CPT | Performed by: NURSE PRACTITIONER

## 2022-07-22 PROCEDURE — 99211 OFF/OP EST MAY X REQ PHY/QHP: CPT

## 2022-07-22 PROCEDURE — 99213 OFFICE O/P EST LOW 20 MIN: CPT | Performed by: NURSE PRACTITIONER

## 2022-07-22 NOTE — PROGRESS NOTES
[facility] [address] [place] [address]  [address] [address]  Dept: [phone]  Dept Fax: [phone]: [phone]      Visit Date:7/22/2022     Gideon Reardon is a 67 y.o. female who presents today for:   Chief Complaint   Patient presents with    Follow-up     Malignant neoplasm of upper-outer quadrant of right breast in female, estrogen receptor positive ([address]        HPI:   Gideon Reardon is a 67 y.o. female who follows in our office with history of breast cancer. HPI per last note in our office on 5/17/2022:  The patient had annual mammogram in October 2019. It showed possible irregular density with architectural distortion in the left breast.  Additional compression and lateromedial views were recommended as well as left breast ultrasound. There was no sonographic correlate for the irregular left breast mammographic abnormality. Stereotactic guided biopsy was recommended. In addition ultrasound revealed an 0.8 cm x 0.7 cm oval lesion in the left breast upper outer aspect. Differential diagnosis included complex cyst or a solid mass. Diagnostic aspiration or possible core biopsy was recommended. Left breast core needle biopsy on October 23, 2019 showed atypical ductal hyperplasia, atypical lobular hyperplasia. On November 7, 2019 the patient underwent left breast lumpectomy that showed only focal atypical ductal hyperplasia, focal atypical lobular hyperplasia. On July 15, 2020 the patient underwent bilateral breast MRI, it showed to 1.9 cm x 1.1 cm irregular spiculated mass in the right breast at the 10 o'clock position. There were no abnormal looking axillary lymph nodes. Ultrasound of the right breast on July 23, 2020 confirmed the presence of irregular mass.   The same day the patient underwent ultrasound-guided biopsy which showed invasive ductal carcinoma, grade 2, estrogen receptor positive in 100% of cells, progesterone receptor positive and 60% of cells, HER-2 by IHC was equivocal 2+ but FISH was nonamplified  She  met with the surgeon Dr. Lida Mello to discuss further surgical management. She decided to proceed with right lumpectomy. Due to age of 69 and clinically negative lymph node sentinel lymph node biopsy was omitted. Final pathology report showed: Lucy Fontenot Breast, right, lumpectomy:    Invasive ductal carcinoma, [place] grade 1, stage pT1c NX. Ductal carcinoma in situ, low nuclear grade, cribriform and   micropapillary types. Cranial margin positive for invasive carcinoma. Changes consistent with previous biopsy site. Fibrocystic changes including usual ductal hyperplasia, microcyst       formation, dense fibrosis, and columnar cell change. B.  Breast, new lateral margin, excision:    Changes consistent with previous biopsy site. Microcalcifications. Negative for malignancy. Oncotype DX Breast Cancer Assay (RT-PCR) performed by [employer]   Breast Cancer Recurrence Score:   12     Due to positive cranial margin on August 24, 2020 she underwent reexcision of the cranial margin, it was negative for malignancy. She has had genetic testing by [facility] panel , no clinically significant mutations were detected. he patient has completed postlumpectomy radiation treatment. She received total cumulative dose of 4005 cGy in 15 fractions. She completed radiation treatment on October 28, 2020         Interval History 7/22/2022:   The patient presents to the office today for follow up and evaluation of history of breast cancer. The patient reports she restarted Arimidex every other day and is tolerating well. No hot flashes, minimal arthralgias. She is ready to start Arimidex daily. She started glucosamine chondroitin 2 weeks ago and hasn't noticed much of a difference yet. She states she started low dose and will increase her dose.   DEXA scan with findings of osteopenia, she is on Ca & Vit D. Bone scan with findings of degenerative arthropathic changes. She offers no complaints. She denies fever/chills, s/s infections, headaches, dizziness, cough, SOB, CP, heart palpitations, abdominal pain, changes to bowel/bladder, s/s bleeding. PMH, SH, and FH:  I reviewed the patient's medication and allergy lists as noted on the electronic medical record. The PMH, SH, and FH were also reviewed as noted on the EMR.         Past Medical History:   Diagnosis Date    Acquired hypothyroidism 5/25/2017    Atypical ductal hyperplasia, breast 2019    Left breast ADH, ALH, RAdial scar    Atypical lobular hyperplasia (ALH) of left breast 11/4/2019    BRCA1 negative     neg    BRCA2 negative     neg    Breast cancer ([facility] 2020    cancer    Genetic testing     breast negative    History of therapeutic radiation 2020    right breast 15 treatments    Hyperlipidemia     Invasive ductal carcinoma of breast ([facility] [address] 07/2020    right    Osteopenia 5/25/2017    Osteoporosis 12/29/2016      Past Surgical History:   Procedure Laterality Date    APPENDECTOMY  1997    BREAST BIOPSY Right 1980    benign    BREAST BIOPSY Left 2019    ADH ALH Radial SCar    BREAST BIOPSY Right 2020    cancer    BREAST LUMPECTOMY Right 8/12/2020    RIGHT BREAST LUMPECTOMY WITH PRIOR NEEDLE LOC PLACEMENT and new lateral margin performed by Timur Goldberg MD at [facility] [address] LUMPECTOMY Right 8/24/2020    RE-EXCISION CARNIAL MARGIN RIGHT BREAST performed by Timur Goldberg MD at [address] LUMPECTOMY Left 2019    BREAST SURGERY Left 11/7/2019    LEFT BREAST EXCISIONAL BIOPSY WITH NEEDLE LOCALIZATION PLACEMENT performed by Timur Goldberg MD at [address] [place] [address] ([address])  1997    ruptured ovarian cyst    [facility] BIOPSY RIGHT  7/23/2020    [facility] [address] 7/23/2020 Dina Call MD C/ Alex 9  child    [address] Left     US GUIDED NEEDLE LOC OF RIGHT BREAST  2020    US GUIDED NEEDLE LOC OF RIGHT BREAST 2020 AVILA Isaacs [address] & [address]      Family History   Problem Relation Age of Onset    Diabetes Mother     Heart Disease Mother         afib and pacer    High Blood Pressure Mother     Breast Cancer Mother 46    Breast Cancer Sister 62        negative genetic testing for breast    Dementia Maternal Uncle         alzheimer's    Dementia Maternal Aunt         alzheimer's    Colon Cancer Niece 39    COPD Father     No Known Problems Brother     No Known Problems Brother     Ovarian Cancer Neg Hx       Social History     Tobacco Use    Smoking status: Former     Packs/day: 0.25     Years: 20.00     Pack years: 5.00     Types: Cigarettes     Quit date: 1986     Years since quittin.5    Smokeless tobacco: Never   Substance Use Topics    Alcohol use: No     Alcohol/week: 0.0 standard drinks      Current Outpatient Medications   Medication Sig Dispense Refill    anastrozole (ARIMIDEX) 1 MG tablet TAKE 1 TABLET EVERY DAY 90 tablet 3    atorvastatin (LIPITOR) 20 MG tablet TAKE 1 TABLET EVERY DAY 90 tablet 3    Calcium-Magnesium-Vitamin D (CITRACAL CALCIUM+D PO) Take by mouth 2 times daily      Ubiquinol 100 MG CAPS Take by mouth daily      aspirin EC 81 MG EC tablet Take 1 tablet by mouth daily 30 tablet 3    Cholecalciferol (VITAMIN D3) 50 MCG (2000) CAPS Take by mouth daily (Patient not taking: Reported on 2021)       No current facility-administered medications for this visit. No Known Allergies       Review of Systems:   Review of Systems   Pertinent review of systems noted in HPI, all other ROS negative. Objective:   Physical Exam   BP (!) 152/66   Pulse 74   Temp 98.3 °F (36.8 °C) (Oral)   Resp 18   Ht 5' 4\" (1.626 m)   Wt 147 lb (66.7 kg)   SpO2 98%   BMI 25.23 kg/m²    General appearance: No apparent distress, calm and cooperative. HEENT: Pupils equal, round, and reactive to light. Conjunctivae/corneas clear. Oral mucosa moist.  Neck: Supple, with full range of motion. Trachea midline. Respiratory:  Normal respiratory effort. Clear to auscultation all lung fields. Cardiovascular:  RRR, S1/S2. Abdomen: Soft, non-tender, non-distended with active BS  Musculoskeletal: No clubbing, cyanosis or edema bilaterally. She is able to ambulate in office without difficulty or use of assistive device. Skin: Skin color, texture, turgor normal.  No visible rashes or lesions. Neurologic:  Neurovascularly intact without any focal sensory/motor deficits. Cranial nerves: II-XII intact, grossly non-focal.  Psychiatric: Alert and oriented x 3, thought content appropriate, normal insight  Capillary Refill: < 3 seconds   Peripheral Pulses: +2 palpable, equal bilaterally       Imaging Studies and Labs:   CBC:   Lab Results   Component Value Date    WBC 5.1 07/02/2021    HGB 14.8 07/02/2021    HCT 45.8 07/02/2021    MCV 92.7 07/02/2021     07/02/2021     BMP:   Lab Results   Component Value Date/Time     07/13/2022 09:22 AM    K 4.6 07/13/2022 09:22 AM     07/13/2022 09:22 AM    CO2 29 07/13/2022 09:22 AM    BUN 11 07/13/2022 09:22 AM    CREATININE 0.7 07/13/2022 09:22 AM    GLUCOSE 100 07/13/2022 09:22 AM    CALCIUM 10.2 07/13/2022 09:22 AM      LFT:   Lab Results   Component Value Date    ALT 13 07/13/2022    AST 17 07/13/2022    ALKPHOS 67 07/13/2022    BILITOT 0.4 07/13/2022         Assessment and Plan:     1. Malignant neoplasm of upper-outer quadrant of right breast in female, estrogen receptor positive ([facility] [address]  Mammogram 10/2019 (+) possible irregular density in left breast.  Biopsy (+) atypical ductal hyperplasia, atypical lobular hyperplasia. S/p lumpectomy 11/7/2019 with similar findings on pathology. S/p bilateral breast MRI 7/15/2020 (+) 1.9 cm x 1.1 cm irregular spiculated mass in right breast.  Biopsy (+) invasive ductal carcinoma, grade 2, ER (+), MI (+).   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 S/p re-excision of cranial margin, negative for malignancy. She completed post-lumpectomy radiation treatment on 10/28/2020 with total cumulative dose of 4005 cGy in 15 fractions. She started Arimidex in November 2020 up until May when she developed hip/lower back pain. Arimidex held for 1 month, pain continued. Bone scan obtained with findings of degenerative arthropathic changes. She restarted Arimidex every other day and is ready to restart daily. 2. Prophylactic use of anastrozole (Arimidex)  She started Arimidex in November 2020 up until May when she developed hip/lower back pain. Arimidex held for 1 month, pain continued. Bone scan obtained with findings of degenerative arthropathic changes. She restarted Arimidex every other day and is ready to restart daily. Continue Arimidex daily. Return in about 6 months (around 1/22/2023). All patient questions answered. Pt voiced understanding. Patient agreed with treatment plan. Follow up as directed. Patient instructed to call for questions or concerns.       Electronically signed by   JORDEN Dodge CNP

## 2022-07-25 ENCOUNTER — NURSE ONLY (OUTPATIENT)
Dept: LAB | Age: 72
End: 2022-07-25

## 2022-07-25 DIAGNOSIS — Z17.0 MALIGNANT NEOPLASM OF UPPER-OUTER QUADRANT OF RIGHT BREAST IN FEMALE, ESTROGEN RECEPTOR POSITIVE (HCC): ICD-10-CM

## 2022-07-25 DIAGNOSIS — E78.00 PURE HYPERCHOLESTEROLEMIA: ICD-10-CM

## 2022-07-25 DIAGNOSIS — E03.9 ACQUIRED HYPOTHYROIDISM: ICD-10-CM

## 2022-07-25 DIAGNOSIS — C50.411 MALIGNANT NEOPLASM OF UPPER-OUTER QUADRANT OF RIGHT BREAST IN FEMALE, ESTROGEN RECEPTOR POSITIVE (HCC): ICD-10-CM

## 2022-07-25 LAB
ALBUMIN SERPL-MCNC: 4.1 G/DL (ref 3.5–5.1)
ALP BLD-CCNC: 68 U/L (ref 38–126)
ALT SERPL-CCNC: 11 U/L (ref 11–66)
ANION GAP SERPL CALCULATED.3IONS-SCNC: 8 MEQ/L (ref 8–16)
AST SERPL-CCNC: 15 U/L (ref 5–40)
BILIRUB SERPL-MCNC: 0.3 MG/DL (ref 0.3–1.2)
BUN BLDV-MCNC: 10 MG/DL (ref 7–22)
CALCIUM SERPL-MCNC: 8.9 MG/DL (ref 8.5–10.5)
CHLORIDE BLD-SCNC: 107 MEQ/L (ref 98–111)
CHOLESTEROL, TOTAL: 126 MG/DL (ref 100–199)
CO2: 27 MEQ/L (ref 23–33)
CREAT SERPL-MCNC: 0.8 MG/DL (ref 0.4–1.2)
ERYTHROCYTE [DISTWIDTH] IN BLOOD BY AUTOMATED COUNT: 12.2 % (ref 11.5–14.5)
ERYTHROCYTE [DISTWIDTH] IN BLOOD BY AUTOMATED COUNT: 41.9 FL (ref 35–45)
GFR SERPL CREATININE-BSD FRML MDRD: 70 ML/MIN/1.73M2
GLUCOSE FASTING: 91 MG/DL (ref 70–108)
HCT VFR BLD CALC: 43.7 % (ref 37–47)
HDLC SERPL-MCNC: 51 MG/DL
HEMOGLOBIN: 14 GM/DL (ref 12–16)
LDL CHOLESTEROL CALCULATED: 58 MG/DL
MCH RBC QN AUTO: 30.3 PG (ref 26–33)
MCHC RBC AUTO-ENTMCNC: 32 GM/DL (ref 32.2–35.5)
MCV RBC AUTO: 94.6 FL (ref 81–99)
PLATELET # BLD: 246 THOU/MM3 (ref 130–400)
PMV BLD AUTO: 11.2 FL (ref 9.4–12.4)
POTASSIUM SERPL-SCNC: 4.4 MEQ/L (ref 3.5–5.2)
RBC # BLD: 4.62 MILL/MM3 (ref 4.2–5.4)
SODIUM BLD-SCNC: 142 MEQ/L (ref 135–145)
TOTAL PROTEIN: 6.5 G/DL (ref 6.1–8)
TRIGL SERPL-MCNC: 85 MG/DL (ref 0–199)
TSH SERPL DL<=0.05 MIU/L-ACNC: 1.14 UIU/ML (ref 0.4–4.2)
WBC # BLD: 6.2 THOU/MM3 (ref 4.8–10.8)

## 2022-07-26 NOTE — PROGRESS NOTES
[address] [place] [address]  Dept: [phone]  Dept Fax: [phone]  Loc: [phone]    Mary Beth Villasenor is a 67 y.o. female who presents today for:  No chief complaint on file. HPI:     HPI    Well Adult Physical: Patient here for a comprehensive physical exam.The patient reports problems - cholesterol and thyroid issues are controlled on current medications. Dexa scan in 2015 shows osteoporosis. ***  Do you take any herbs or supplements that were not prescribed by a doctor? no Are you taking calcium supplements? yes Are you taking aspirin daily? yes   History:  Any STD's in the past? none    In  she was diagnosed with a mass in her breast and had a core needle biopsy which was suspicious and subsequent lumpectomy which showed no cancer. She has a strong family history of breast cancer so she had a breast MRI in July 2020. She was found to have an abnormal mass in the right breast and had a lumpectomy that showed an invasive ductal carcinoma. She is still seeing oncology and surgery for follow up.    ***        Reviewed chart forpast medical history , surgical history , allergies, social history , family history and medications.     Health Maintenance   Topic Date Due    Depression Screen  Never done    Annual Wellness Visit (AWV)  05/08/2020    COVID-19 Vaccine (3 - Moderna risk series) 04/02/2021    Flu vaccine (1) 09/01/2022    Breast cancer screen  06/23/2023    Lipids  07/25/2023    DTaP/Tdap/Td vaccine (2 - Td or Tdap) 08/14/2023    Colorectal Cancer Screen  01/29/2026    DEXA (modify frequency per FRAX score)  Completed    Shingles vaccine  Completed    Pneumococcal 65+ years Vaccine  Completed    Hepatitis C screen  Completed    Hepatitis A vaccine  Aged Out    Hepatitis B vaccine  Aged Out    Hib vaccine  Aged Out    Meningococcal (ACWY) vaccine  Aged Out       Subjective: Constitutional:Negative for fever, chills, diaphoresis, activity change, appetite change and fatigue. HENT: Negative for hearing loss, ear pain, congestion, sore throat, rhinorrhea, postnasal drip and ear discharge. Eyes: Negative for photophobia and visual disturbance. Respiratory: Negative for cough, chest tightness, shortness of breath and wheezing. Cardiovascular: Negative for chest pain and leg swelling. Gastrointestinal: Negative for nausea, vomiting, abdominal pain, diarrhea and constipation. Genitourinary: Negative for dysuria, urgency and frequency. Neurological: Negative for weakness, light-headedness and headaches. Psychiatric/Behavioral: Negative for sleep disturbance.      :     There were no vitals filed for this visit. Wt Readings from Last 3 Encounters:   07/22/22 147 lb (66.7 kg)   06/17/22 149 lb (67.6 kg)   05/17/22 148 lb (67.1 kg)       Physical Exam        Assessment/Plan   Diagnoses and all orders for this visit:    Pure hypercholesterolemia    Acquired hypothyroidism    Malignant neoplasm of upper-outer quadrant of right breast in female, estrogen receptor positive ([facility]    Age-related osteoporosis without current pathological fracture    Onychomycosis of toenail    History of tobacco use    Use of anastrozole (Arimidex)    S/P lumpectomy, right breast    Atypical lobular hyperplasia (ALH) of left breast          Reccommended tobacco cessation options including pharmacologicmethods, counseled great than 3 minutes during this visit:  Yes  []  No  []    Patient given educational materials - see patient instructions. Discussed use, benefit, and side effectsof prescribed medications. All patient questions answered. Pt voiced understanding. Reviewed health maintenance. Instructed to continue current medications, diet and exercise. Patient agreed with treatment plan. Followup as directed.      Electronically signed by Kourtney Donald MD

## 2022-07-27 ENCOUNTER — HOSPITAL ENCOUNTER (OUTPATIENT)
Age: 72
Discharge: HOME | End: 2022-07-27
Payer: MEDICARE

## 2022-07-27 ENCOUNTER — HOSPITAL ENCOUNTER (OUTPATIENT)
Dept: GENERAL RADIOLOGY | Age: 72
Discharge: HOME | End: 2022-07-27
Payer: MEDICARE

## 2022-07-27 ENCOUNTER — OFFICE VISIT (OUTPATIENT)
Dept: FAMILY MEDICINE CLINIC | Age: 72
End: 2022-07-27
Payer: MEDICARE

## 2022-07-27 VITALS
RESPIRATION RATE: 16 BRPM | SYSTOLIC BLOOD PRESSURE: 134 MMHG | HEIGHT: 64 IN | BODY MASS INDEX: 25.27 KG/M2 | WEIGHT: 148 LBS | OXYGEN SATURATION: 98 % | HEART RATE: 73 BPM | DIASTOLIC BLOOD PRESSURE: 64 MMHG | TEMPERATURE: 97.8 F

## 2022-07-27 DIAGNOSIS — M54.50 LUMBAR PAIN: ICD-10-CM

## 2022-07-27 DIAGNOSIS — C50.411 MALIGNANT NEOPLASM OF UPPER-OUTER QUADRANT OF RIGHT BREAST IN FEMALE, ESTROGEN RECEPTOR POSITIVE (HCC): ICD-10-CM

## 2022-07-27 DIAGNOSIS — M25.552 HIP PAIN, BILATERAL: ICD-10-CM

## 2022-07-27 DIAGNOSIS — M25.551 HIP PAIN, BILATERAL: ICD-10-CM

## 2022-07-27 DIAGNOSIS — Z00.00 MEDICARE ANNUAL WELLNESS VISIT, SUBSEQUENT: Primary | ICD-10-CM

## 2022-07-27 DIAGNOSIS — E78.00 PURE HYPERCHOLESTEROLEMIA: ICD-10-CM

## 2022-07-27 DIAGNOSIS — M81.0 AGE-RELATED OSTEOPOROSIS WITHOUT CURRENT PATHOLOGICAL FRACTURE: ICD-10-CM

## 2022-07-27 DIAGNOSIS — Z79.811 USE OF ANASTROZOLE (ARIMIDEX): ICD-10-CM

## 2022-07-27 DIAGNOSIS — B35.1 ONYCHOMYCOSIS OF TOENAIL: ICD-10-CM

## 2022-07-27 DIAGNOSIS — Z98.890 S/P LUMPECTOMY, RIGHT BREAST: ICD-10-CM

## 2022-07-27 DIAGNOSIS — Z17.0 MALIGNANT NEOPLASM OF UPPER-OUTER QUADRANT OF RIGHT BREAST IN FEMALE, ESTROGEN RECEPTOR POSITIVE (HCC): ICD-10-CM

## 2022-07-27 DIAGNOSIS — E03.9 ACQUIRED HYPOTHYROIDISM: ICD-10-CM

## 2022-07-27 DIAGNOSIS — N60.92 ATYPICAL LOBULAR HYPERPLASIA (ALH) OF LEFT BREAST: ICD-10-CM

## 2022-07-27 DIAGNOSIS — Z87.891 HISTORY OF TOBACCO USE: ICD-10-CM

## 2022-07-27 PROCEDURE — G0439 PPPS, SUBSEQ VISIT: HCPCS | Performed by: FAMILY MEDICINE

## 2022-07-27 PROCEDURE — 3017F COLORECTAL CA SCREEN DOC REV: CPT | Performed by: FAMILY MEDICINE

## 2022-07-27 PROCEDURE — 72100 X-RAY EXAM L-S SPINE 2/3 VWS: CPT

## 2022-07-27 PROCEDURE — 73502 X-RAY EXAM HIP UNI 2-3 VIEWS: CPT

## 2022-07-27 PROCEDURE — 1123F ACP DISCUSS/DSCN MKR DOCD: CPT | Performed by: FAMILY MEDICINE

## 2022-07-27 RX ORDER — UBIQUINOL 100 MG
CAPSULE ORAL 2 TIMES DAILY
COMMUNITY

## 2022-07-27 SDOH — ECONOMIC STABILITY: FOOD INSECURITY: WITHIN THE PAST 12 MONTHS, YOU WORRIED THAT YOUR FOOD WOULD RUN OUT BEFORE YOU GOT MONEY TO BUY MORE.: NEVER TRUE

## 2022-07-27 SDOH — ECONOMIC STABILITY: FOOD INSECURITY: WITHIN THE PAST 12 MONTHS, THE FOOD YOU BOUGHT JUST DIDN'T LAST AND YOU DIDN'T HAVE MONEY TO GET MORE.: NEVER TRUE

## 2022-07-27 ASSESSMENT — SOCIAL DETERMINANTS OF HEALTH (SDOH): HOW HARD IS IT FOR YOU TO PAY FOR THE VERY BASICS LIKE FOOD, HOUSING, MEDICAL CARE, AND HEATING?: NOT HARD AT ALL

## 2022-07-27 ASSESSMENT — PATIENT HEALTH QUESTIONNAIRE - PHQ9
SUM OF ALL RESPONSES TO PHQ QUESTIONS 1-9: 0
SUM OF ALL RESPONSES TO PHQ QUESTIONS 1-9: 0
SUM OF ALL RESPONSES TO PHQ9 QUESTIONS 1 & 2: 0
SUM OF ALL RESPONSES TO PHQ QUESTIONS 1-9: 0
1. LITTLE INTEREST OR PLEASURE IN DOING THINGS: 0
SUM OF ALL RESPONSES TO PHQ QUESTIONS 1-9: 0
2. FEELING DOWN, DEPRESSED OR HOPELESS: 0

## 2022-07-27 ASSESSMENT — LIFESTYLE VARIABLES
HOW OFTEN DO YOU HAVE A DRINK CONTAINING ALCOHOL: NEVER
HOW MANY STANDARD DRINKS CONTAINING ALCOHOL DO YOU HAVE ON A TYPICAL DAY: PATIENT DOES NOT DRINK

## 2022-07-27 NOTE — PATIENT INSTRUCTIONS
having pain, losing your independence, or being kept alive by machines.)  Where would you prefer to die? (Your home? A hospital? A nursing home?)  Do you want to donate your organs when you die? Do you want certain Jainism practices performed before you die? When should you call for help? Be sure to contact your doctor if you have any questions. Where can you learn more? Go to [email or web address] org and sign in to your Personal Estate Manager account. Enter R264 in the KupiBonus box to learn more about \"Advance Directives: Care Instructions. \"     If you do not have an account, please click on the \"Sign Up Now\" link. Current as of: October 18, 2021               Content Version: 13.3  © 2006-2022 Healthwise, Incorporated. Care instructions adapted under license by [facility] ([facility]). If you have questions about a medical condition or this instruction, always ask your healthcare professional. [place] [address] any warranty or liability for your use of this information. Learning About Living Perroy  What is a living will? A living will, also called a declaration, is a legal form. It tells your family and your doctor your wishes when you can't speak for yourself. It's used by the health professionals who will treat you as you near the end of your life or ifyou get seriously hurt or ill. If you put your wishes in writing, your loved ones and others will know what kind of care you want. They won't need to guess. This can ease your mind and behelpful to others. And you can change or cancel your living will at any time. A living will is not the same as an estate or property will. An estate willexplains what you want to happen with your money and property after you die. How do you use it? Keep these facts in mind about how a living will is used. Your living will is used only if you can't speak or make decisions for yourself.  Most often, one or more doctors must certify that you can't speak or decide for yourself before your living will takes effect. If you get better and can speak for yourself again, you can accept or refuse any treatment. It doesn't matter what you said in your living will. Some states may limit your right to refuse treatment in certain cases. For example, you may need to clearly state in your living will that you don't want artificial hydration and nutrition, such as being fed through a tube. Is a living will a legal document? A living will is a legal document. Each state has its own laws about livingwills. And a living will may be called something else in your state. Here are some things to know about living gotti. You don't need an  to complete a living will. But legal advice can be helpful if your state's laws are unclear. It can also help if your health history is complicated or your family can't agree on what should be in your living will. You can change your living will at any time. Some people find that their wishes about end-of-life care change as their health changes. If you make big changes to your living will, complete a new form. If you move to another state, make sure that your living will is legal in the state where you now live. In most cases, doctors will respect your wishes even if you have a form from a different state. You might use a universal form that has been approved by many states. This kind of form can sometimes be filled out and stored online. Your digital copy will then be available wherever you have a connection to the internet. The doctors and nurses who need to treat you can find it right away. Your state may offer an online registry. This is another place where you can store your living will online. It's a good idea to get your living will notarized. This means using a person called a  to watch two people sign, or witness, your living will. What should you know when you create a living will?   Here are some questions to ask yourself as you make your living will. Do you know enough about life support methods that might be used? If not, talk to your doctor so you know what might be done if you can't breathe on your own, your heart stops, or you can't swallow. What things would you still want to be able to do after you receive life-support methods? Would you want to be able to walk? To speak? To eat on your own? To live without the help of machines? Do you want certain Muslim practices performed if you become very ill? If you have a choice, where do you want to be cared for? In your home? At a hospital or nursing home? If you have a choice at the end of your life, where would you prefer to die? At home? In a hospital or nursing home? Somewhere else? Would you prefer to be buried or cremated? Do you want your organs to be donated after you die? What should you do with your living will? Make sure that your family members and your health care agent have copies of your living will (also called a declaration). Give your doctor a copy of your living will. Ask to have it kept as part of your medical record. If you have more than one doctor, make sure that each one has a copy. Put a copy of your living will where it can be easily found. For example, some people may put a copy on their refrigerator door. If you are using a digital copy, be sure your doctor, family members, and health care agent know how to find and access it. Where can you learn more? Go to https://Dianwobayobani.Mobui. org and sign in to your GenAudio account. Enter J339 in the Curtume ErÃª box to learn more about \"Learning About Living Perroy. \"     If you do not have an account, please click on the \"Sign Up Now\" link. Current as of: October 18, 2021               Content Version: 13.3  © 1740-1765 Healthwise, Incorporated. Care instructions adapted under license by [facility] ([facility]).  If you have questions about a medical condition or this instruction, always ask your healthcare professional. [place] [address] any warranty or liability for your use of this information. Personalized Preventive Plan for Amanda Solorio - 7/27/2022  Medicare offers a range of preventive health benefits. Some of the tests and screenings are paid in full while other may be subject to a deductible, co-insurance, and/or copay. Some of these benefits include a comprehensive review of your medical history including lifestyle, illnesses that may run in your family, and various assessments and screenings as appropriate. After reviewing your medical record and screening and assessments performed today your provider may have ordered immunizations, labs, imaging, and/or referrals for you. A list of these orders (if applicable) as well as your Preventive Care list are included within your After Visit Summary for your review. Other Preventive Recommendations:    A preventive eye exam performed by an eye specialist is recommended every 1-2 years to screen for glaucoma; cataracts, macular degeneration, and other eye disorders. A preventive dental visit is recommended every 6 months. Try to get at least 150 minutes of exercise per week or 10,000 steps per day on a pedometer . Order or download the FREE \"Exercise & Physical Activity: Your Everyday Guide\" from [employer]. Call 3-759.613.4366 or search [employer] online. You need 8373-9010 mg of calcium and 6841-3449 IU of vitamin D per day. It is possible to meet your calcium requirement with diet alone, but a vitamin D supplement is usually necessary to meet this goal.  When exposed to the sun, use a sunscreen that protects against both UVA and UVB radiation with an SPF of 30 or greater. Reapply every 2 to 3 hours or after sweating, drying off with a towel, or swimming. Always wear a seat belt when traveling in a car.  Always wear a helmet when riding a bicycle or motorcycle.

## 2022-07-27 NOTE — PROGRESS NOTES
Medicare Annual Wellness Visit    Jordan Richmond is here for Medicare AWV    Assessment & Plan   Medicare annual wellness visit, subsequent  Pure hypercholesterolemia  Acquired hypothyroidism  Malignant neoplasm of upper-outer quadrant of right breast in female, estrogen receptor positive ([facility] [address]  Age-related osteoporosis without current pathological fracture  Onychomycosis of toenail  History of tobacco use  Use of anastrozole (Arimidex)  S/P lumpectomy, right breast  Atypical lobular hyperplasia (ALH) of left breast  Hip pain, bilateral  -     XR HIP RIGHT (2-3 VIEWS); Future  -     XR HIP LEFT (2-3 VIEWS); Future  -     XR LUMBAR SPINE (2-3 VIEWS); Future  -     External Referral To Physical Therapy  Lumbar pain  -     XR HIP RIGHT (2-3 VIEWS); Future  -     External Referral To Physical Therapy    Recommendations for Preventive Services Due: see orders and patient instructions/AVS.  Recommended screening schedule for the next 5-10 years is provided to the patient in written form: see Patient Instructions/AVS.     Return in 6 months (on 1/27/2023) for Medicare Annual Wellness Visit in 1 year. Subjective       Well Adult Physical: Patient here for a comprehensive physical exam.The patient reports problems - cholesterol and thyroid issues are controlled on current medications. Dexa scan in 2015 shows osteoporosis. Do you take any herbs or supplements that were not prescribed by a doctor? no Are you taking calcium supplements? yes Are you taking aspirin daily? yes   History:  Any STD's in the past? none    In  she was diagnosed with a mass in her breast and had a core needle biopsy which was suspicious and subsequent lumpectomy which showed no cancer. She has a strong family history of breast cancer so she had a breast MRI in July 2020. She was found to have an abnormal mass in the right breast and had a lumpectomy that showed an invasive ductal carcinoma.    She is still seeing oncology and surgery for follow up. Back and hip pain worse in the spring since getting back on her feet. No metastatic disease on bone scan, osteopenia on dexa. Back on arimidex and prolia. The following acute and/or chronic problems were also addressed today:  Se above    Patient's complete Health Risk Assessment and screening values have been reviewed and are found in Flowsheets. The following problems were reviewed today and where indicated follow up appointments were made and/or referrals ordered. Positive Risk Factor Screenings with Interventions:             General Health and ACP:  General  In general, how would you say your health is?: Excellent  In the past 7 days, have you experienced any of the following: New or Increased Pain, New or Increased Fatigue, Loneliness, Social Isolation, Stress or Anger?: No  Do you get the social and emotional support that you need?: Yes  Do you have a Living Will?: Yes    Advance Directives       Power of  Living Will ACP-Advance Directive ACP-Power of     Not on File Not on File Not on File Not on File        General Health Risk Interventions:  No Living Will: patient to bring in copy              Objective   Vitals:    07/27/22 1316   BP: 134/64   Site: Left Upper Arm   Position: Sitting   Cuff Size: Medium Adult   Pulse: 73   Resp: 16   Temp: 97.8 °F (36.6 °C)   TempSrc: Temporal   SpO2: 98%   Weight: 148 lb (67.1 kg)   Height: 5' 4.02\" (1.626 m)      Body mass index is 25.39 kg/m². Physical Exam   Constitutional: Vital signs are normal. She appears well-developed and well-nourished. She is active. HENT:   Head: Normocephalic and atraumatic. Right Ear: Tympanic membrane, external ear and ear canal normal. No drainage or tenderness. Left Ear: Tympanic membrane, external ear and ear canal normal. No drainage or tenderness. Nose: Nose normal. No mucosal edema or rhinorrhea.    Mouth/Throat: Uvula is midline, oropharynx is clear and moist and mucous membranes are normal. Mucous membranes are not pale. Normal dentition. No posterior oropharyngeal edema or posterior oropharyngeal erythema. Eyes: Lids are normal. Right eye exhibits no chemosis and no discharge. Left eye exhibits no chemosis and no drainage. Right conjunctiva has no hemorrhage. Left conjunctiva has no hemorrhage. Right eye exhibits normal extraocular motion. Left eye exhibits normal extraocular motion. Right pupil is round and reactive. Left pupil is round and reactive. Pupils are equal.   Cardiovascular: Normal rate, regular rhythm, S1 normal, S2 normal and normal heart sounds. Exam reveals no gallop. No murmur heard. Pulmonary/Chest: Effort normal and breath sounds normal. No respiratory distress. She has no wheezes. She has no rhonchi. She has no rales. Abdominal: Soft. Normal appearance and bowel sounds are normal. She exhibits no distension and no mass. There is no hepatosplenomegaly. No tenderness. She has no rigidity, no rebound and no guarding. No hernia. Musculoskeletal:        Right lower leg: She exhibits no edema. Left lower leg: She exhibits no edema. Neurological: She is alert. No Known Allergies  Prior to Visit Medications    Medication Sig Taking?  Authorizing Provider   Misc Natural Products (GLUCOSAMINE CHOND CMP ADVANCED PO) Take by mouth Yes Historical Provider, MD   Glucosamine 750 MG TABS Take by mouth in the morning and at bedtime Yes Historical Provider, MD   anastrozole (ARIMIDEX) 1 MG tablet TAKE 1 TABLET EVERY DAY Yes Lucio Ferrer MD   atorvastatin (LIPITOR) 20 MG tablet TAKE 1 TABLET Savana Reyes MD   Calcium-Magnesium-Vitamin D (CITRACAL CALCIUM+D PO) Take by mouth 2 times daily Yes Historical Provider, MD   Ubiquinol 100 MG CAPS Take by mouth daily Yes Historical Provider, MD   aspirin EC 81 MG EC tablet Take 1 tablet by mouth daily Yes Fei Zavala MD   Cholecalciferol (VITAMIN D3) 50 MCG (2000 UT) CAPS Take by mouth daily  Patient not taking: Reported on 7/7/2021  Historical Provider, MD [place] (Including outside providers/suppliers regularly involved in providing care):   Patient Care Team:  Dennie Fennel, MD as PCP - Raúl Kapoor MD as PCP - [facility] Empaneled Provider     Reviewed and updated this visit:  Tobacco  Allergies  Meds  Med Hx  Surg Hx  Soc Hx  Fam Hx

## 2022-08-09 ENCOUNTER — HOSPITAL ENCOUNTER (OUTPATIENT)
Dept: PHYSICAL THERAPY | Age: 72
Setting detail: THERAPIES SERIES
Discharge: HOME | End: 2022-08-09
Payer: MEDICARE

## 2022-08-09 PROCEDURE — 97161 PT EVAL LOW COMPLEX 20 MIN: CPT

## 2022-08-09 PROCEDURE — 97140 MANUAL THERAPY 1/> REGIONS: CPT

## 2022-08-09 NOTE — PROGRESS NOTES
facility for 2-3 months when they start back up in the Spring and works 7-8 hour days after having not been active.  notices issues then. Reports also has osteopenia and wants to know what to do to help with that past what she is already doing with medication. States was seeing a chiropractor and would always be sore the day after. Social/Functional History and Current Status:  Medications and Allergies have been reviewed and are listed on Medical History Questionnaire. Clyde Calderon lives alone in a single story home with  One step into house. 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 Patient with right on left sacral torsion   Posture Fair       Range of Motion WFL with lumbar. Tight HS and piriformis   Strength Bilateral hips 4/5 and rest of legs 4+-5/5. Moderate core weakness    Coordination    Sensation Gets pain that goes down the side of her right leg to her shin.    Bed Mobility    Transfers    Ambulation Fairly normal gait pattern, slightly uneven steps   Stairs Reports has pain in bilat hips with going up stairs   Balance Slightly unsteady but no falls   Special Tests          TREATMENT   Precautions: None   Pain: None    \"X in shaded column indicates activity completed today    *\" next to exercise/intervention indicates progression   Modalities Parameters/  Location  Notes                     Manual Therapy Time/Technique  Notes   Muscle energy to treat right on left sacral torsion  X Patient with even sacral motion after treatment and reported feeling a little better               Exercise/  Intervention   Notes   Supine piriformis stretch and standing IT band stretch   X                                                                            Specific Interventions Next Treatment: manual therapy, posture education, osteoporosis education, core and leg strengthening, unloading as needed for radicular pain, modalities as needed    Activity/Treatment Tolerance:  [x]  Patient tolerated treatment well  []  Patient limited by fatigue  []  Patient limited by pain   []  Patient limited by medical complications  []  Other:     Assessment: Patient with history of back and hip issues that are not causing her constant pain but are flaring up on her from time to time and are starting to get a little worse. Patient is trying some therapy first to see if can help before seeing if needs anything further done. Patient also has osteopenia that needs addressed and she needs further education on how to decrease stress on her spine and hips and what exercises are good to do. Patient needs therapy to educate her on postural corrections with daily activity to decrease stress on her spine. Patient would also benefit from therapy to learn what stretches and strengthening can do just for overall mobility and stability with daily activity. Patient needs a HEP to continue with through the winter so is not so sore when works in the Spring.    Body Structures/Functions/Activity Limitations: impaired activity tolerance, impaired balance, impaired endurance, impaired ROM, impaired strength, pain, abnormal gait, and abnormal posture  Prognosis: good    GOALS:  Patient Goal: To learn what to do to have less soreness and be able to be more active    Short Term Goals:  Time Frame: 4 weeks  1) Patient to report 25-50% decrease in soreness/stiffness for ease with going up stairs with reciprocal pattern  2) Patient to demonstrate full lumbar and piriformis range without pain or tightness for ease with dressing  3) Patient to report able to consistently decrease right leg pain for ease with long distance walking  4) Patient to be fully educated on postural and body mechanics corrections to decrease stress on spine and hips   5) Patient to start and progress strength program for general strengthening and for her osteopenia to help provide stability for her hips and back with daily activity and lifting      Long Term Goals:  Time Frame: 12 weeks  1) Patient to be independent with home program to perform all daily actiivty with less difficulty      Patient Education:   [x]  HEP/Education Completed: Plan of Care, Goals, HEP above. See how feels after muscle energy  Medbridge Access Code:  []  No new Education completed  []  Reviewed Prior HEP      [x]  Patient verbalized and/or demonstrated understanding of education provided. []  Patient unable to verbalize and/or demonstrate understanding of education provided. Will continue education. []  Barriers to learning: None    PLAN:  Treatment Recommendations: Strengthening, Range of Motion, Functional Mobility Training, Endurance Training, Gait Training, Stair Training, Neuromuscular Re-education, Manual Therapy - Soft Tissue Mobilization, Pain Management, Home Exercise Program, Patient Education, Aquatics, and Modalities    [x]  Plan of care initiated. Plan to see patient 2 times per week for 12 weeks to address the treatment planned outlined above.   []  Continue with current plan of care  []  Modify plan of care as follows:    []  Hold pending physician visit  []  Discharge    Time In 0900   Time Out 1000   Timed Code Minutes: 8 min   Total Treatment Time: 60 min       Electronically Signed by: [place] [id number]

## 2022-08-11 ENCOUNTER — HOSPITAL ENCOUNTER (OUTPATIENT)
Dept: PHYSICAL THERAPY | Age: 72
Setting detail: THERAPIES SERIES
Discharge: HOME | End: 2022-08-11
Payer: MEDICARE

## 2022-08-11 PROCEDURE — 97110 THERAPEUTIC EXERCISES: CPT

## 2022-08-11 PROCEDURE — 97530 THERAPEUTIC ACTIVITIES: CPT

## 2022-08-11 NOTE — PROGRESS NOTES
[address] [facility]  PHYSICAL THERAPY  [] EVALUATION  [x] DAILY NOTE (LAND) [] DAILY NOTE (AQUATIC ) [] PROGRESS NOTE [] DISCHARGE NOTE    [] [facility]   [] [address]    [x] [facility]   [] Barry Bass    Date: 2022  Patient Name:  Robert Millard  : [id number]  MRN: [id number]  CSN: [id number]    Referring Practitioner Loria Castleman, MD   Diagnosis Pain in right hip [M25.551]  Pain in left hip [M25.552]  Low back pain, unspecified [M54.50]    Treatment Diagnosis Bilat hip pain and low back pain   Date of Evaluation 22    Additional Pertinent History Arthritis, Osteopenia      Functional Outcome Measure Used Mod Oswestry   Functional Outcome Score 4 (22)       Insurance: Primary: Payor: Morenita Hicks /  /  / ,   Secondary: BCBS   Authorization Information: Unlimited visits. Aquatics and modalities except Ionto and HP/CP covered. Visit # 2, 2/10 for progress note   Visits Allowed: Unlimited   Recertification Date: None   Physician Follow-Up: 22   Physician Orders:    History of Present Illness:      SUBJECTIVE: Patient reports is having some discomfort in left hip today. States tried doing the stretches at home and not sure if overdid it or if she just moved the wrong way. States has some questions today about things. TREATMENT   Precautions: None   Pain: None    \"X in shaded column indicates activity completed today    *\" next to exercise/intervention indicates progression   Modalities Parameters/  Location  Notes                     Manual Therapy Time/Technique  Notes   No sacral torsion today so no muscle energy  X                Exercise/  Intervention   Notes   Supine piriformis stretch and standing IT band stretch   X Showed IT band stretch in supine figure four and same in sitting that can try if needed.  Also showed how to angle leg differently with supine piriformis stretch   Went through osteoporosis handouts and showed proper bed mobility and sit to stand. X    Educated patient on x-ray results and what they mean and how therapy can help or who she needs to talk to next   X    Patient with slightly flat feet with left side more than right so discussed orthotics and types of shoes should look for. Patient with fairly flexible feet so needs a shoe with a little more stability. X    Started abdominal bracing   X                                                Specific Interventions Next Treatment: manual therapy, posture education, osteoporosis education, core and leg strengthening, unloading as needed for radicular pain, modalities as needed    Activity/Treatment Tolerance:  [x]  Patient tolerated treatment well  []  Patient limited by fatigue  []  Patient limited by pain   []  Patient limited by medical complications  []  Other:     Assessment: Patient with a lot of questions today regarding lot so different things in regards to her low back, her hips and her feet. Patient's hip may be sore due to start of stretching but need to make sure if not putting in a position that could be irritating where she has spurring. Patient may benefit from seeing a podiatrist for her feet and looking in to some different shoes. Patient would benefit from starting a strength program next session along with continuing to work on posture education.     GOALS:  Patient Goal: To learn what to do to have less soreness and be able to be more active    Short Term Goals:  Time Frame: 4 weeks  1) Patient to report 25-50% decrease in soreness/stiffness for ease with going up stairs with reciprocal pattern  2) Patient to demonstrate full lumbar and piriformis range without pain or tightness for ease with dressing  3) Patient to report able to consistently decrease right leg pain for ease with long distance walking  4) Patient to be fully educated on postural and body mechanics corrections to decrease stress on spine and hips   5) Patient to start and progress strength program for general strengthening and for her osteopenia to help provide stability for her hips and back with daily activity and lifting      Long Term Goals:  Time Frame: 12 weeks  1) Patient to be independent with home program to perform all daily actiivty with less difficulty      Patient Education:   [x]  HEP/Education Completed: Continue with stretches. Start abdominal bracing. Start working on body mechanics changes with daily activity. N42 Access Code:  []  No new Education completed  []  Reviewed Prior HEP      [x]  Patient verbalized and/or demonstrated understanding of education provided. []  Patient unable to verbalize and/or demonstrate understanding of education provided. Will continue education. []  Barriers to learning: None    PLAN:  []  Plan of care initiated. Plan to see patient 2 times per week for 12 weeks to address the treatment planned outlined above.   [x]  Continue with current plan of care  []  Modify plan of care as follows:    []  Hold pending physician visit  []  Discharge    Time In 0830   Time Out 0915   Timed Code Minutes: 45 min   Total Treatment Time: 45 min       Electronically Signed by: [place] [id number]

## 2022-08-16 ENCOUNTER — HOSPITAL ENCOUNTER (OUTPATIENT)
Dept: PHYSICAL THERAPY | Age: 72
Setting detail: THERAPIES SERIES
Discharge: HOME | End: 2022-08-16
Payer: MEDICARE

## 2022-08-16 PROCEDURE — 97110 THERAPEUTIC EXERCISES: CPT

## 2022-08-16 NOTE — PROGRESS NOTES
[address] [facility]  PHYSICAL THERAPY  [] EVALUATION  [x] DAILY NOTE (LAND) [] DAILY NOTE (AQUATIC ) [] PROGRESS NOTE [] DISCHARGE NOTE    [] [address] [place] [address]   [] [address]    [x] [facility]   [] Jan Tena    Date: 2022  Patient Name:  Tomas Villanueva  : [birth year]  MRN: [id number]  CSN: [id number]    Referring Practitioner Alejandra Mauricio MD   Diagnosis Pain in right hip [M25.551]  Pain in left hip [M25.552]  Low back pain, unspecified [M54.50]    Treatment Diagnosis Bilat hip pain and low back pain   Date of Evaluation 22    Additional Pertinent History Arthritis, Osteopenia      Functional Outcome Measure Used Mod Oswestry   Functional Outcome Score 4 (22)       Insurance: Primary: Payor: Troy Uribe /  /  / ,   Secondary: BCBS   Authorization Information: Unlimited visits. Aquatics and modalities except Ionto and HP/CP covered. Visit # 3, 3/10 for progress note   Visits Allowed: Unlimited   Recertification Date: None   Physician Follow-Up: 22   Physician Orders:    History of Present Illness:      SUBJECTIVE: Patient reports had to do a lot of shopping yesterday and so did not sleep well last night. States still has trouble with first standing after sitting and first steps are very stiff but then is better once gets moving. States is doing HEP and using techniques to decrease stress on the spine. Reports got new shoes from [address] and they were able to assess her feet and help her get good shoes and sandals.     TREATMENT   Precautions: None   Pain: None    \"X in shaded column indicates activity completed today    *\" next to exercise/intervention indicates progression   Modalities Parameters/  Location  Notes                     Manual Therapy Time/Technique  Notes   No sacral torsion today so no muscle energy                  Exercise/  Intervention   Notes   Supine piriformis stretch and standing IT band stretch    Showed IT band stretch in supine figure four and same in sitting that can try if needed. Also showed how to angle leg differently with supine piriformis stretch   Went through osteoporosis handouts and showed proper bed mobility and sit to stand. Seated: abdominal bracing                Marching                LAQ  With ball behind back:                Thoracic extension                Horz abd                Shoulder flex above head 10x  10x  10x    10x  10x  10x 5 sec X  X  X  X  X  X  X    Sci-Fit 5 min Level 2 X    Posture against wall   X Education on getting shoulders back first instead of tipping head back to make it touch the wall   Showed how to use towel roll to lay on with it along spine for thoracic stretch   X    Pec stretch in doorway 3x 15 sec X                                  Specific Interventions Next Treatment: manual therapy, posture education, osteoporosis education, core and leg strengthening, unloading as needed for radicular pain, modalities as needed    Activity/Treatment Tolerance:  [x]  Patient tolerated treatment well  []  Patient limited by fatigue  []  Patient limited by pain   []  Patient limited by medical complications  []  Other:     Assessment: Patient ready to start strength program since has been educated on posture and body mechanics corrections for daily activity and has good shoes for  her feet. Patient needing cueing for posture and body mechanics with start of strengthening. Patient reported feeling good after session and left hip pain was even better. Will continue to progress strength and balance program while educating for posture.     GOALS:  Patient Goal: To learn what to do to have less soreness and be able to be more active    Short Term Goals:  Time Frame: 4 weeks  1) Patient to report 25-50% decrease in soreness/stiffness for ease with going up stairs with reciprocal pattern  2) Patient to demonstrate full lumbar and piriformis range without pain or tightness for ease with dressing  3) Patient to report able to consistently decrease right leg pain for ease with long distance walking  4) Patient to be fully educated on postural and body mechanics corrections to decrease stress on spine and hips   5) Patient to start and progress strength program for general strengthening and for her osteopenia to help provide stability for her hips and back with daily activity and lifting      Long Term Goals:  Time Frame: 12 weeks  1) Patient to be independent with home program to perform all daily actiivty with less difficulty      Patient Education:   [x]  HEP/Education Completed: Continue with stretches. Start new HEP above. Continue to work on posture and body mechanics changes. PK Clean Access Code:  []  No new Education completed  [x]  Reviewed Prior HEP      [x]  Patient verbalized and/or demonstrated understanding of education provided. []  Patient unable to verbalize and/or demonstrate understanding of education provided. Will continue education. []  Barriers to learning: None    PLAN:  []  Plan of care initiated. Plan to see patient 2 times per week for 12 weeks to address the treatment planned outlined above.   [x]  Continue with current plan of care  []  Modify plan of care as follows:    []  Hold pending physician visit  []  Discharge    Time In 0915   Time Out 1000   Timed Code Minutes: 45 min   Total Treatment Time: 45 min       Electronically Signed by: [place] [id number]

## 2022-08-18 ENCOUNTER — HOSPITAL ENCOUNTER (OUTPATIENT)
Dept: PHYSICAL THERAPY | Age: 72
Setting detail: THERAPIES SERIES
Discharge: HOME | End: 2022-08-18
Payer: MEDICARE

## 2022-08-18 PROCEDURE — 97110 THERAPEUTIC EXERCISES: CPT

## 2022-08-18 PROCEDURE — 97140 MANUAL THERAPY 1/> REGIONS: CPT

## 2022-08-18 NOTE — PROGRESS NOTES
[address] [facility]  PHYSICAL THERAPY  [] EVALUATION  [x] DAILY NOTE (LAND) [] DAILY NOTE (AQUATIC ) [] PROGRESS NOTE [] DISCHARGE NOTE    [] [facility]   [] [address]    [x] [facility]   [] Nico Moreno    Date: 2022  Patient Name:  Brody Vasquez  : [birth year]  MRN: [id number]  CSN: [id number]    Referring Practitioner Marciano Beltran MD   Diagnosis Pain in right hip [M25.551]  Pain in left hip [M25.552]  Low back pain, unspecified [M54.50]    Treatment Diagnosis Bilat hip pain and low back pain   Date of Evaluation 22    Additional Pertinent History Arthritis, Osteopenia      Functional Outcome Measure Used Mod Oswestry   Functional Outcome Score 4 (22)       Insurance: Primary: Payor: Norvin Angelucci /  /  / ,   Secondary: BCBS   Authorization Information: Unlimited visits. Aquatics and modalities except Ionto and HP/CP covered. Visit # 4, 4/10 for progress note   Visits Allowed: Unlimited   Recertification Date: None   Physician Follow-Up: 22   Physician Orders:    History of Present Illness:      SUBJECTIVE: Patient reports is doing well. States still having some soreness in left hip with standing from sitting and with first few steps. States also still pain in hips with stairs. States doing good with all exercises and feels is doing a little better.     TREATMENT   Precautions: None   Pain: None    \"X in shaded column indicates activity completed today    *\" next to exercise/intervention indicates progression   Modalities Parameters/  Location  Notes                     Manual Therapy Time/Technique  Notes   No sacral torsion today so no muscle energy      Psoas release to bilat sides at 90 and 45 deg angles  X Tightness noted more to right but was tight and painful bilat         Exercise/  Intervention   Notes   Supine piriformis stretch and standing IT band stretch        Showed supine, seated and standing psoas stretches             X Showed IT band stretch in supine figure four and same in sitting that can try if needed. Also showed how to angle leg differently with supine piriformis stretch   Went through osteoporosis handouts and showed proper bed mobility and sit to stand. Seated: abdominal bracing                Marching                LAQ  With ball behind back:                Thoracic extension                Horz abd                Shoulder flex above head 10x  10x  10x    10x  10x  10x 5 sec                 Sci-Fit 5 min Level 2 X    Posture against wall    Education on getting shoulders back first instead of tipping head back to make it touch the wall   Showed how to use towel roll to lay on with it along spine for thoracic stretch       Pec stretch in doorway 3x 15 sec                                   Specific Interventions Next Treatment: manual therapy, posture education, osteoporosis education, core and leg strengthening, unloading as needed for radicular pain, modalities as needed    Activity/Treatment Tolerance:  [x]  Patient tolerated treatment well  []  Patient limited by fatigue  []  Patient limited by pain   []  Patient limited by medical complications  []  Other:     Assessment: Patient still having pain with sit to stand and first few steps so assessed psoas today. Patient found to have tightness bilaterally, more on right, that could be getting tighter as she sits and then is painful when she first stands and stretches and tries to walk. Spent time doing manual therapy on her today to help loosen up the tension and taught stretches to help her continue to work on tightness at home. Patient had a little less pain standing and walking after treatment. Will continue to monitor if helping with pain and then continue to progress posture and strength program as can tolerate.     GOALS:  Patient Goal: To learn what to do to have less soreness and be able to be more active    Short Term Goals:  Time Frame: 4 weeks  1) Patient to report 25-50% decrease in soreness/stiffness for ease with going up stairs with reciprocal pattern  2) Patient to demonstrate full lumbar and piriformis range without pain or tightness for ease with dressing  3) Patient to report able to consistently decrease right leg pain for ease with long distance walking  4) Patient to be fully educated on postural and body mechanics corrections to decrease stress on spine and hips   5) Patient to start and progress strength program for general strengthening and for her osteopenia to help provide stability for her hips and back with daily activity and lifting      Long Term Goals:  Time Frame: 12 weeks  1) Patient to be independent with home program to perform all daily actiivty with less difficulty      Patient Education:   [x]  HEP/Education Completed: Continue with HEP above. Add in new stretch. Monitor how feels after manual therapy today. [email or web address] Access Code:  []  No new Education completed  [x]  Reviewed Prior HEP      [x]  Patient verbalized and/or demonstrated understanding of education provided. []  Patient unable to verbalize and/or demonstrate understanding of education provided. Will continue education. []  Barriers to learning: None    PLAN:  []  Plan of care initiated. Plan to see patient 2 times per week for 12 weeks to address the treatment planned outlined above.   [x]  Continue with current plan of care  []  Modify plan of care as follows:    []  Hold pending physician visit  []  Discharge    Time In 0915   Time Out 1000   Timed Code Minutes: 45 min   Total Treatment Time: 45 min       Electronically Signed by: [place] [id number]

## 2022-08-23 ENCOUNTER — HOSPITAL ENCOUNTER (OUTPATIENT)
Dept: PHYSICAL THERAPY | Age: 72
Setting detail: THERAPIES SERIES
Discharge: HOME | End: 2022-08-23
Payer: MEDICARE

## 2022-08-23 PROCEDURE — 97110 THERAPEUTIC EXERCISES: CPT

## 2022-08-23 PROCEDURE — 97140 MANUAL THERAPY 1/> REGIONS: CPT

## 2022-08-23 NOTE — PROGRESS NOTES
[address] [facility]  PHYSICAL THERAPY  [] EVALUATION  [x] DAILY NOTE (LAND) [] DAILY NOTE (AQUATIC ) [] PROGRESS NOTE [] DISCHARGE NOTE    [] [address] [facility]   [] [address]    [x] [facility]   [] Jared Hazard    Date: 2022  Patient Name:  Deb Wilkins  : [birth year]37  MRN: [id number]  CSN: [id number]    Referring Practitioner Elver Rich MD   Diagnosis Pain in right hip [M25.551]  Pain in left hip [M25.552]  Low back pain, unspecified [M54.50]    Treatment Diagnosis Bilat hip pain and low back pain   Date of Evaluation 22    Additional Pertinent History Arthritis, Osteopenia      Functional Outcome Measure Used Mod Oswestry   Functional Outcome Score 4 (22)       Insurance: Primary: Payor: Urban Julita /  /  / ,   Secondary: [facility]   Authorization Information: Unlimited visits. Aquatics and modalities except Ionto and HP/CP covered. Visit # 5, 5/10 for progress note   Visits Allowed: Unlimited   Recertification Date: None   Physician Follow-Up: 22   Physician Orders:    History of Present Illness:      SUBJECTIVE: Patient reports is doing well. States no change in her hip pain though and still struggles with first couple of steps and with stairs. TREATMENT   Precautions: None   Pain: None    \"X in shaded column indicates activity completed today    *\" next to exercise/intervention indicates progression   Modalities Parameters/  Location  Notes                     Manual Therapy Time/Technique  Notes   No sacral torsion today so no muscle energy      Psoas release to bilat sides at 90 and 45 deg angles  X Tightness noted more to right but less tense bilat today         Exercise/  Intervention   Notes   Supine piriformis stretch and standing IT band stretch        Showed supine, seated and standing psoas stretches              Showed IT band stretch in supine figure four and same in sitting that can try if needed.  Also showed how to angle leg differently with supine piriformis stretch   Went through osteoporosis handouts and showed proper bed mobility and sit to stand. Seated: abdominal bracing                Marching                LAQ  With ball behind back:                Thoracic extension                Horz abd                Shoulder flex above head 10x  10x  10x    10x  10x  10x 5 sec                 Sci-Fit 5 min Level 3 X    Posture against wall    Education on getting shoulders back first instead of tipping head back to make it touch the wall   Showed how to use towel roll to lay on with it along spine for thoracic stretch       Pec stretch in doorway 3x 15 sec     Education on proper gait pattern and showed how tight psoas and weak glut med could affect her gait pattern   X PT noted Trendelenberg on right with SLS. Educated patient on what that meant. SLS and step ups with right leg and working on tightening glut for stability   X                    Specific Interventions Next Treatment: manual therapy, posture education, osteoporosis education, core and leg strengthening, unloading as needed for radicular pain, modalities as needed    Activity/Treatment Tolerance:  [x]  Patient tolerated treatment well  []  Patient limited by fatigue  []  Patient limited by pain   []  Patient limited by medical complications  []  Other:     Assessment: Patient with less tightness noted in bilat psoas today but still more tight and painful to right side. PT also noted today with standing activity that patient has a Trendelenberg on right side that could be affecting her tightness and her gait. Spent a lot of time educating patient on and demonstrating what the weakness meant and how everything can be affecting her gait and what she can do to fix it. Will continue to address all patient's weaknesses and to educate her on what she can work on at home to fix it.     GOALS:  Patient Goal: To learn what to do to have less soreness and be able to be more active    Short Term Goals:  Time Frame: 4 weeks  1) Patient to report 25-50% decrease in soreness/stiffness for ease with going up stairs with reciprocal pattern  2) Patient to demonstrate full lumbar and piriformis range without pain or tightness for ease with dressing  3) Patient to report able to consistently decrease right leg pain for ease with long distance walking  4) Patient to be fully educated on postural and body mechanics corrections to decrease stress on spine and hips   5) Patient to start and progress strength program for general strengthening and for her osteopenia to help provide stability for her hips and back with daily activity and lifting      Long Term Goals:  Time Frame: 12 weeks  1) Patient to be independent with home program to perform all daily actiivty with less difficulty      Patient Education:   [x]  HEP/Education Completed: Continue with HEP above. Add in single leg strengthening for right side to help normalize gait and provide stability at her hips. [employer] Access Code:  []  No new Education completed  [x]  Reviewed Prior HEP      [x]  Patient verbalized and/or demonstrated understanding of education provided. []  Patient unable to verbalize and/or demonstrate understanding of education provided. Will continue education. []  Barriers to learning: None    PLAN:  []  Plan of care initiated. Plan to see patient 2 times per week for 12 weeks to address the treatment planned outlined above.   [x]  Continue with current plan of care  []  Modify plan of care as follows:    []  Hold pending physician visit  []  Discharge    Time In 0915   Time Out 1000   Timed Code Minutes: 45 min   Total Treatment Time: 45 min       Electronically Signed by: [place] [id number]

## 2022-08-25 ENCOUNTER — HOSPITAL ENCOUNTER (OUTPATIENT)
Dept: PHYSICAL THERAPY | Age: 72
Setting detail: THERAPIES SERIES
Discharge: HOME | End: 2022-08-25
Payer: MEDICARE

## 2022-08-25 PROCEDURE — 97110 THERAPEUTIC EXERCISES: CPT

## 2022-08-25 NOTE — PROGRESS NOTES
Psoas release to bilat sides at 90 and 45 deg angles  X No tightness noted on left side and improved tightness on right to where patient not reporting the pain she used to get. PT with increased depth into muscle showing less tension         Exercise/  Intervention   Notes   Supine piriformis stretch and standing IT band stretch        Showed supine, seated and standing psoas stretches              Showed IT band stretch in supine figure four and same in sitting that can try if needed. Also showed how to angle leg differently with supine piriformis stretch   Went through osteoporosis handouts and showed proper bed mobility and sit to stand. Seated: abdominal bracing                Marching                LAQ  With ball behind back:                Thoracic extension                Horz abd                Shoulder flex above head 10x  10x  10x    10x  10x  10x 5 sec                 Sci-Fit 5 min Level 3 X    Posture against wall   X Education on what should notice with progression of upper body strengthening with being able to get shoulders and head back   Showed how to use towel roll to lay on with it along spine for thoracic stretch       Pec stretch in doorway 3x 15 sec     Education on proper gait pattern and showed how tight psoas and weak glut med could affect her gait pattern   X PT noted cont Trendelenberg on right with SLS but patient understanding more how to fix it. SLS and step ups with right leg and working on tightening glut for stability 5x5 SLS  10x B step ups on 4\" - pt reported less pain with and just soreness more noted on right  X Patient overcorrecting so had to physically show and have patient feel what neutral hips felt like so she could mimic at home. Stepping pattern going forward   X Patient with uneven step lengths so educ on how to measure at home and what to look for. Had put fingers on ASIS to make sure stayed forward and she was not twisting to compensate.             Specific Interventions Next Treatment: manual therapy, posture education, osteoporosis education, core and leg strengthening, unloading as needed for radicular pain, modalities as needed    Activity/Treatment Tolerance:  [x]  Patient tolerated treatment well  []  Patient limited by fatigue  []  Patient limited by pain   []  Patient limited by medical complications  []  Other:     Assessment: Patient is slowly and gradually making progress. She is having less pain overall and is just having more soreness which could be due to changing body mechanics and use of weak muscles. Patient does still have pain with steps and first standing from chairs but is slowly improving. Patient with several compensations and pelvic obliquities that were possibly causing stress and compensations at her hips that have been leading to some of her pain. PT educating patient on what to work on at home to keep everything in alignment so will continue to monitor and add in strengthening as can tolerate to help stabilize.     GOALS:  Patient Goal: To learn what to do to have less soreness and be able to be more active    Short Term Goals:  Time Frame: 4 weeks  1) Patient to report 25-50% decrease in soreness/stiffness for ease with going up stairs with reciprocal pattern  2) Patient to demonstrate full lumbar and piriformis range without pain or tightness for ease with dressing  3) Patient to report able to consistently decrease right leg pain for ease with long distance walking  4) Patient to be fully educated on postural and body mechanics corrections to decrease stress on spine and hips   5) Patient to start and progress strength program for general strengthening and for her osteopenia to help provide stability for her hips and back with daily activity and lifting      Long Term Goals:  Time Frame: 12 weeks  1) Patient to be independent with home program to perform all daily actiivty with less difficulty      Patient Education:   [x]  HEP/Education Completed: Continue with HEP above. Continue to monitor gait pattern and make sure hips stay forward and steps are equal. Times when may be helpful to wear back brace. [employer] Access Code:  []  No new Education completed  [x]  Reviewed Prior HEP      [x]  Patient verbalized and/or demonstrated understanding of education provided. []  Patient unable to verbalize and/or demonstrate understanding of education provided. Will continue education. []  Barriers to learning: None    PLAN:  []  Plan of care initiated. Plan to see patient 2 times per week for 12 weeks to address the treatment planned outlined above.   [x]  Continue with current plan of care  []  Modify plan of care as follows:    []  Hold pending physician visit  []  Discharge    Time In 0915   Time Out 1000   Timed Code Minutes: 45 min   Total Treatment Time: 45 min       Electronically Signed by: [place] [id number]

## 2022-08-30 ENCOUNTER — HOSPITAL ENCOUNTER (OUTPATIENT)
Dept: PHYSICAL THERAPY | Age: 72
Setting detail: THERAPIES SERIES
Discharge: HOME | End: 2022-08-30
Payer: MEDICARE

## 2022-08-30 PROCEDURE — 97110 THERAPEUTIC EXERCISES: CPT

## 2022-08-30 PROCEDURE — 97140 MANUAL THERAPY 1/> REGIONS: CPT

## 2022-08-30 NOTE — PROGRESS NOTES
[address] [facility]  PHYSICAL THERAPY  [] EVALUATION  [x] DAILY NOTE (LAND) [] DAILY NOTE (AQUATIC ) [] PROGRESS NOTE [] DISCHARGE NOTE    [] [address] [facility]   [] [address]    [x] [facility]   [] Severiano Stapler    Date: 2022  Patient Name:  Robert Hui  : [birth year]  MRN: [id number]  CSN: [id number]    Referring Practitioner Paula Bullock MD   Diagnosis Pain in right hip [M25.551]  Pain in left hip [M25.552]  Low back pain, unspecified [M54.50]    Treatment Diagnosis Bilat hip pain and low back pain   Date of Evaluation 22    Additional Pertinent History Arthritis, Osteopenia      Functional Outcome Measure Used Mod Oswestry   Functional Outcome Score 4 (22)       Insurance: Primary: Payor: Gulile Bernal /  /  / ,   Secondary: BCBS   Authorization Information: Unlimited visits. Aquatics and modalities except Ionto and HP/CP covered. Visit # 7, 7/10 for progress note   Visits Allowed: Unlimited   Recertification Date: None   Physician Follow-Up: 22   Physician Orders:    History of Present Illness:      SUBJECTIVE: Patient reports doing ok today but her left hip is irritating today at 1-2/10. States doing exercises at home. States stood from bleachers the other day and had to stand for a little bit before could go. States did not have any pain but still waited and thinks it is starting to be more mental than always pain stopping her. Reports still has some minimal pain sporadically but is not as often and so needsd to work through mentally that is doing better.      TREATMENT   Precautions: None   Pain: 1-2/10 irritating soreness left hip    \"X in shaded column indicates activity completed today    *\" next to exercise/intervention indicates progression   Modalities Parameters/  Location  Notes                     Manual Therapy Time/Technique  Notes   No sacral torsion today so no muscle energy      Psoas release to bilat sides at 90 and 45 deg angles  X PT able to go to same depths today but patient reporting a little more tenderness on each side         Exercise/  Intervention   Notes   Supine piriformis stretch and standing IT band stretch        Showed supine, seated and standing psoas stretches              Showed IT band stretch in supine figure four and same in sitting that can try if needed. Also showed how to angle leg differently with supine piriformis stretch   Went through osteoporosis handouts and showed proper bed mobility and sit to stand. Seated: abdominal bracing                Marching                LAQ  With ball behind back:                Thoracic extension                Horz abd                Shoulder flex above head 10x  10x  10x    10x  10x  10x 5 sec                 Sci-Fit 5 min Level 3 X    Posture against wall    Education on what should notice with progression of upper body strengthening with being able to get shoulders and head back   Showed how to use towel roll to lay on with it along spine for thoracic stretch       Pec stretch in doorway 3x 15 sec     Education on proper gait pattern and showed how tight psoas and weak glut med could affect her gait pattern    PT noted cont Trendelenberg on right with SLS but patient understanding more how to fix it.    SLS and step ups with right leg and working on tightening glut for stability 5x5 SLS  10-15x B step ups on 4\" -  X Patient not overcorrecting today and able to perform activity as should   Stepping fwd into small lunge working on stepping with equal range on each side 10x bilat  X    Patient with pain and tenderness noted on left posterior/lateral buttock today - spent time showing patient stretches to do in supine and sitting for glut muscles and piriformis    X      Specific Interventions Next Treatment: manual therapy, posture education, osteoporosis education, core and leg strengthening, unloading as needed for radicular pain, modalities Continue with HEP above. Continue to monitor gait pattern and make sure hips stay forward and steps are equal. Let know what needs for HEP. Start new stretches for gluts/piriformis. [employer] Access Code:  []  No new Education completed  [x]  Reviewed Prior HEP      [x]  Patient verbalized and/or demonstrated understanding of education provided. []  Patient unable to verbalize and/or demonstrate understanding of education provided. Will continue education. []  Barriers to learning: None    PLAN:  []  Plan of care initiated. Plan to see patient 2 times per week for 12 weeks to address the treatment planned outlined above.   [x]  Continue with current plan of care  []  Modify plan of care as follows:    []  Hold pending physician visit  []  Discharge    Time In 0915   Time Out 1000   Timed Code Minutes: 45 min   Total Treatment Time: 45 min       Electronically Signed by: [place] [id number]

## 2022-09-01 ENCOUNTER — HOSPITAL ENCOUNTER (OUTPATIENT)
Dept: PHYSICAL THERAPY | Age: 72
Setting detail: THERAPIES SERIES
Discharge: HOME | End: 2022-09-01
Payer: MEDICARE

## 2022-09-01 PROCEDURE — 97140 MANUAL THERAPY 1/> REGIONS: CPT

## 2022-09-01 PROCEDURE — 97110 THERAPEUTIC EXERCISES: CPT

## 2022-09-01 NOTE — PROGRESS NOTES
deg angles  X PT able to go to same depths today but just felt a little tighter on right side compared to left         Exercise/  Intervention   Notes   Supine piriformis stretch and standing IT band stretch        Showed supine, seated and standing psoas stretches              Showed IT band stretch in supine figure four and same in sitting that can try if needed. Also showed how to angle leg differently with supine piriformis stretch   Went through osteoporosis handouts and showed proper bed mobility and sit to stand. Seated: abdominal bracing                Marching                LAQ  With ball behind back:                Thoracic extension                Horz abd                Shoulder flex above head 10x  10x  10x    10x  10x  10x 5 sec                 Sci-Fit 5 min Level 3 X    Posture against wall    Education on what should notice with progression of upper body strengthening with being able to get shoulders and head back   Showed how to use towel roll to lay on with it along spine for thoracic stretch       Pec stretch in doorway 3x 15 sec     Education on proper gait pattern and showed how tight psoas and weak glut med could affect her gait pattern    PT noted cont Trendelenberg on right with SLS but patient understanding more how to fix it.    SLS and step ups with right leg and working on tightening glut for stability 5x5 SLS  10-15x B step ups on 4\" -   Patient not overcorrecting today and able to perform activity as should   Stepping fwd into small lunge working on stepping with equal range on each side 10x bilat      Patient with pain and tenderness noted on left posterior/lateral buttock today - spent time showing patient stretches to do in supine and sitting for glut muscles and piriformis        Discussed HEP and educated patient on what she needs to continue with   X      Specific Interventions Next Treatment: manual therapy, posture education, osteoporosis education, core and leg strengthening, unloading as needed for radicular pain, modalities as needed    Activity/Treatment Tolerance:  [x]  Patient tolerated treatment well  []  Patient limited by fatigue  []  Patient limited by pain   []  Patient limited by medical complications  []  Other:     Assessment: Patient is showing progress as far as being able to move and do more with less pain but she still has tenderness and pain on left side hip/buttock. PT feels is more muscular in nature and not joint related. Patient had/has a lot of anatomical unevenness and muscular strain and weakness that could all be playing in to what is going on at her hips. PT has been trying to get everything in alignment and teach stretches to patient but is going to take time to get everything used to being in normal alignment and letting the muscles calm down then be strengthened back up to see how her pain finally responds. Some of patient's pain may be arthritic in nature also and so she may always have a little soreness with certain activities. Patient would benefit from therapy 1x/week for 2-4 more weeks to continue to address the tightness in right psoas along with progressing patient's strength program as she continues to work on her stretches and adjusting  her gait pattern to make sure that she is aligned and moving normally and equally.     GOALS:  Patient Goal: To learn what to do to have less soreness and be able to be more active    Short Term Goals:  Time Frame: 4 weeks  1) Patient to report 25-50% decrease in soreness/stiffness for ease with going up stairs with reciprocal pattern   [] Goal Met [x] Goal Not Met [] Continue Goal [] Discontinue Goal  [x] Revise Goal  Goal Assessment: Patient reports is 25% better but still has pain with going up stairs/steps  New Goal: Patient to report 50% decrease in soreness/stiffness for ease with going up stairs with reciprocal pattern  2) Patient to demonstrate full lumbar and piriformis range without pain or tightness for ease with dressing  [] Goal Met [x] Goal Not Met [x] Continue Goal [] Discontinue Goal  [] Revise Goal  Goal Assessment: Patient with increased range but still not full due to some continued tightness  3) Patient to report able to consistently decrease right leg pain for ease with long distance walking  [x] Goal Met [] Goal Not Met [] Continue Goal [] Discontinue Goal  [x] Revise Goal  New Goal: Patient to report able to consistently abolish right leg pain for ease with long distance walking  4) Patient to be fully educated on postural and body mechanics corrections to decrease stress on spine and hips   [x] Goal Met [] Goal Not Met [] Continue Goal [x] Discontinue Goal  [] Revise Goal  5) Patient to start and progress strength program for general strengthening and for her osteopenia to help provide stability for her hips and back with daily activity and lifting  [x] Goal Met [] Goal Not Met [] Continue Goal [] Discontinue Goal  [x] Revise Goal  New Goal: Patient to fully progress strength program for general strengthening and for her osteopenia to help provide stability for her hips and back with daily activity and lifting      Long Term Goals:  Time Frame: 12 weeks  1) Patient to be independent with home program to perform all daily actiivty with less difficulty  [] Goal Met [x] Goal Not Met [x] Continue Goal [] Discontinue Goal  [] Revise Goal  Goal Assessment: Patient still working on HEP      Patient Education:   [x]  HEP/Education Completed: Continue with HEP above. Continue to monitor gait pattern and make sure hips stay forward and steps are equal. Continued POC  [employer] Access Code:  []  No new Education completed  [x]  Reviewed Prior HEP      [x]  Patient verbalized and/or demonstrated understanding of education provided. []  Patient unable to verbalize and/or demonstrate understanding of education provided. Will continue education.   []  Barriers to learning: None    PLAN:  []  Plan of care initiated. Plan to see patient 2 times per week for 12 weeks to address the treatment planned outlined above.   []  Continue with current plan of care  [x]  Modify plan of care as follows:  Decrease therapy to 1x/week for 2-4 weeks  []  Hold pending physician visit  []  Discharge    Time In 0915   Time Out 1000   Timed Code Minutes: 45 min   Total Treatment Time: 45 min       Electronically Signed by: [place] [id number]

## 2022-09-07 ENCOUNTER — HOSPITAL ENCOUNTER (OUTPATIENT)
Dept: PHYSICAL THERAPY | Age: 72
Setting detail: THERAPIES SERIES
Discharge: HOME | End: 2022-09-07
Payer: MEDICARE

## 2022-09-07 PROCEDURE — 97140 MANUAL THERAPY 1/> REGIONS: CPT

## 2022-09-07 PROCEDURE — 97110 THERAPEUTIC EXERCISES: CPT

## 2022-09-07 NOTE — PROGRESS NOTES
[facility]  PHYSICAL THERAPY  [] EVALUATION  [] DAILY NOTE (LAND) [] DAILY NOTE (AQUATIC ) [x] PROGRESS NOTE [] DISCHARGE NOTE    [] [address] [facility]   [] [address]    [x] [facility]   [] Brett Littlejohn    Date: 2022  Patient Name:  Polly Cid  :   MRN: [id number]  CSN: [id number]    Referring Practitioner Bal Nicole MD   Diagnosis Pain in right hip [M25.551]  Pain in left hip [M25.552]  Low back pain, unspecified [M54.50]    Treatment Diagnosis Bilat hip pain and low back pain   Date of Evaluation 22    Additional Pertinent History Arthritis, Osteopenia      Functional Outcome Measure Used Mod Oswestry   Functional Outcome Score 4 (22), 2 (22)      Insurance: Primary: Payor: [address] [place] [address] /  /  / ,   Secondary: [facility]   Authorization Information: Unlimited visits. Aquatics and modalities except Ionto and HP/CP covered. Visit # 9, 1/10 for progress note   Visits Allowed: Unlimited   Recertification Date: None   Physician Follow-Up: 22   Physician Orders:    History of Present Illness:      SUBJECTIVE: Patient reports did not sleep well last night and so is tired today. Reports got into a position last night that caused a sharp pain in her leg but as soon as moved it went away. States also sat at a volleyball game last night so the bleachers caused some discomfort. States still pain with steps - that is not changing much. Reports doing ok today though and no pain right now. TREATMENT   Precautions: None   Pain: None right now    \"X in shaded column indicates activity completed today    *\" next to exercise/intervention indicates progression   Modalities Parameters/  Location  Notes                     Manual Therapy Time/Technique  Notes   No sacral torsion today so no muscle energy. Also no leg length difference or hip rotation noted.       Psoas release to bilat sides at 90 and 45 deg angles  X PT able to go to same depths today with just minimal tightness left side         Exercise/  Intervention   Notes   Supine piriformis stretch and standing IT band stretch        Showed supine, seated and standing psoas stretches              Showed IT band stretch in supine figure four and same in sitting that can try if needed. Also showed how to angle leg differently with supine piriformis stretch   Went through osteoporosis handouts and showed proper bed mobility and sit to stand. Seated: abdominal bracing                Marching                LAQ  With ball behind back:                Thoracic extension                Horz abd                Shoulder flex above head 10x  10x  10x    10x  10x  10x 5 sec                 Sci-Fit 5 min Level 3 X    Posture against wall    Education on what should notice with progression of upper body strengthening with being able to get shoulders and head back   Showed how to use towel roll to lay on with it along spine for thoracic stretch       Pec stretch in doorway 3x 15 sec     Education on proper gait pattern and showed how tight psoas and weak glut med could affect her gait pattern    PT noted cont Trendelenberg on right with SLS but patient understanding more how to fix it.    SLS and step ups with right leg and working on tightening glut for stability 5x5 SLS  10-15x B step ups on 4\"   X    Stepping fwd into small lunge working on stepping with equal range on each side 10x bilat  X    Standing: 3-way hip                  Marching                  Rotation                  Heel raises                  Shoulder flex                  HS curls                  Diagonals 10x each B  10x B  10x  10x  10x  10x  10x  X  X  X  X  X  X  X    Discussed HEP and educated patient on what she needs to continue with       Rockerboard 2 directions 10x each way  X Balance 30 seconds each way   Balance: heel to toe                  Feet side by side 30 sec        Specific Interventions Next Treatment: manual therapy, posture education, osteoporosis education, core and leg strengthening, unloading as needed for radicular pain, modalities as needed    Activity/Treatment Tolerance:  [x]  Patient tolerated treatment well  []  Patient limited by fatigue  []  Patient limited by pain   []  Patient limited by medical complications  []  Other:     Assessment: Patient doing well but still has pain with certain motions and activities. As soon as she changes positions or stops then the pain subsides. Progressed strengthening and balance program today. Patient's pain still appears to be more muscular in nature so is why progressed more strengthening today. Will continue to progress program as can tolerate and monitor pain. States feels like had a workout today but no pain. GOALS:  Patient Goal: To learn what to do to have less soreness and be able to be more active    Short Term Goals:  Time Frame: 4 weeks  1) New Goal: Patient to report 50% decrease in soreness/stiffness for ease with going up stairs with reciprocal pattern  2) Patient to demonstrate full lumbar and piriformis range without pain or tightness for ease with dressing  3) New Goal: Patient to report able to consistently abolish right leg pain for ease with long distance walking  5) New Goal: Patient to fully progress strength program for general strengthening and for her osteopenia to help provide stability for her hips and back with daily activity and lifting      Long Term Goals:  Time Frame: 12 weeks  1) Patient to be independent with home program to perform all daily actiivty with less difficulty      Patient Education:   [x]  HEP/Education Completed: Start new exercises given today and try to continue working on posture and body mechanics   [employer] Access Code:  []  No new Education completed  [x]  Reviewed Prior HEP      [x]  Patient verbalized and/or demonstrated understanding of education provided.   []  Patient unable to verbalize and/or demonstrate understanding of education provided. Will continue education. []  Barriers to learning: None    PLAN:  []  Plan of care initiated. Plan to see patient 2 times per week for 12 weeks to address the treatment planned outlined above.   []  Continue with current plan of care  [x]  Modify plan of care as follows:  Decrease therapy to 1x/week for 2-4 weeks  []  Hold pending physician visit  []  Discharge    Time In 0935   Time Out 1015   Timed Code Minutes: 40 min   Total Treatment Time: 40 min       Electronically Signed by: [place] [id number]

## 2022-09-12 RX ORDER — ANASTROZOLE 1 MG/1
TABLET ORAL
Qty: 90 TABLET | Refills: 3 | Status: SHIPPED | OUTPATIENT
Start: 2022-09-12

## 2022-09-12 NOTE — PROGRESS NOTES
[facility], [address]  Phone: [phone]   Toll Free: [phone]   Fax: [phone]    RADIATION ONCOLOGY FOLLOW UP REPORT    PATIENT NAME:  Raymundo Carbone     lumpectomy site: 1950  MEDICAL RECORD NO: [id number]    LOCATION: [facility] NO: [id number]      PROVIDER: JORDEN Benavides CNP        DATE OF SERVICE: 12/9/2020    FOLLOW UP PHYSICIANS: Jerry Phipps    DIAGNOSIS: C50.411 Malignant neoplasm of upper-outer quadrant of right breast in female. ER/ER +  Her2 -  T1c N0 M0 Stage IA. DATE OF DIAGNOSIS: 7/23/2020    END OF TREATMENT DATE: 10/20/2020    ECOG PERFORMANCE STATUS: 0    PAIN: Denies    CHAPERONE: Declined      HPI: Ortiz Lennon is a 34-year-old female with newly diagnosed right breast cancer.  She presents to the medical oncology clinic to discuss postsurgical treatment. The patient had annual mammogram in October 2019. Sb Lombardo showed possible irregular density with architectural distortion in the left breast.  Additional compression and lateromedial views were recommended as well as left breast ultrasound.  There was no sonographic correlate for the irregular left breast mammographic abnormality.  Stereotactic guided biopsy was recommended.  In addition ultrasound revealed an 0.8 cm x 0.7 cm oval lesion in the left breast upper outer aspect.  Differential diagnosis included complex cyst or a solid mass.  Diagnostic aspiration or possible core biopsy was recommended.  Left breast core needle biopsy on October 23, 2019 showed atypical ductal hyperplasia, atypical lobular hyperplasia.  On November 7, 2019 the patient underwent left breast lumpectomy that showed only focal atypical ductal hyperplasia, focal atypical lobular hyperplasia.  On July 15, 2020 the patient underwent bilateral breast MRI, it showed to 1.9 cm x 1.1 cm irregular spiculated mass in the right breast at the 10 o'clock position. Xiomara Quveedo were no abnormal looking axillary lymph nodes.  Ultrasound of the right breast on July 23, 2020 confirmed the presence of irregular mass.  The same day the patient underwent ultrasound-guided biopsy which showed invasive ductal carcinoma, grade 2, estrogen receptor positive in 100% of cells, progesterone receptor positive and 60% of cells, HER-2 by IHC was equivocal 2+ but FISH was nonamplified  She  met with the surgeon Dr. Larry Almonte to discuss further surgical management. She decided to proceed with right lumpectomy.  Due to age of 69 and clinically negative lymph node sentinel lymph node biopsy was omitted. Final pathology as noted above. Arin Youssef met with radiation oncology and received a recommendation for postoperative radiation therapy. She was agreeable to proceed with the recommendation. Arin Youssef tolerated her radiation therapy well. She did develop mild follicular skin reaction towards the end of treatment. This was treated with topical management and responded appropriately. She did not have any unexpected side effects. INTERVAL HISTORY: Arin Youssef returns to [address] [place] [address] for a post treatment follow up after undergoing surgery and radiation therapy to the right breast. Dex Alanna denies complaints related to the radiation treatment at this time. Since completing treatment she has met with Dr. Carolin Augustin and was started on Arimidex. Dex Orozcorene states she feels she is tolerating it well at this time. She denies chest pain, sob, fever, chills, nausea, vomiting, fatigue, abdominal pain, right breast pain, swelling or erythema, right arm swelling or decreased ROM, unintentional weight loss or loss of appetite.      LAB RESULTS:   CBC:   Lab Results   Component Value Date    WBC 6.9 07/01/2020    RBC 4.90 07/01/2020    HGB 15.3 07/01/2020    HCT 50.2 07/01/2020    .4 07/01/2020    MCH 31.2 07/01/2020    MCHC 30.5 07/01/2020     07/01/2020    MPV 11.7 07/01/2020     CMP:  Lab Results or architectural distortions. Previous lumpectomy site is without signs of infection. Nipple is everted without discharge. Right breast is soft, slightly tender with palpation. There are no suspicious masses or lumps. Surgical incision sites well-healed without signs of infection. There is no visible swelling or residual erythema/hyperpigmentation to the skin. Nipple is everted without discharge. ABDOMEN: Soft, nontender. Active bowel sounds x4. EXTREMITIES: Without clubbing or cyanosis. No evidence of lymphedema noted. Full range of motion of bilateral upper extremities. NEUROLOGIC EXAMINATION: Cranial nerves grossly intact. ASSESSMENT: Dex Mondragon was diagnosed with right breast cancer in July 2020. She underwent treatment with surgery and radiation therapy. Since completing radiation therapy she has been initiated on Arimidex. Dex Mondragon is tolerating the Arimidex well without any adverse side effects at this time. He does not have any concerning findings on today's physical exam and is recovering well from radiation treatment. PLAN:  1. Mammogram: due at least 6 months s/p XRT  2. Arimidex: tolerating well. 3. DEXA: completed 7/15/2020: osteopenia, on Prolia. 4. S/p left breast lumpectomy on 11/7/2019: showed focal atypical ductal hyperplasia  5. Continue follow up with other providers as scheduled. 6. Follow up here in May --  Offset appointments with Dr. Carolin Augustin. 9. Dex Mondragon is aware she is to call for questions, concerns or changes in condition. Electronically signed by JORDEN Blanton CNP on 12/9/20 at 11:10 AM EST      CC: Sabra Shah stated

## 2022-09-14 ENCOUNTER — HOSPITAL ENCOUNTER (OUTPATIENT)
Dept: PHYSICAL THERAPY | Age: 72
Setting detail: THERAPIES SERIES
Discharge: HOME | End: 2022-09-14
Payer: MEDICARE

## 2022-09-14 PROCEDURE — 97110 THERAPEUTIC EXERCISES: CPT

## 2022-09-14 PROCEDURE — 97112 NEUROMUSCULAR REEDUCATION: CPT

## 2022-09-14 NOTE — PROGRESS NOTES
[address] [facility]  PHYSICAL THERAPY  [] EVALUATION  [x] DAILY NOTE (LAND) [] DAILY NOTE (AQUATIC ) [] PROGRESS NOTE [] DISCHARGE NOTE    [] [facility]   [] [address]    [x] [facility]   [] Ritesh Wilburn    Date: 2022  Patient Name:  Renetta Clement  :   MRN: [id number]  CSN: [id number]    Referring Practitioner Derek Mosley MD   Diagnosis Pain in right hip [M25.551]  Pain in left hip [M25.552]  Low back pain, unspecified [M54.50]    Treatment Diagnosis Bilat hip pain and low back pain   Date of Evaluation 22    Additional Pertinent History Arthritis, Osteopenia      Functional Outcome Measure Used Mod Oswestry   Functional Outcome Score 4 (22), 2 (22)      Insurance: Primary: Payor: Katelyn Ross /  /  / ,   Secondary: [employer]   Authorization Information: Unlimited visits. Aquatics and modalities except Ionto and HP/CP covered. Visit # 10, 2/10 for progress note   Visits Allowed: Unlimited   Recertification Date: None   Physician Follow-Up: 22   Physician Orders:    History of Present Illness:      SUBJECTIVE: Patient reports doing ok today. States still has pain that can increase with certain activities but otherwise is just staying about the same. TREATMENT   Precautions: None   Pain: None right now    \"X in shaded column indicates activity completed today    *\" next to exercise/intervention indicates progression   Modalities Parameters/  Location  Notes                     Manual Therapy Time/Technique  Notes   No sacral torsion today so no muscle energy. Also no leg length difference or hip rotation noted.       Psoas release to bilat sides at 90 and 45 deg angles  X PT able to go to same depths today with just minimal tightness right side at 90 deg but no limitations         Exercise/  Intervention   Notes   Supine piriformis stretch and standing IT band stretch        Showed supine, seated and Treatment: manual therapy, posture education, osteoporosis education, core and leg strengthening, unloading as needed for radicular pain, modalities as needed    Activity/Treatment Tolerance:  [x]  Patient tolerated treatment well  []  Patient limited by fatigue  []  Patient limited by pain   []  Patient limited by medical complications  []  Other:     Assessment: Patient able to increase to 6\" step today and pain in left hip was not as bad as has been and she did not get the sharp pain she has been getting. Progressed patient with activity today with adding in foam and BOSU. Patient reported feeling good at end of session. GOALS:  Patient Goal: To learn what to do to have less soreness and be able to be more active    Short Term Goals:  Time Frame: 4 weeks  1) New Goal: Patient to report 50% decrease in soreness/stiffness for ease with going up stairs with reciprocal pattern  2) Patient to demonstrate full lumbar and piriformis range without pain or tightness for ease with dressing  3) New Goal: Patient to report able to consistently abolish right leg pain for ease with long distance walking  5) New Goal: Patient to fully progress strength program for general strengthening and for her osteopenia to help provide stability for her hips and back with daily activity and lifting      Long Term Goals:  Time Frame: 12 weeks  1) Patient to be independent with home program to perform all daily actiivty with less difficulty      Patient Education:   []  HEP/Education Completed: Continue with HEP   [employer] Access Code:  [x]  No new Education completed  [x]  Reviewed Prior HEP      [x]  Patient verbalized and/or demonstrated understanding of education provided. []  Patient unable to verbalize and/or demonstrate understanding of education provided. Will continue education. []  Barriers to learning: None    PLAN:  []  Plan of care initiated.   Plan to see patient 2 times per week for 12 weeks to address the treatment planned outlined above.   []  Continue with current plan of care  [x]  Modify plan of care as follows:  Decrease therapy to 1x/week for 2-4 weeks  []  Hold pending physician visit  []  Discharge    Time In 0920   Time Out 1000   Timed Code Minutes: 40 min   Total Treatment Time: 40 min       Electronically Signed by: [place] [id number]

## 2022-09-19 ENCOUNTER — HOSPITAL ENCOUNTER (OUTPATIENT)
Dept: PHYSICAL THERAPY | Age: 72
Setting detail: THERAPIES SERIES
Discharge: HOME | End: 2022-09-19
Payer: MEDICARE

## 2022-09-19 PROCEDURE — 97110 THERAPEUTIC EXERCISES: CPT

## 2022-09-19 NOTE — PROGRESS NOTES
[address] [facility]  PHYSICAL THERAPY  [] EVALUATION  [x] DAILY NOTE (LAND) [] DAILY NOTE (AQUATIC ) [] PROGRESS NOTE [] DISCHARGE NOTE    [] [facility]   [] [address]    [x] [facility]   [] Nancie Jones    Date: 2022  Patient Name:  Marilou Newell  :   MRN: [id number]  CSN: [id number]    Referring Practitioner Juan Manuel Milan MD   Diagnosis Pain in right hip [M25.551]  Pain in left hip [M25.552]  Low back pain, unspecified [M54.50]    Treatment Diagnosis Bilat hip pain and low back pain   Date of Evaluation 22    Additional Pertinent History Arthritis, Osteopenia      Functional Outcome Measure Used Mod Oswestry   Functional Outcome Score 4 (22), 2 (22)      Insurance: Primary: Payor: [place] /  /  / ,   Secondary: [employer]   Authorization Information: Unlimited visits. Aquatics and modalities except Ionto and HP/CP covered. Visit # 11, 3/10 for progress note   Visits Allowed: Unlimited   Recertification Date: None   Physician Follow-Up: 22   Physician Orders:    History of Present Illness:      SUBJECTIVE: Patient reports continuing to do ok but still has the same pain in left side posterior hip with sit to stand and stepping up onto a step with left foot leading. States if walks too long then will have soreness back there but not pain.  does not really have pain with any other activity and is always on the left. States wondering if needs to talk to her doctor because is doing everything therapist has asked of her at home.  is much better than when started therapy but just cannot get rid of this last pain.  had a thumping pain in her right foot last night but as soon as she changed positions it went away.     TREATMENT   Precautions: None   Pain: None right now    \"X in shaded column indicates activity completed today    *\" next to exercise/intervention indicates progression Modalities Parameters/  Location  Notes                     Manual Therapy Time/Technique  Notes   No sacral torsion today so no muscle energy. Also no leg length difference or hip rotation noted. Psoas release to bilat sides at 90 and 45 deg angles  X PT able to go to same depths today with just minimal tightness right side at 90 deg but no limitations         Exercise/  Intervention   Notes   Supine piriformis stretch and standing IT band stretch        Showed supine, seated and standing psoas stretches              Showed IT band stretch in supine figure four and same in sitting that can try if needed. Also showed how to angle leg differently with supine piriformis stretch   Went through osteoporosis handouts and showed proper bed mobility and sit to stand. Seated on BOSU: abdominal bracing                                Marching                                LAQ                                Rows                                Horz abd                                Hip abd  With ball behind back:                Thoracic extension                Horz abd                Shoulder flex above head 10x  10x  10x  10x  10x  10x    10x  10x  10x 5 sec                       Sci-Fit 5 min Level 3 X    Total Gym Level H: squats                                  Single leg squats 10x  10x bilat               Pec stretch in doorway 3x 15 sec     Education on proper gait pattern and showed how tight psoas and weak glut med could affect her gait pattern    PT noted cont Trendelenberg on right with SLS but patient understanding more how to fix it.    SLS and step ups with right leg and working on tightening glut for stability 5x5 SLS  10x B step ups on 6\"         Stepping fwd into small lunge onto BOSU 10x bilat  X    Standing on blue foam: 3-way hip                                        Marching                                        Rotation                                        Heel raises Shoulder flex                                        HS curls                                         Diagonals 10x each B  10x B  10x  10x  10x  10x  10x                  Discussed HEP and educated patient on what she needs to continue with       Rockerboard 2 directions 10x each way  X Balance 30 seconds each way   Balance on blue foam: heel to toe                                       Feet side by side 30 sec          Specific Interventions Next Treatment: manual therapy, posture education, osteoporosis education, core and leg strengthening, unloading as needed for radicular pain, modalities as needed    Activity/Treatment Tolerance:  [x]  Patient tolerated treatment well  []  Patient limited by fatigue  []  Patient limited by pain   []  Patient limited by medical complications  []  Other:     Assessment: Patient still having pain in area of left SI only with stepping up onto a step with left foot and with sit to stand. States area gets sore with sitting on bleachers and first couple of steps may be painful on left but then is fine once gets moving. Patient also is  over left side SI area. PT is wondering if patient would benefit from an MRI of her low back and SI areas to see if anything could be going on to cause the remaining pain she is having. Patient with level and even pelvis and has been doing a full HEP for strengthening and stretching. She is aware of posture and body mechanics. PT wondering if something going on with low back that is not responding to therapy or if has some increased degeneration at [facility] joint causing the pain with transfers and steps. Patient's pain is not consistent and does not last long when has it so unsure if is her low back. Patient has one more therapy appt scheduled so will see how she is feeling and what doctor says about testing.     GOALS:  Patient Goal: To learn what to do to have less soreness and be able to be more active    Short Term Goals: Time Frame: 4 weeks  1) New Goal: Patient to report 50% decrease in soreness/stiffness for ease with going up stairs with reciprocal pattern  2) Patient to demonstrate full lumbar and piriformis range without pain or tightness for ease with dressing  3) New Goal: Patient to report able to consistently abolish right leg pain for ease with long distance walking  5) New Goal: Patient to fully progress strength program for general strengthening and for her osteopenia to help provide stability for her hips and back with daily activity and lifting      Long Term Goals:  Time Frame: 12 weeks  1) Patient to be independent with home program to perform all daily actiivty with less difficulty      Patient Education:   [x]  HEP/Education Completed: Continue with HEP. PT to contact doctor's office regarding testing   [employer] Access Code:  []  No new Education completed  [x]  Reviewed Prior HEP      [x]  Patient verbalized and/or demonstrated understanding of education provided. []  Patient unable to verbalize and/or demonstrate understanding of education provided. Will continue education. []  Barriers to learning: None    PLAN:  []  Plan of care initiated. Plan to see patient 2 times per week for 12 weeks to address the treatment planned outlined above.   []  Continue with current plan of care  [x]  Modify plan of care as follows:  Decrease therapy to 1x/week for 2-4 weeks  []  Hold pending physician visit  []  Discharge    Time In 0920   Time Out 1000   Timed Code Minutes: 40 min   Total Treatment Time: 40 min       Electronically Signed by: [place] [id number]

## 2022-09-20 ENCOUNTER — TELEPHONE (OUTPATIENT)
Dept: FAMILY MEDICINE CLINIC | Age: 72
End: 2022-09-20

## 2022-09-22 ENCOUNTER — OFFICE VISIT (OUTPATIENT)
Dept: FAMILY MEDICINE CLINIC | Age: 72
End: 2022-09-22
Payer: MEDICARE

## 2022-09-22 VITALS
WEIGHT: 148 LBS | RESPIRATION RATE: 12 BRPM | BODY MASS INDEX: 25.39 KG/M2 | SYSTOLIC BLOOD PRESSURE: 124 MMHG | DIASTOLIC BLOOD PRESSURE: 70 MMHG | TEMPERATURE: 98.6 F | HEART RATE: 81 BPM

## 2022-09-22 DIAGNOSIS — M25.552 HIP PAIN, BILATERAL: Primary | ICD-10-CM

## 2022-09-22 DIAGNOSIS — M25.551 HIP PAIN, BILATERAL: Primary | ICD-10-CM

## 2022-09-22 DIAGNOSIS — M54.50 LUMBAR BACK PAIN: ICD-10-CM

## 2022-09-22 DIAGNOSIS — Z23 FLU VACCINE NEED: ICD-10-CM

## 2022-09-22 PROCEDURE — 1090F PRES/ABSN URINE INCON ASSESS: CPT | Performed by: FAMILY MEDICINE

## 2022-09-22 PROCEDURE — 99213 OFFICE O/P EST LOW 20 MIN: CPT | Performed by: FAMILY MEDICINE

## 2022-09-22 PROCEDURE — G0008 ADMIN INFLUENZA VIRUS VAC: HCPCS | Performed by: FAMILY MEDICINE

## 2022-09-22 PROCEDURE — 1123F ACP DISCUSS/DSCN MKR DOCD: CPT | Performed by: FAMILY MEDICINE

## 2022-09-22 PROCEDURE — G8399 PT W/DXA RESULTS DOCUMENT: HCPCS | Performed by: FAMILY MEDICINE

## 2022-09-22 PROCEDURE — G8417 CALC BMI ABV UP PARAM F/U: HCPCS | Performed by: FAMILY MEDICINE

## 2022-09-22 PROCEDURE — 3017F COLORECTAL CA SCREEN DOC REV: CPT | Performed by: FAMILY MEDICINE

## 2022-09-22 PROCEDURE — 90694 VACC AIIV4 NO PRSRV 0.5ML IM: CPT | Performed by: FAMILY MEDICINE

## 2022-09-22 PROCEDURE — G8427 DOCREV CUR MEDS BY ELIG CLIN: HCPCS | Performed by: FAMILY MEDICINE

## 2022-09-22 PROCEDURE — 1036F TOBACCO NON-USER: CPT | Performed by: FAMILY MEDICINE

## 2022-09-22 NOTE — PROGRESS NOTES
[address] [place] [id number]  Dept: [phone]  Dept Fax: [phone]  Loc: [phone]    Tyrell Winchester is a 67 y.o. female who presents today for:  Chief Complaint   Patient presents with    Hip Pain     Bilateral hip pain     Flu Vaccine           HPI:     HPI  Her for hip pain bilateral on and off for 5 years. PT and chiropractor is not helping. Worse stepping up a step and after sitting for a long period of time. Aleve prn helps to decrease the pain but not completely. Reviewed chart forpast medical history , surgical history , allergies, social history , family history and medications. Health Maintenance   Topic Date Due    COVID-19 Vaccine (3 - Moderna risk series) 04/02/2021    Breast cancer screen  06/23/2023    Lipids  07/25/2023    Depression Screen  07/27/2023    Annual Wellness Visit (AWV)  07/28/2023    DTaP/Tdap/Td vaccine (2 - Td or Tdap) 08/14/2023    Colorectal Cancer Screen  01/29/2026    DEXA (modify frequency per FRAX score)  Completed    Flu vaccine  Completed    Shingles vaccine  Completed    Pneumococcal 65+ years Vaccine  Completed    Hepatitis C screen  Completed    Hepatitis A vaccine  Aged Out    Hepatitis B vaccine  Aged Out    Hib vaccine  Aged Out    Meningococcal (ACWY) vaccine  Aged Out       Subjective:      Constitutional:Negative for fever, chills, diaphoresis, activity change, appetite change and fatigue. HENT: Negative for hearing loss, ear pain, congestion, sore throat, rhinorrhea, postnasal drip and ear discharge. Eyes: Negative for photophobia and visual disturbance. Respiratory: Negative for cough, chest tightness, shortness of breath and wheezing. Cardiovascular: Negative for chest pain and leg swelling. Gastrointestinal: Negative for nausea, vomiting, abdominal pain, diarrhea and constipation.    Genitourinary: Negative for dysuria, urgency and frequency. Neurological: Negative for weakness, light-headedness and headaches. Psychiatric/Behavioral: Negative for sleep disturbance.      :     Vitals:    09/22/22 1452   BP: 124/70   Site: Left Upper Arm   Position: Sitting   Cuff Size: Medium Adult   Pulse: 81   Resp: 12   Temp: 98.6 °F (37 °C)   TempSrc: Temporal   Weight: 148 lb (67.1 kg)     Wt Readings from Last 3 Encounters:   09/22/22 148 lb (67.1 kg)   07/27/22 148 lb (67.1 kg)   07/22/22 147 lb (66.7 kg)       Physical Exam  Musculoskeletal:      Lumbar back: Tenderness (at right SI joint) present. No swelling, edema, deformity, signs of trauma, lacerations, spasms or bony tenderness. Normal range of motion. Positive right straight leg raise test and positive left straight leg raise test. No scoliosis. Right hip: Tenderness (greater trochanter) present. No deformity, lacerations, bony tenderness or crepitus. Normal range of motion (pain in the groin wtih internal rotation). Normal strength. Left hip: Tenderness (greater trochanter) present. No deformity, lacerations, bony tenderness or crepitus. Normal range of motion (pain radiates to the groin). Normal strength. Assessment/Plan   Brando Blanco was seen today for hip pain and flu vaccine. Diagnoses and all orders for this visit:    Hip pain, bilateral  -     MRI HIP RIGHT WO CONTRAST; Future  -     MRI HIP LEFT WO CONTRAST; Future  -     MRI LUMBAR SPINE WO CONTRAST; Future    Flu vaccine need  -     Influenza, FLUAD, (age 72 y+), IM, Preservative Free, 0.5 mL    Lumbar back pain  -     MRI HIP RIGHT WO CONTRAST; Future  -     MRI HIP LEFT WO CONTRAST; Future  -     MRI LUMBAR SPINE WO CONTRAST; Future    Stretches TID    Aleve prn    Call or return to clinic prn if these symptoms worsen or fail to improve as anticipated. Discussed use, benefit, and side effectsof prescribed medications. All patient questions answered. Pt voiced understanding. Reviewed health maintenance.

## 2022-09-22 NOTE — PROGRESS NOTES
Vaccine Information Sheet, \"Influenza - Inactivated\"  given to Lauren Beard, or parent/legal guardian of  Lauren Beard and verbalized understanding. Patient responses:    Have you ever had a reaction to a flu vaccine? No  Do you have an allergy to eggs, neomycin or polymixin? No  Do you have an allergy to Thimerosal, contact lens solution, or Merthiolate? No  Have you ever had Guillian Pembroke Syndrome? No  Do you have any current illness? No  Do you have a temperature above 100 degrees? No  Are you pregnant? N/A  If pregnant, permission obtained from physician? N/A  Do you have an active neurological disorder? No      Flu vaccine given per order. Please see immunization tab. Immunizations Administered       Name Date Dose Route    Influenza, FLUAD, (age 72 y+), Adjuvanted, 0.5mL 9/22/2022 0.5 mL Intramuscular    Site: Deltoid- Left    Lot: [id number]    NDC: 59791-820-21            VIS GIVEN. CONSENT SIGNED  PATIENT TOLERATED WELL.

## 2022-09-28 ENCOUNTER — HOSPITAL ENCOUNTER (OUTPATIENT)
Dept: PHYSICAL THERAPY | Age: 72
Setting detail: THERAPIES SERIES
Discharge: HOME | End: 2022-09-28
Payer: MEDICARE

## 2022-09-28 ENCOUNTER — TELEPHONE (OUTPATIENT)
Dept: FAMILY MEDICINE CLINIC | Age: 72
End: 2022-09-28

## 2022-09-28 ENCOUNTER — HOSPITAL ENCOUNTER (OUTPATIENT)
Dept: MRI IMAGING | Age: 72
Discharge: HOME | End: 2022-09-28
Payer: MEDICARE

## 2022-09-28 DIAGNOSIS — M54.89 INFLAMMATORY BACK PAIN: ICD-10-CM

## 2022-09-28 DIAGNOSIS — M25.551 HIP PAIN, BILATERAL: ICD-10-CM

## 2022-09-28 DIAGNOSIS — M47.819 ARTHRITIS OF LOW BACK: ICD-10-CM

## 2022-09-28 DIAGNOSIS — M25.552 HIP PAIN, BILATERAL: ICD-10-CM

## 2022-09-28 DIAGNOSIS — M54.50 LUMBAR BACK PAIN: ICD-10-CM

## 2022-09-28 DIAGNOSIS — M16.0 ARTHRITIS OF BOTH HIPS: Primary | ICD-10-CM

## 2022-09-28 DIAGNOSIS — M16.10: ICD-10-CM

## 2022-09-28 PROCEDURE — 73721 MRI JNT OF LWR EXTRE W/O DYE: CPT

## 2022-09-28 PROCEDURE — 72148 MRI LUMBAR SPINE W/O DYE: CPT

## 2022-09-28 PROCEDURE — 97110 THERAPEUTIC EXERCISES: CPT

## 2022-09-28 NOTE — TELEPHONE ENCOUNTER
Arthritis and inflammation in both hips and low back  Refer to ortho for further treatment  Call patient

## 2022-09-28 NOTE — PROGRESS NOTES
[address] [facility]  PHYSICAL THERAPY  [] EVALUATION  [] DAILY NOTE (LAND) [] DAILY NOTE (AQUATIC ) [x] PROGRESS NOTE [] DISCHARGE NOTE    [] [facility]   [] [address]    [x] [facility]   [] [place] [address]    Date: 2022  Patient Name:  Rick Horner  :   MRN: [id number]  CSN: [id number]    Referring Practitioner Benjamín Wasserman MD   Diagnosis Pain in right hip [M25.551]  Pain in left hip [M25.552]  Low back pain, unspecified [M54.50]    Treatment Diagnosis Bilat hip pain and low back pain   Date of Evaluation 22    Additional Pertinent History Arthritis, Osteopenia      Functional Outcome Measure Used Mod Oswestry   Functional Outcome Score 4 (22), 2 (22)      Insurance: Primary: Payor: [place] /  /  / ,   Secondary: [facility]   Authorization Information: Unlimited visits. Aquatics and modalities except Ionto and HP/CP covered. Visit # 12, 3/10 for progress note   Visits Allowed: Unlimited   Recertification Date: None   Physician Follow-Up: None   Physician Orders:    History of Present Illness:      SUBJECTIVE: Patient reports is doing ok today.  has good and bad days as far as pain.  last night had trouble getting to sleep and had a muscle spasm in left leg that lifted her leg off the bed and not sure why.  is not very often that gets big muscle spasms and occasionally gets small spasms.  was supposed to get MRIs for her hips and low back next week but they called with an opening today.  pain is better than when started therapy but still pain with going up steps and standing up after sitting for awhile.  would like to be put on hold until gets results from MRI.     TREATMENT   Precautions: None   Pain: None right now    \"X in shaded column indicates activity completed today    *\" next to exercise/intervention indicates progression   Modalities Parameters/  Location  Notes Manual Therapy Time/Technique  Notes   No sacral torsion today so no muscle energy. Also no leg length difference or hip rotation noted. X    Psoas release to bilat sides at 90 and 45 deg angles  X PT able to go to same depths today with just minimal tightness bilat sides at 90 deg but no limitations         Exercise/  Intervention   Notes   Supine DKTC 2x10 - no production of pain or symptoms  Prone lying - no change  Prone prop - no change  Press ups x10 - no change           X    X  X  X    Supine piriformis stretch and standing IT band stretch        Showed supine, seated and standing psoas stretches              Showed IT band stretch in supine figure four and same in sitting that can try if needed. Also showed how to angle leg differently with supine piriformis stretch   Went through osteoporosis handouts and showed proper bed mobility and sit to stand. Seated on BOSU: abdominal bracing                                Marching                                LAQ                                Rows                                Horz abd                                Hip abd  With ball behind back:                Thoracic extension                Horz abd                Shoulder flex above head 10x  10x  10x  10x  10x  10x    10x  10x  10x 5 sec                       Sci-Fit 5 min Level 3     Total Gym Level H: squats                                  Single leg squats 10x  10x bilat               Pec stretch in doorway 3x 15 sec     Education on proper gait pattern and showed how tight psoas and weak glut med could affect her gait pattern    PT noted cont Trendelenberg on right with SLS but patient understanding more how to fix it.    SLS and step ups with right leg and working on tightening glut for stability 5x5 SLS  10x B step ups on 6\"         Stepping fwd into small lunge onto BOSU 10x bilat      Standing on blue foam: 3-way hip                                        Marching Rotation                                        Heel raises                                        Shoulder flex                                        HS curls                                         Diagonals 10x each B  10x B  10x  10x  10x  10x  10x                  Discussed HEP and educated patient on what she needs to continue with   X    Rockerboard 2 directions 10x each way   Balance 30 seconds each way   Balance on blue foam: heel to toe                                       Feet side by side 30 sec          Specific Interventions Next Treatment: manual therapy, posture education, osteoporosis education, core and leg strengthening, unloading as needed for radicular pain, modalities as needed    Activity/Treatment Tolerance:  [x]  Patient tolerated treatment well  []  Patient limited by fatigue  []  Patient limited by pain   []  Patient limited by medical complications  []  Other:     Assessment: Patient still having pain in area of left SI only with stepping up onto a step with left foot and with sit to stand. States area gets sore with sitting on bleachers and first couple of steps may be painful on left but then is fine once gets moving. Patient also is  over left side SI area with tenderness over right side also at times. PT feels patient would benefit from an MRI of her low back and SI areas to see if anything could be going on to cause the remaining pain she is having - is getting within the next week. Patient with level and even pelvis and has been doing a full HEP for strengthening and stretching. She is aware of posture and body mechanics. PT wondering if something going on with low back that is not responding to therapy or if has some increased degeneration at [facility] joint causing the pain with transfers and steps.  PT had patient perform positioning for low back today and neither flexion or extension produced pain or symptoms so does not appear to be a disc issue. Patient's pain is not consistent and does not last long when has it so unsure if is her low back. Patient being put on hold until has testing done and sees what doctor would like to do next. GOALS:  Patient Goal: To learn what to do to have less soreness and be able to be more active    Short Term Goals:  Time Frame: 4 weeks  1) New Goal: Patient to report 50% decrease in soreness/stiffness for ease with going up stairs with reciprocal pattern  [] Goal Met [x] Goal Not Met [x] Continue Goal [] Discontinue Goal  [] Revise Goal  Goal Assessment: Patient reports no change since last PN. 2) Patient to demonstrate full lumbar and piriformis range without pain or tightness for ease with dressing  [] Goal Met [x] Goal Not Met [] Continue Goal [] Discontinue Goal  [x] Revise Goal  Goal Assessment: Patient has met goal for lumbar range and right piriformis but still having some pain towards end range with left piriformis  New Goal: Patient to demonstrate full left piriformis range without pain or tightness for ease with dressing  3) New Goal: Patient to report able to consistently abolish right leg pain for ease with long distance walking  [x] Goal Met [] Goal Not Met [] Continue Goal [x] Discontinue Goal  [] Revise Goal  Goal Assessment: Patient reports right leg symptoms are good but now her pain is in her left hip.   5) New Goal: Patient to fully progress strength program for general strengthening and for her osteopenia to help provide stability for her hips and back with daily activity and lifting  [x] Goal Met [] Goal Not Met [] Continue Goal [x] Discontinue Goal  [] Revise Goal      Long Term Goals:  Time Frame: 12 weeks  1) Patient to be independent with home program to perform all daily actiivty with less difficulty  [] Goal Met [x] Goal Not Met [x] Continue Goal [] Discontinue Goal  [] Revise Goal  Goal Assessment: Patient still working on HEP      Patient Education:   [x]  HEP/Education Completed:

## 2022-10-26 ENCOUNTER — HOSPITAL ENCOUNTER (OUTPATIENT)
Dept: PHYSICAL THERAPY | Age: 72
Setting detail: THERAPIES SERIES
Discharge: HOME | End: 2022-10-26
Payer: MEDICARE

## 2022-10-26 PROCEDURE — 97110 THERAPEUTIC EXERCISES: CPT

## 2022-10-26 NOTE — DISCHARGE SUMMARY
[address] [facility]  PHYSICAL THERAPY  [] EVALUATION  [] DAILY NOTE (LAND) [] DAILY NOTE (AQUATIC ) [] PROGRESS NOTE [x] DISCHARGE NOTE    [] [facility]   [] [address]    [x] [facility]   [] Mikey Theodore    Date: 10/26/2022  Patient Name:  Jonh Gaming  :   MRN: [id number]  CSN: [id number]    Referring Practitioner Javier Alexander MD   Diagnosis Pain in right hip [M25.551]  Pain in left hip [M25.552]  Low back pain, unspecified [M54.50]    Treatment Diagnosis Bilat hip pain and low back pain   Date of Evaluation 22    Additional Pertinent History Arthritis, Osteopenia      Functional Outcome Measure Used Mod Oswestry   Functional Outcome Score 4 (22), 2 (22), 2 (10-26-22)      Insurance: Primary: Payor: [address] [place] [address],[address] /  /  / ,   Secondary: [facility]   Authorization Information: Unlimited visits. Aquatics and modalities except Ionto and HP/CP covered. Visit # 13, 4/10 for progress note   Visits Allowed: Unlimited   Recertification Date: None   Physician Follow-Up: None   Physician Orders:    History of Present Illness:      SUBJECTIVE: Patient reports saw the doctor after had testing done and was told just has degeneration in her spine and her hips. States was scheduled for an epidural and to do strengthening for therapy. Patient states is not sure wants to get the epidural and she will have to think about it. States the other day she stood up from surfaces without any pain and felt great all day. States her pain fluctuates day to day. Reports still pain in hips with going up steps. States her pain is not bad enough that it stops her from doing anything.      TREATMENT   Precautions: None   Pain: None right now    \"X in shaded column indicates activity completed today    *\" next to exercise/intervention indicates progression   Modalities Parameters/  Location  Notes                     Manual Therapy Time/Technique Notes   No sacral torsion today so no muscle energy. Also no leg length difference or hip rotation noted. X    Psoas release to bilat sides at 90 and 45 deg angles   PT able to go to same depths today with just minimal tightness bilat sides at 90 deg but no limitations         Exercise/  Intervention   Notes   Supine DKTC 2x10 - no production of pain or symptoms  Prone lying - no change  Prone prop - no change  Press ups x10 - no change                       Supine piriformis stretch and standing IT band stretch        Showed supine, seated and standing psoas stretches              Showed IT band stretch in supine figure four and same in sitting that can try if needed. Also showed how to angle leg differently with supine piriformis stretch   Went through osteoporosis handouts and showed proper bed mobility and sit to stand. Seated on BOSU: abdominal bracing                                Marching                                LAQ                                Rows                                Horz abd                                Hip abd  With ball behind back:                Thoracic extension                Horz abd                Shoulder flex above head 10x  10x  10x  10x  10x  10x    10x  10x  10x 5 sec                       Sci-Fit 5 min Level 3     Total Gym Level H: squats                                  Single leg squats 10x  10x bilat               Pec stretch in doorway 3x 15 sec     Education on proper gait pattern and showed how tight psoas and weak glut med could affect her gait pattern    PT noted cont Trendelenberg on right with SLS but patient understanding more how to fix it.    SLS and step ups with right leg and working on tightening glut for stability 5x5 SLS  10x B step ups on 6\"         Stepping fwd into small lunge onto BOSU 10x bilat      Standing on blue foam: 3-way hip                                        Marching                                        Rotation Heel raises                                        Shoulder flex                                        HS curls                                         Diagonals 10x each B  10x B  10x  10x  10x  10x  10x                  Discussed HEP and educated patient on what she needs to continue with. Showed patient how to do posterior pelvic tilt with steps and see if has less pain with. X    Rockerboard 2 directions 10x each way   Balance 30 seconds each way   Balance on blue foam: heel to toe                                       Feet side by side 30 sec          Specific Interventions Next Treatment: No further therapy    Activity/Treatment Tolerance:  [x]  Patient tolerated treatment well  []  Patient limited by fatigue  []  Patient limited by pain   []  Patient limited by medical complications  []  Other:     Assessment: Patient has made good progress with therapy overall. She is having less pain and is able to control what she has most of the time. Her symptoms are not exacerbated by flexion or extension and so she just needs to continue with activity and strengthening in neutral. Her testing did not show any new information other than has degeneration in her spine and hips. Patient is not sure if is going to get an epidural due to her pain is not constant and she can control it. Patient has a full HEP to continue with and has been fully educated on postural and body mechanics adjustments to decrease pain on her back and hips. There is nothing new HEP wise that PT can add at this time past what patient has already been given. Patient also may just need time to let things heal from the stress put on her back several months ago. No more therapy needed at this time.     GOALS:  Patient Goal: To learn what to do to have less soreness and be able to be more active    Short Term Goals:  Time Frame: 4 weeks  1) New Goal: Patient to report 50% decrease in soreness/stiffness for ease with going up stairs with reciprocal pattern  [] Goal Met [x] Goal Not Met [x] Continue Goal [] Discontinue Goal  [] Revise Goal  Goal Assessment: Patient reports no change since last PN. 2) New Goal: Patient to demonstrate full left piriformis range without pain or tightness for ease with dressing  [] Goal Met [x] Goal Not Met [] Continue Goal [] Discontinue Goal  [x] Revise Goal  3) New Goal: Patient to report able to consistently abolish right leg pain for ease with long distance walking  [x] Goal Met [] Goal Not Met [] Continue Goal [x] Discontinue Goal  [] Revise Goal  Goal Assessment: Patient reports right leg symptoms are good but now her pain is in her left hip. 5) New Goal: Patient to fully progress strength program for general strengthening and for her osteopenia to help provide stability for her hips and back with daily activity and lifting  [x] Goal Met [] Goal Not Met [] Continue Goal [x] Discontinue Goal  [] Revise Goal      Long Term Goals:  Time Frame: 12 weeks  1) Patient to be independent with home program to perform all daily activity with less difficulty  [x] Goal Met [] Goal Not Met [] Continue Goal [x] Discontinue Goal  [] Revise Goal  Goal Assessment: Patient independent with HEP      Patient Education:   [x]  HEP/Education Completed: Continue with HEP and call with any questions  echoecho Access Code:  []  No new Education completed  [x]  Reviewed Prior HEP      [x]  Patient verbalized and/or demonstrated understanding of education provided. []  Patient unable to verbalize and/or demonstrate understanding of education provided. Will continue education. []  Barriers to learning: None    PLAN:  []  Plan of care initiated. Plan to see patient 2 times per week for 12 weeks to address the treatment planned outlined above.   []  Continue with current plan of care  []  Modify plan of care as follows:  Decrease therapy to 1x/week for 2-4 weeks  []  Hold pending physician visit  [x]  Discharge    Time In 56 Time Out 1100   Timed Code Minutes: 30 min   Total Treatment Time: 30 min       Electronically Signed by: [place] [id number]

## 2022-12-05 ENCOUNTER — TELEPHONE (OUTPATIENT)
Dept: FAMILY MEDICINE CLINIC | Age: 72
End: 2022-12-05

## 2023-01-17 ENCOUNTER — TELEPHONE (OUTPATIENT)
Dept: FAMILY MEDICINE CLINIC | Age: 73
End: 2023-01-17

## 2023-01-30 NOTE — TELEPHONE ENCOUNTER
Prolia SOB received from [employer] covers the Medicare Part B deductible, coinsurance and 100% of the excess charges.     Pt contacted and appt scheduled for 01- @ 8:00AM for Prolia injection

## 2023-01-31 ENCOUNTER — NURSE ONLY (OUTPATIENT)
Dept: FAMILY MEDICINE CLINIC | Age: 73
End: 2023-01-31

## 2023-01-31 DIAGNOSIS — Z78.0 ASYMPTOMATIC POSTMENOPAUSAL STATE: ICD-10-CM

## 2023-01-31 DIAGNOSIS — M81.0 AGE-RELATED OSTEOPOROSIS WITHOUT CURRENT PATHOLOGICAL FRACTURE: Primary | ICD-10-CM

## 2023-01-31 NOTE — PROGRESS NOTES
Administrations This Visit       denosumab (PROLIA) SC injection 60 mg       Admin Date  01/31/2023  08:39 Action  Given Dose  60 mg Route  SubCUTAneous Site  Arm Left Administered By  Ken Page MA    Ordering Provider: Sharrell Ormond, MD    [address]: [id number]    Lot#: [id number]    : AMGEN    Patient Supplied?: No                Patient tolerated well

## 2023-02-03 ENCOUNTER — HOSPITAL ENCOUNTER (OUTPATIENT)
Dept: INFUSION THERAPY | Age: 73
Discharge: HOME | End: 2023-02-03
Payer: MEDICARE

## 2023-02-03 ENCOUNTER — OFFICE VISIT (OUTPATIENT)
Dept: ONCOLOGY | Age: 73
End: 2023-02-03
Payer: MEDICARE

## 2023-02-03 VITALS
OXYGEN SATURATION: 97 % | WEIGHT: 151.4 LBS | RESPIRATION RATE: 20 BRPM | HEART RATE: 84 BPM | SYSTOLIC BLOOD PRESSURE: 159 MMHG | HEIGHT: 64 IN | DIASTOLIC BLOOD PRESSURE: 70 MMHG | TEMPERATURE: 97.9 F | BODY MASS INDEX: 25.85 KG/M2

## 2023-02-03 VITALS
DIASTOLIC BLOOD PRESSURE: 70 MMHG | TEMPERATURE: 97.9 F | HEART RATE: 84 BPM | BODY MASS INDEX: 25.85 KG/M2 | WEIGHT: 151.4 LBS | OXYGEN SATURATION: 97 % | RESPIRATION RATE: 20 BRPM | SYSTOLIC BLOOD PRESSURE: 159 MMHG | HEIGHT: 64 IN

## 2023-02-03 DIAGNOSIS — Z12.31 SCREENING MAMMOGRAM FOR BREAST CANCER: ICD-10-CM

## 2023-02-03 DIAGNOSIS — Z85.3 HISTORY OF BREAST CANCER: Primary | ICD-10-CM

## 2023-02-03 DIAGNOSIS — Z17.0 MALIGNANT NEOPLASM OF UPPER-OUTER QUADRANT OF RIGHT BREAST IN FEMALE, ESTROGEN RECEPTOR POSITIVE (HCC): ICD-10-CM

## 2023-02-03 DIAGNOSIS — C50.411 MALIGNANT NEOPLASM OF UPPER-OUTER QUADRANT OF RIGHT BREAST IN FEMALE, ESTROGEN RECEPTOR POSITIVE (HCC): ICD-10-CM

## 2023-02-03 DIAGNOSIS — Z79.811 PROPHYLACTIC USE OF ANASTROZOLE (ARIMIDEX): ICD-10-CM

## 2023-02-03 PROCEDURE — 99214 OFFICE O/P EST MOD 30 MIN: CPT | Performed by: NURSE PRACTITIONER

## 2023-02-03 PROCEDURE — 1090F PRES/ABSN URINE INCON ASSESS: CPT | Performed by: NURSE PRACTITIONER

## 2023-02-03 PROCEDURE — 3017F COLORECTAL CA SCREEN DOC REV: CPT | Performed by: NURSE PRACTITIONER

## 2023-02-03 PROCEDURE — G8427 DOCREV CUR MEDS BY ELIG CLIN: HCPCS | Performed by: NURSE PRACTITIONER

## 2023-02-03 PROCEDURE — G8399 PT W/DXA RESULTS DOCUMENT: HCPCS | Performed by: NURSE PRACTITIONER

## 2023-02-03 PROCEDURE — G8417 CALC BMI ABV UP PARAM F/U: HCPCS | Performed by: NURSE PRACTITIONER

## 2023-02-03 PROCEDURE — 1123F ACP DISCUSS/DSCN MKR DOCD: CPT | Performed by: NURSE PRACTITIONER

## 2023-02-03 PROCEDURE — 1036F TOBACCO NON-USER: CPT | Performed by: NURSE PRACTITIONER

## 2023-02-03 PROCEDURE — 99211 OFF/OP EST MAY X REQ PHY/QHP: CPT

## 2023-02-03 PROCEDURE — G8484 FLU IMMUNIZE NO ADMIN: HCPCS | Performed by: NURSE PRACTITIONER

## 2023-02-03 NOTE — PATIENT INSTRUCTIONS
Schedule mammogram after 6/23/2023   Return to clinic in 6 mos to see [address] [facility],[address]

## 2023-02-03 NOTE — PROGRESS NOTES
[facility], [address], [address] [facility] [address]  [address] [address]  Dept: [phone]  Dept Fax: [phone]: [phone]      Visit Date:2/3/2023     Joan Patel is a 67 y.o. female who presents today for:   Chief Complaint   Patient presents with    Follow-up     Malignant neoplasm of upper-outer quadrant of right breast in female, estrogen receptor positive ([facility] [address]        HPI:   Joan Patel is a 67 y.o. female who follows in our office with history of breast cancer. HPI per previous note in our office:  The patient had annual mammogram in October 2019. It showed possible irregular density with architectural distortion in the left breast.  Additional compression and lateromedial views were recommended as well as left breast ultrasound. There was no sonographic correlate for the irregular left breast mammographic abnormality. Stereotactic guided biopsy was recommended. In addition ultrasound revealed an 0.8 cm x 0.7 cm oval lesion in the left breast upper outer aspect. Differential diagnosis included complex cyst or a solid mass. Diagnostic aspiration or possible core biopsy was recommended. Left breast core needle biopsy on October 23, 2019 showed atypical ductal hyperplasia, atypical lobular hyperplasia. On November 7, 2019 the patient underwent left breast lumpectomy that showed only focal atypical ductal hyperplasia, focal atypical lobular hyperplasia. On July 15, 2020 the patient underwent bilateral breast MRI, it showed to 1.9 cm x 1.1 cm irregular spiculated mass in the right breast at the 10 o'clock position. There were no abnormal looking axillary lymph nodes. Ultrasound of the right breast on July 23, 2020 confirmed the presence of irregular mass.   The same day the patient underwent ultrasound-guided biopsy which showed invasive ductal carcinoma, grade 2, estrogen receptor positive in 100% of cells, progesterone receptor positive and 60% of cells, HER-2 by IHC was equivocal 2+ but FISH was nonamplified  She  met with the surgeon Dr. Carly Cohn to discuss further surgical management. She decided to proceed with right lumpectomy. Due to age of 69 and clinically negative lymph node sentinel lymph node biopsy was omitted. Final pathology report showed: [place] Breast, right, lumpectomy:    Invasive ductal carcinoma, [place] grade 1, stage pT1c NX. Ductal carcinoma in situ, low nuclear grade, cribriform and   micropapillary types. Cranial margin positive for invasive carcinoma. Changes consistent with previous biopsy site. Fibrocystic changes including usual ductal hyperplasia, microcyst       formation, dense fibrosis, and columnar cell change. B.  Breast, new lateral margin, excision:    Changes consistent with previous biopsy site. Microcalcifications. Negative for malignancy. Oncotype DX Breast Cancer Assay (RT-PCR) performed by [employer]   Breast Cancer Recurrence Score:   12     Due to positive cranial margin on August 24, 2020 she underwent reexcision of the cranial margin, it was negative for malignancy. She has had genetic testing by [employer] [facility] Next -Expanded panel , no clinically significant mutations were detected. he patient has completed postlumpectomy radiation treatment. She received total cumulative dose of 4005 cGy in 15 fractions. She completed radiation treatment on October 28, 2020      Interval History 2/3/2023:   The patient presents to the office today for follow up and evaluation of history of breast cancer. The patient reports she continues on Arimidex daily. She denies hot flashes, arthralgias. Weight stable. She reports chronic hip/back pain. Last mammogram 6/23/2022 with benign findings. She denies fever/chills, s/s infections, headaches, dizziness, cough, SOB, CP, heart palpitations, abdominal pain, N/V/C/D, peripheral edema, peripheral neuropathy, s/s bleeding. PMH, SH, and FH:   I reviewed the patient's medication and allergy lists as noted on the electronic medical record. The PMH, SH, and FH were also reviewed as noted on the EMR.         Past Medical History:   Diagnosis Date    Acquired hypothyroidism 5/25/2017    Atypical ductal hyperplasia, breast 2019    Left breast ADH, ALH, RAdial scar    Atypical lobular hyperplasia (ALH) of left breast 11/4/2019    BRCA1 negative     neg    BRCA2 negative     neg    Breast cancer ([facility] [address] 2020    cancer    Genetic testing     breast negative    History of therapeutic radiation 2020    right breast 15 treatments    Hyperlipidemia     Invasive ductal carcinoma of breast ([facility] [address] 07/2020    right    Osteopenia 5/25/2017    Osteoporosis 12/29/2016      Past Surgical History:   Procedure Laterality Date    APPENDECTOMY  1997    BREAST BIOPSY Right 1980    benign    BREAST BIOPSY Left 2019    ADH ALH Radial SCar    BREAST BIOPSY Right 2020    cancer    BREAST LUMPECTOMY Right 8/12/2020    RIGHT BREAST LUMPECTOMY WITH PRIOR NEEDLE LOC PLACEMENT and new lateral margin performed by Ezekiel Lama MD at [address] [facility] [address] LUMPECTOMY Right 8/24/2020    RE-EXCISION CARNIAL MARGIN RIGHT BREAST performed by Ezekiel Lama MD at [address] LUMPECTOMY Left 2019    BREAST SURGERY Left 11/7/2019    LEFT BREAST EXCISIONAL BIOPSY WITH NEEDLE LOCALIZATION PLACEMENT performed by Ezekiel Lama MD at [address])  1997    ruptured ovarian cyst    SACHI [address] BIOPSY RIGHT  7/23/2020    SACH[address] RIGHT 7/23/2020 Maggie Colindres MD [address]  child    US BREAST FINE NEEDLE ASPIRATION Left 2000    US GUIDED NEEDLE LOC OF RIGHT BREAST  8/12/2020    US GUIDED NEEDLE LOC OF RIGHT BREAST 8/12/2020 [facility] rt & Lt      Family History   Problem Relation Age of Onset    Diabetes Mother     Heart Disease Mother         afib and pacer    High Blood Pressure Mother     Breast Cancer Mother 46    Breast Cancer Sister 62        negative genetic testing for breast    Dementia Maternal Uncle         alzheimer's    Dementia Maternal Aunt         alzheimer's    Colon Cancer Niece 39    COPD Father     No Known Problems Brother     No Known Problems Brother     Ovarian Cancer Neg Hx       Social History     Tobacco Use    Smoking status: Former     Packs/day: 0.25     Years: 20.00     Pack years: 5.00     Types: Cigarettes     Quit date: 1986     Years since quittin.1    Smokeless tobacco: Never   Substance Use Topics    Alcohol use: No     Alcohol/week: 0.0 standard drinks      Current Outpatient Medications   Medication Sig Dispense Refill    anastrozole (ARIMIDEX) 1 MG tablet TAKE 1 TABLET EVERY DAY 90 tablet 3    atorvastatin (LIPITOR) 20 MG tablet TAKE 1 TABLET EVERY DAY 90 tablet 3    Calcium-Magnesium-Vitamin D (CITRACAL CALCIUM+D PO) Take by mouth 2 times daily      Ubiquinol 100 MG CAPS Take by mouth daily      aspirin EC 81 MG EC tablet Take 1 tablet by mouth daily 30 tablet 3    Misc Natural Products (GLUCOSAMINE CHOND CMP ADVANCED PO) Take by mouth (Patient not taking: Reported on 2/3/2023)      Glucosamine 750 MG TABS Take by mouth in the morning and at bedtime (Patient not taking: Reported on 2/3/2023)       No current facility-administered medications for this visit. No Known Allergies       Review of Systems:   Review of Systems   Pertinent review of systems noted in HPI, all other ROS negative. Objective:   Physical Exam   BP (!) 159/70 (Site: Left Upper Arm, Position: Sitting, Cuff Size: Medium Adult)   Pulse 84   Temp 97.9 °F (36.6 °C) (Oral)   Resp 20   Ht 5' 4\" (1.626 m)   Wt 151 lb 6.4 oz (68.7 kg)   SpO2 97%   BMI 25.99 kg/m²    General appearance: No apparent distress, calm and cooperative. HEENT: Pupils equal, round, and reactive to light. Conjunctivae/corneas clear.  Oral mucosa moist  Neck: Supple, with full range of motion. Trachea midline. Respiratory:  Normal respiratory effort. Clear to auscultation all lung fields. Cardiovascular: RRR, S1/S2  Abdomen: Soft, non-tender, non-distended with active BS  Musculoskeletal: No clubbing, cyanosis or edema bilaterally. She is able to ambulate in office  Skin: Skin color, texture, turgor normal.  No visible rashes or lesions. Neurologic:  Neurovascularly intact without any focal sensory/motor deficits. Cranial nerves: II-XII intact, grossly non-focal.  Psychiatric: Alert and oriented x 3, thought content appropriate, normal insight  Capillary Refill: < 3 seconds   Peripheral Pulses: +2 palpable      Imaging Studies and Labs:   CBC:   Lab Results   Component Value Date    WBC 6.2 07/25/2022    HGB 14.0 07/25/2022    HCT 43.7 07/25/2022    MCV 94.6 07/25/2022     07/25/2022     BMP:   Lab Results   Component Value Date/Time     07/25/2022 07:30 AM    K 4.4 07/25/2022 07:30 AM     07/25/2022 07:30 AM    CO2 27 07/25/2022 07:30 AM    BUN 10 07/25/2022 07:30 AM    CREATININE 0.8 07/25/2022 07:30 AM    GLUCOSE 100 07/13/2022 09:22 AM    CALCIUM 8.9 07/25/2022 07:30 AM      LFT:   Lab Results   Component Value Date    ALT 11 07/25/2022    AST 15 07/25/2022    ALKPHOS 68 07/25/2022    BILITOT 0.3 07/25/2022         Assessment and Plan:     1. Malignant neoplasm of upper-outer quadrant of right breast in female, estrogen receptor positive ([facility] [address]  Mammogram 10/2019 (+) possible irregular density in left breast.  Biopsy (+) atypical ductal hyperplasia, atypical lobular hyperplasia. S/p lumpectomy 11/7/2019 with similar findings on pathology. S/p bilateral breast MRI 7/15/2020 (+) 1.9 cm x 1.1 cm irregular spiculated mass in right breast.  Biopsy (+) invasive ductal carcinoma, grade 2, ER (+), CT (+). 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   S/p re-excision of cranial margin, negative for malignancy. She completed post-lumpectomy radiation treatment on 10/28/2020 with total cumulative dose of 4005 cGy in 15 fractions. She started Arimidex in November 2020 up until May when she developed hip/lower back pain. Arimidex held for 1 month, pain continued. Bone scan obtained with findings of degenerative arthropathic changes. She restarted Arimidex every other day and transitioned to daily at last appnt. 2. Screening mammogram for breast cancer  She is due for screening mammogram.  Order placed. - [facility] RUSS DIGITAL SCREEN BILATERAL; Future    3. History of breast cancer  Management of breast cancer survivors who have completed active treatment and have no evidence of disease involve cancer surveillance and lifestyle modifications including pursuing a healthy exercise regimen, minimizing alcohol intake, and refraining from smoking. Survivor follow up includes updated history and regular physical examination. Radiological imaging to screen for distant recurrence is completed as needed according to patient report of new symptoms that may suggest disease recurrence or metastases. Symptoms suspicious for recurrence or metastases include:    Constitutional symptoms - anorexia, weight loss, malaise, fatigue, insomnia. Bone pain  Pulmonary symptoms - persistent cough or dyspnea (at rest or with exertion)  Neurological symptoms - headache, nausea, vomiting, confusion, weakness, numbness/tingling  Gastrointestinal symptoms - RUQ pain, change in bowel/bladder habits, presence of bloody or tarry stools. No s/s recurrence from today's examination. No palpable lumps/masses, nipple changes, edema to chest wall/UEs.  - [facility] RUSS DIGITAL SCREEN BILATERAL; Future    4. Prophylactic use of anastrozole (Arimidex)  She continues on Arimidex and tolerates well. Continue Arimidex. Return in about 6 months (around 8/3/2023). All patient questions answered. Pt voiced understanding.  Patient agreed with treatment plan. Follow up as directed. Patient instructed to call for questions or concerns. Electronically signed by   JORDEN Simmons CNP     I spent a total of 31 minutes on the day of the visit.

## 2023-02-04 DIAGNOSIS — E78.5 HYPERLIPIDEMIA: ICD-10-CM

## 2023-02-06 RX ORDER — ATORVASTATIN CALCIUM 20 MG/1
TABLET, FILM COATED ORAL
Qty: 90 TABLET | Refills: 3 | Status: SHIPPED | OUTPATIENT
Start: 2023-02-06

## 2023-06-26 ENCOUNTER — TELEPHONE (OUTPATIENT)
Dept: FAMILY MEDICINE CLINIC | Age: 73
End: 2023-06-26

## 2023-06-26 ENCOUNTER — HOSPITAL ENCOUNTER (OUTPATIENT)
Dept: MAMMOGRAPHY | Age: 73
Discharge: HOME | End: 2023-06-26
Payer: MEDICARE

## 2023-06-26 DIAGNOSIS — Z85.3 HISTORY OF BREAST CANCER: ICD-10-CM

## 2023-06-26 DIAGNOSIS — Z12.31 SCREENING MAMMOGRAM FOR BREAST CANCER: ICD-10-CM

## 2023-06-26 DIAGNOSIS — E78.00 PURE HYPERCHOLESTEROLEMIA: Primary | ICD-10-CM

## 2023-06-26 DIAGNOSIS — E03.9 ACQUIRED HYPOTHYROIDISM: ICD-10-CM

## 2023-06-26 PROCEDURE — 77063 BREAST TOMOSYNTHESIS BI: CPT

## 2023-07-24 ENCOUNTER — TELEPHONE (OUTPATIENT)
Dept: FAMILY MEDICINE CLINIC | Age: 73
End: 2023-07-24

## 2023-07-24 NOTE — TELEPHONE ENCOUNTER
Chloé SOB received from [address] has met her Medicare Part B deductible and her Medicare Supplement Plan F covers the Medicare Part B deductible, coinsurance and 100% of the excess charges.     Pt contacted - Appointment for Prolia schedule 07/31/2023 @ 10:00AM.

## 2023-07-25 ENCOUNTER — HOSPITAL ENCOUNTER (OUTPATIENT)
Age: 73
Discharge: HOME | End: 2023-07-25
Payer: MEDICARE

## 2023-07-25 DIAGNOSIS — E78.00 PURE HYPERCHOLESTEROLEMIA: ICD-10-CM

## 2023-07-25 DIAGNOSIS — E03.9 ACQUIRED HYPOTHYROIDISM: ICD-10-CM

## 2023-07-25 LAB
ALBUMIN SERPL BCG-MCNC: 4.1 G/DL (ref 3.5–5.1)
ALP SERPL-CCNC: 65 U/L (ref 38–126)
ALT SERPL W/O P-5'-P-CCNC: 11 U/L (ref 11–66)
ANION GAP SERPL CALC-SCNC: 12 MEQ/L (ref 8–16)
AST SERPL-CCNC: 18 U/L (ref 5–40)
BILIRUB SERPL-MCNC: 0.4 MG/DL (ref 0.3–1.2)
BUN SERPL-MCNC: 21 MG/DL (ref 7–22)
CALCIUM SERPL-MCNC: 10 MG/DL (ref 8.5–10.5)
CHLORIDE SERPL-SCNC: 106 MEQ/L (ref 98–111)
CHOLEST SERPL-MCNC: 128 MG/DL (ref 100–199)
CO2 SERPL-SCNC: 26 MEQ/L (ref 23–33)
CREAT SERPL-MCNC: 0.8 MG/DL (ref 0.4–1.2)
DEPRECATED RDW RBC AUTO: 45.1 FL (ref 35–45)
ERYTHROCYTE [DISTWIDTH] IN BLOOD BY AUTOMATED COUNT: 12.6 % (ref 11.5–14.5)
GFR SERPL CREATININE-BSD FRML MDRD: > 60 ML/MIN/1.73M2
GLUCOSE FASTING: 101 MG/DL (ref 70–108)
HCT VFR BLD AUTO: 45.3 % (ref 37–47)
HDLC SERPL-MCNC: 57 MG/DL
HGB BLD-MCNC: 14.2 GM/DL (ref 12–16)
LDLC SERPL CALC-MCNC: 62 MG/DL
MCH RBC QN AUTO: 30.2 PG (ref 26–33)
MCHC RBC AUTO-ENTMCNC: 31.3 GM/DL (ref 32.2–35.5)
MCV RBC AUTO: 96.4 FL (ref 81–99)
PLATELET # BLD AUTO: 257 THOU/MM3 (ref 130–400)
PMV BLD AUTO: 10.8 FL (ref 9.4–12.4)
POTASSIUM SERPL-SCNC: 4.8 MEQ/L (ref 3.5–5.2)
PROT SERPL-MCNC: 7.1 G/DL (ref 6.1–8)
RBC # BLD AUTO: 4.7 MILL/MM3 (ref 4.2–5.4)
SODIUM SERPL-SCNC: 144 MEQ/L (ref 135–145)
TRIGL SERPL-MCNC: 46 MG/DL (ref 0–199)
TSH SERPL DL<=0.005 MIU/L-ACNC: 1 UIU/ML (ref 0.4–4.2)
WBC # BLD AUTO: 6.6 THOU/MM3 (ref 4.8–10.8)

## 2023-07-25 PROCEDURE — 85027 COMPLETE CBC AUTOMATED: CPT

## 2023-07-25 PROCEDURE — 80061 LIPID PANEL: CPT

## 2023-07-25 PROCEDURE — 80053 COMPREHEN METABOLIC PANEL: CPT

## 2023-07-25 PROCEDURE — 84443 ASSAY THYROID STIM HORMONE: CPT

## 2023-07-25 PROCEDURE — 36415 COLL VENOUS BLD VENIPUNCTURE: CPT

## 2023-07-28 SDOH — ECONOMIC STABILITY: TRANSPORTATION INSECURITY
IN THE PAST 12 MONTHS, HAS LACK OF TRANSPORTATION KEPT YOU FROM MEETINGS, WORK, OR FROM GETTING THINGS NEEDED FOR DAILY LIVING?: NO

## 2023-07-28 SDOH — ECONOMIC STABILITY: FOOD INSECURITY: WITHIN THE PAST 12 MONTHS, THE FOOD YOU BOUGHT JUST DIDN'T LAST AND YOU DIDN'T HAVE MONEY TO GET MORE.: NEVER TRUE

## 2023-07-28 SDOH — ECONOMIC STABILITY: INCOME INSECURITY: HOW HARD IS IT FOR YOU TO PAY FOR THE VERY BASICS LIKE FOOD, HOUSING, MEDICAL CARE, AND HEATING?: NOT HARD AT ALL

## 2023-07-28 SDOH — HEALTH STABILITY: PHYSICAL HEALTH: ON AVERAGE, HOW MANY MINUTES DO YOU ENGAGE IN EXERCISE AT THIS LEVEL?: 20 MIN

## 2023-07-28 SDOH — HEALTH STABILITY: PHYSICAL HEALTH: ON AVERAGE, HOW MANY DAYS PER WEEK DO YOU ENGAGE IN MODERATE TO STRENUOUS EXERCISE (LIKE A BRISK WALK)?: 2 DAYS

## 2023-07-28 SDOH — ECONOMIC STABILITY: FOOD INSECURITY: WITHIN THE PAST 12 MONTHS, YOU WORRIED THAT YOUR FOOD WOULD RUN OUT BEFORE YOU GOT MONEY TO BUY MORE.: NEVER TRUE

## 2023-07-28 SDOH — ECONOMIC STABILITY: HOUSING INSECURITY
IN THE LAST 12 MONTHS, WAS THERE A TIME WHEN YOU DID NOT HAVE A STEADY PLACE TO SLEEP OR SLEPT IN A SHELTER (INCLUDING NOW)?: NO

## 2023-07-28 ASSESSMENT — PATIENT HEALTH QUESTIONNAIRE - PHQ9
SUM OF ALL RESPONSES TO PHQ QUESTIONS 1-9: 0
SUM OF ALL RESPONSES TO PHQ QUESTIONS 1-9: 0
2. FEELING DOWN, DEPRESSED OR HOPELESS: 0
SUM OF ALL RESPONSES TO PHQ9 QUESTIONS 1 & 2: 0
SUM OF ALL RESPONSES TO PHQ QUESTIONS 1-9: 0
1. LITTLE INTEREST OR PLEASURE IN DOING THINGS: 0
SUM OF ALL RESPONSES TO PHQ QUESTIONS 1-9: 0

## 2023-07-28 ASSESSMENT — LIFESTYLE VARIABLES
HOW MANY STANDARD DRINKS CONTAINING ALCOHOL DO YOU HAVE ON A TYPICAL DAY: 0
HOW OFTEN DO YOU HAVE A DRINK CONTAINING ALCOHOL: NEVER
HOW OFTEN DO YOU HAVE A DRINK CONTAINING ALCOHOL: 1
HOW OFTEN DO YOU HAVE SIX OR MORE DRINKS ON ONE OCCASION: 1
HOW MANY STANDARD DRINKS CONTAINING ALCOHOL DO YOU HAVE ON A TYPICAL DAY: PATIENT DOES NOT DRINK

## 2023-07-30 NOTE — PROGRESS NOTES
Medicare Annual Wellness Visit    Juliette Goldberg is here for Medicare AWV      Assessment & Plan   Medicare annual wellness visit, subsequent  Pure hypercholesterolemia  Acquired hypothyroidism  Age-related osteoporosis without current pathological fracture  Arthritis of both hips  Arthritis of low back  Malignant neoplasm of upper-outer quadrant of right breast in female, estrogen receptor positive ([address])  Onychomycosis of toenail  History of tobacco use  Use of anastrozole (Arimidex)  S/P lumpectomy, right breast  Atypical lobular hyperplasia (ALH) of left breast  Senile osteoporosis  -     denosumab (PROLIA) SC injection 60 mg; 60 mg, SubCUTAneous, ONCE, 1 dose, On Mon 7/31/23 at 1045  Menopause  -     denosumab (PROLIA) SC injection 60 mg; 60 mg, SubCUTAneous, ONCE, 1 dose, On Mon 7/31/23 at 1045    Recommendations for Preventive Services Due: see orders and patient instructions/AVS.  Recommended screening schedule for the next 5-10 years is provided to the patient in written form: see Patient Instructions/AVS.     Return in 1 year (on 8/1/2024) for AWV. Subjective       Well Adult Physical: Patient here for a comprehensive physical exam.The patient reports problems - cholesterol and thyroid issues are controlled on current medications. Dexa scan in 2015 shows osteoporosis. Taking prolia injections. Do you take any herbs or supplements that were not prescribed by a doctor? no Are you taking calcium supplements? yes Are you taking aspirin daily? yes   History:  Any STD's in the past? none    In  she was diagnosed with a mass in her breast and had a core needle biopsy which was suspicious and subsequent lumpectomy which showed no cancer. She has a strong family history of breast cancer so she had a breast MRI in July 2020. She was found to have an abnormal mass in the right breast and had a lumpectomy that showed an invasive ductal carcinoma.    She is still seeing oncology and surgery for follow

## 2023-07-31 ENCOUNTER — OFFICE VISIT (OUTPATIENT)
Dept: FAMILY MEDICINE CLINIC | Age: 73
End: 2023-07-31
Payer: MEDICARE

## 2023-07-31 VITALS
SYSTOLIC BLOOD PRESSURE: 124 MMHG | HEIGHT: 64 IN | RESPIRATION RATE: 18 BRPM | OXYGEN SATURATION: 99 % | HEART RATE: 83 BPM | TEMPERATURE: 97 F | WEIGHT: 147 LBS | BODY MASS INDEX: 25.1 KG/M2 | DIASTOLIC BLOOD PRESSURE: 82 MMHG

## 2023-07-31 DIAGNOSIS — E03.9 ACQUIRED HYPOTHYROIDISM: ICD-10-CM

## 2023-07-31 DIAGNOSIS — Z00.00 MEDICARE ANNUAL WELLNESS VISIT, SUBSEQUENT: Primary | ICD-10-CM

## 2023-07-31 DIAGNOSIS — M81.0 AGE-RELATED OSTEOPOROSIS WITHOUT CURRENT PATHOLOGICAL FRACTURE: ICD-10-CM

## 2023-07-31 DIAGNOSIS — N60.92 ATYPICAL LOBULAR HYPERPLASIA (ALH) OF LEFT BREAST: ICD-10-CM

## 2023-07-31 DIAGNOSIS — M81.0 SENILE OSTEOPOROSIS: ICD-10-CM

## 2023-07-31 DIAGNOSIS — Z17.0 MALIGNANT NEOPLASM OF UPPER-OUTER QUADRANT OF RIGHT BREAST IN FEMALE, ESTROGEN RECEPTOR POSITIVE (HCC): ICD-10-CM

## 2023-07-31 DIAGNOSIS — E78.00 PURE HYPERCHOLESTEROLEMIA: ICD-10-CM

## 2023-07-31 DIAGNOSIS — Z87.891 HISTORY OF TOBACCO USE: ICD-10-CM

## 2023-07-31 DIAGNOSIS — C50.411 MALIGNANT NEOPLASM OF UPPER-OUTER QUADRANT OF RIGHT BREAST IN FEMALE, ESTROGEN RECEPTOR POSITIVE (HCC): ICD-10-CM

## 2023-07-31 DIAGNOSIS — M16.0 ARTHRITIS OF BOTH HIPS: ICD-10-CM

## 2023-07-31 DIAGNOSIS — M47.819 ARTHRITIS OF LOW BACK: ICD-10-CM

## 2023-07-31 DIAGNOSIS — B35.1 ONYCHOMYCOSIS OF TOENAIL: ICD-10-CM

## 2023-07-31 DIAGNOSIS — Z79.811 USE OF ANASTROZOLE (ARIMIDEX): ICD-10-CM

## 2023-07-31 DIAGNOSIS — Z98.890 S/P LUMPECTOMY, RIGHT BREAST: ICD-10-CM

## 2023-07-31 DIAGNOSIS — Z78.0 MENOPAUSE: ICD-10-CM

## 2023-07-31 PROCEDURE — 1123F ACP DISCUSS/DSCN MKR DOCD: CPT | Performed by: FAMILY MEDICINE

## 2023-07-31 PROCEDURE — G0439 PPPS, SUBSEQ VISIT: HCPCS | Performed by: FAMILY MEDICINE

## 2023-07-31 PROCEDURE — 3017F COLORECTAL CA SCREEN DOC REV: CPT | Performed by: FAMILY MEDICINE

## 2023-07-31 PROCEDURE — 96372 THER/PROPH/DIAG INJ SC/IM: CPT | Performed by: FAMILY MEDICINE

## 2023-07-31 NOTE — PROGRESS NOTES
Administrations This Visit       denosumab (PROLIA) SC injection 60 mg       Admin Date  07/31/2023  10:31 Action  Given Dose  60 mg Route  SubCUTAneous Site  Arm Left Administered By  Zari Rosen CMA ([facility])    Ordering Provider: Corine Benvaides MD    NDC: 14612-165-22    Lot#: [id number]    : AMGEN    Patient Supplied?: No                    Patient instructed to remain in clinic for 20 minutes after injection and was advised to report any adverse reaction to me immediately.

## 2023-07-31 NOTE — PATIENT INSTRUCTIONS
[employer] online. You need 9695-9910 mg of calcium and 3004-0785 IU of vitamin D per day. It is possible to meet your calcium requirement with diet alone, but a vitamin D supplement is usually necessary to meet this goal.  When exposed to the sun, use a sunscreen that protects against both UVA and UVB radiation with an SPF of 30 or greater. Reapply every 2 to 3 hours or after sweating, drying off with a towel, or swimming. Always wear a seat belt when traveling in a car. Always wear a helmet when riding a bicycle or motorcycle.

## 2023-08-04 ENCOUNTER — OFFICE VISIT (OUTPATIENT)
Dept: ONCOLOGY | Age: 73
End: 2023-08-04
Payer: MEDICARE

## 2023-08-04 ENCOUNTER — HOSPITAL ENCOUNTER (OUTPATIENT)
Dept: INFUSION THERAPY | Age: 73
Discharge: HOME | End: 2023-08-04
Payer: MEDICARE

## 2023-08-04 VITALS
TEMPERATURE: 98.1 F | WEIGHT: 146.6 LBS | DIASTOLIC BLOOD PRESSURE: 74 MMHG | SYSTOLIC BLOOD PRESSURE: 168 MMHG | HEIGHT: 64 IN | RESPIRATION RATE: 16 BRPM | BODY MASS INDEX: 25.03 KG/M2 | HEART RATE: 61 BPM | OXYGEN SATURATION: 97 %

## 2023-08-04 VITALS
TEMPERATURE: 98.1 F | DIASTOLIC BLOOD PRESSURE: 74 MMHG | HEART RATE: 61 BPM | OXYGEN SATURATION: 97 % | SYSTOLIC BLOOD PRESSURE: 168 MMHG | RESPIRATION RATE: 16 BRPM

## 2023-08-04 DIAGNOSIS — Z79.811 PROPHYLACTIC USE OF ANASTROZOLE (ARIMIDEX): ICD-10-CM

## 2023-08-04 DIAGNOSIS — Z08 ENCOUNTER FOR FOLLOW-UP SURVEILLANCE OF BREAST CANCER: ICD-10-CM

## 2023-08-04 DIAGNOSIS — Z17.0 MALIGNANT NEOPLASM OF UPPER-OUTER QUADRANT OF RIGHT BREAST IN FEMALE, ESTROGEN RECEPTOR POSITIVE (HCC): Primary | ICD-10-CM

## 2023-08-04 DIAGNOSIS — Z85.3 ENCOUNTER FOR FOLLOW-UP SURVEILLANCE OF BREAST CANCER: ICD-10-CM

## 2023-08-04 DIAGNOSIS — C50.411 MALIGNANT NEOPLASM OF UPPER-OUTER QUADRANT OF RIGHT BREAST IN FEMALE, ESTROGEN RECEPTOR POSITIVE (HCC): Primary | ICD-10-CM

## 2023-08-04 PROCEDURE — 99214 OFFICE O/P EST MOD 30 MIN: CPT | Performed by: NURSE PRACTITIONER

## 2023-08-04 PROCEDURE — G8399 PT W/DXA RESULTS DOCUMENT: HCPCS | Performed by: NURSE PRACTITIONER

## 2023-08-04 PROCEDURE — 1090F PRES/ABSN URINE INCON ASSESS: CPT | Performed by: NURSE PRACTITIONER

## 2023-08-04 PROCEDURE — 99211 OFF/OP EST MAY X REQ PHY/QHP: CPT

## 2023-08-04 PROCEDURE — G8427 DOCREV CUR MEDS BY ELIG CLIN: HCPCS | Performed by: NURSE PRACTITIONER

## 2023-08-04 PROCEDURE — 1123F ACP DISCUSS/DSCN MKR DOCD: CPT | Performed by: NURSE PRACTITIONER

## 2023-08-04 PROCEDURE — G8417 CALC BMI ABV UP PARAM F/U: HCPCS | Performed by: NURSE PRACTITIONER

## 2023-08-04 PROCEDURE — 1036F TOBACCO NON-USER: CPT | Performed by: NURSE PRACTITIONER

## 2023-08-04 PROCEDURE — 3017F COLORECTAL CA SCREEN DOC REV: CPT | Performed by: NURSE PRACTITIONER

## 2023-08-04 NOTE — PROGRESS NOTES
[facility] [address] [place] [address] Alvaro Kaminski 101 200  [address] [facility] [address] [address]  Dept: [phone]  Dept Fax: [phone]: [phone]      Visit Date:8/4/2023     Lucas Back is a 75 y.o. female who presents today for:   Chief Complaint   Patient presents with    Follow-up     History of breast cancer        HPI:   Lucas Back is a 75 y.o. female who follows in our office with history of breast cancer. HPI per previous note in our office:  The patient had annual mammogram in October 2019. It showed possible irregular density with architectural distortion in the left breast.  Additional compression and lateromedial views were recommended as well as left breast ultrasound. There was no sonographic correlate for the irregular left breast mammographic abnormality. Stereotactic guided biopsy was recommended. In addition ultrasound revealed an 0.8 cm x 0.7 cm oval lesion in the left breast upper outer aspect. Differential diagnosis included complex cyst or a solid mass. Diagnostic aspiration or possible core biopsy was recommended. Left breast core needle biopsy on October 23, 2019 showed atypical ductal hyperplasia, atypical lobular hyperplasia. On November 7, 2019 the patient underwent left breast lumpectomy that showed only focal atypical ductal hyperplasia, focal atypical lobular hyperplasia. On July 15, 2020 the patient underwent bilateral breast MRI, it showed to 1.9 cm x 1.1 cm irregular spiculated mass in the right breast at the 10 o'clock position. There were no abnormal looking axillary lymph nodes. Ultrasound of the right breast on July 23, 2020 confirmed the presence of irregular mass.   The same day the patient underwent ultrasound-guided biopsy which showed invasive ductal carcinoma, grade 2, estrogen receptor positive in 100% of cells, progesterone receptor positive and 60% of cells, HER-2 by IHC was equivocal 2+ but FISH was nonamplified  She  met with the surgeon

## 2023-08-25 RX ORDER — ANASTROZOLE 1 MG/1
TABLET ORAL
Qty: 90 TABLET | Refills: 1 | Status: SHIPPED | OUTPATIENT
Start: 2023-08-25

## 2023-09-07 NOTE — PROGRESS NOTES
[facility] [address] [facility] [address], [address] [facility] [address]  Normaraceli Patel  Dept: [phone]  Dept Fax: [phone]: [phone]    Visit Date:11/16/2021     Sabrina Anand is a 66 y.o. female who presents today for:   Chief Complaint   Patient presents with    Follow-up     Malignant neoplasm of upper-outer quadrant of right breast in female, estrogen receptor positive         HPI:   This is a 27-year-old female with h/o right breast cancer. The patient had annual mammogram in October 2019. It showed possible irregular density with architectural distortion in the left breast.  Additional compression and lateromedial views were recommended as well as left breast ultrasound. There was no sonographic correlate for the irregular left breast mammographic abnormality. Stereotactic guided biopsy was recommended. In addition ultrasound revealed an 0.8 cm x 0.7 cm oval lesion in the left breast upper outer aspect. Differential diagnosis included complex cyst or a solid mass. Diagnostic aspiration or possible core biopsy was recommended. Left breast core needle biopsy on October 23, 2019 showed atypical ductal hyperplasia, atypical lobular hyperplasia. On November 7, 2019 the patient underwent left breast lumpectomy that showed only focal atypical ductal hyperplasia, focal atypical lobular hyperplasia. On July 15, 2020 the patient underwent bilateral breast MRI, it showed to 1.9 cm x 1.1 cm irregular spiculated mass in the right breast at the 10 o'clock position. There were no abnormal looking axillary lymph nodes. Ultrasound of the right breast on July 23, 2020 confirmed the presence of irregular mass.   The same day the patient underwent ultrasound-guided biopsy which showed invasive ductal carcinoma, grade 2, estrogen receptor positive in 100% of cells, progesterone receptor positive and 60% of cells, HER-2 by IHC was equivocal 2+ but FISH was nonamplified  She  met with the surgeon  Kylah to discuss further surgical management. She decided to proceed with right lumpectomy. Due to age of 69 and clinically negative lymph node sentinel lymph node biopsy was omitted. Final pathology report showed: Cecy García, right, lumpectomy:    Invasive ductal carcinoma, [place] grade 1, stage pT1c NX.    Ductal carcinoma in situ, low nuclear grade, cribriform and   micropapillary types.     Cranial margin positive for invasive carcinoma.    Changes consistent with previous biopsy site.       Fibrocystic changes including usual ductal hyperplasia, microcyst       formation, dense fibrosis, and columnar cell change. BVpauldhaval García, new lateral margin, excision:    Changes consistent with previous biopsy site.       Microcalcifications.    Negative for malignancy. Oncotype DX Breast Cancer Assay (RT-PCR) performed by [employer]   Breast Cancer Recurrence Score:   12     Due to positive cranial margin on August 24, 2020 she underwent reexcision of the cranial margin, it was negative for malignancy. She has had genetic testing by [facility] Cancer Next -Expanded panel , no clinically significant mutations were detected. he patient has completed postlumpectomy radiation treatment. She received total cumulative dose of 4005 cGy in 15 fractions. She completed radiation treatment on October 28, 2020      Interim history on 11/16/2021:  The patient presents to the medical oncology clinic for 6 months follow up while receiving adjuvant hormonal therapy with Arimidex. She reports that she tolerates Arimidex well. Minimal arthralgia no hot flashes. The patient denies any complaints related to her breasts.   No hospitalizations since last visit with NIALL chang   Past Medical History:   Diagnosis Date    Acquired hypothyroidism 5/25/2017    Atypical ductal hyperplasia, breast 2019    Left breast ADH, ALH, RAdial scar    Atypical lobular hyperplasia (ALH) of left breast 11/4/2019    Breast cancer [facility]) 2020    cancer    Genetic testing     breast negative    History of therapeutic radiation 2020    right breast 15 treatments    Hyperlipidemia     Invasive ductal carcinoma of breast ([address] 07/2020    right    Osteopenia 5/25/2017    Osteoporosis 12/29/2016      Past Surgical History:   Procedure Laterality Date    APPENDECTOMY  1997    BREAST BIOPSY Right 1980    benign    BREAST BIOPSY Left 2019    ADH ALH Radial SCar    BREAST BIOPSY Right 2020    cancer    BREAST LUMPECTOMY Right 8/12/2020    RIGHT BREAST LUMPECTOMY WITH PRIOR NEEDLE LOC PLACEMENT and new lateral margin performed by Birgit Ramsey MD at [facility] Right 8/24/2020    RE-EXCISION CARNIAL MARGIN RIGHT BREAST performed by Birgit Ramsey MD at [address] [place] [address] BREAST LUMPECTOMY Left 2019    BREAST SURGERY Left 11/7/2019    LEFT BREAST EXCISIONAL BIOPSY WITH NEEDLE LOCALIZATION PLACEMENT performed by Birgit Ramsey MD at [place]    ruptured ovarian cyst    SACHI [address] BIOPSY RIGHT  7/23/2020    SACHI [address] RIGHT 7/23/2020 Smooth Fagan MD [facility]    OVARY REMOVAL      TONSILLECTOMY  child    US BREAST FINE NEEDLE ASPIRATION Left 2000    US GUIDED NEEDLE LOC OF RIGHT BREAST  8/12/2020    US GUIDED NEEDLE LOC OF RIGHT BREAST 8/12/2020 [facility] rt & Lt      Family History   Problem Relation Age of Onset    Diabetes Mother     Heart Disease Mother         afib and pacer    High Blood Pressure Mother     Breast Cancer Mother 46    Breast Cancer Sister 62        negative genetic testing for breast    Dementia Maternal Uncle         alzheimer's    Dementia Maternal Aunt         alzheimer's    Colon Cancer Niece 39    COPD Father     No Known Problems Brother     No Known Problems Brother     Ovarian Cancer Neg Hx       Social History     Tobacco Use    Smoking status: Former Smoker     Packs/day: 0.25     Years: 20.00     Pack years: 5.00     Quit date: 1986     Years since quittin.8    Smokeless tobacco: Never Used   Substance Use Topics    Alcohol use: No     Alcohol/week: 0.0 standard drinks      Current Outpatient Medications   Medication Sig Dispense Refill    Calcium-Magnesium-Vitamin D (CITRACAL CALCIUM+D PO) Take by mouth 2 times daily      anastrozole (ARIMIDEX) 1 MG tablet TAKE 1 TABLET EVERY DAY 90 tablet 3    atorvastatin (LIPITOR) 20 MG tablet TAKE 1 TABLET EVERY DAY 90 tablet 3    Ubiquinol (QUNOL COQ10/UBIQUINOL/EFRAIN) 100 MG CAPS Take by mouth daily      aspirin EC 81 MG EC tablet Take 1 tablet by mouth daily 30 tablet 3    Cholecalciferol (VITAMIN D3) 50 MCG ( UT) CAPS Take by mouth daily (Patient not taking: Reported on 2021)       No current facility-administered medications for this visit. No Known Allergies   Health Maintenance   Topic Date Due    Annual Wellness Visit (AWV)  2020    COVID-19 Vaccine (3 - Moderna risk 4-dose series) 2021    Breast cancer screen  2022    Lipid screen  2022    TSH testing  2022    DTaP/Tdap/Td vaccine (2 - Td or Tdap) 2023    Colon cancer screen colonoscopy  2026    DEXA (modify frequency per FRAX score)  Completed    Flu vaccine  Completed    Shingles Vaccine  Completed    Pneumococcal 65+ years Vaccine  Completed    Hepatitis C screen  Completed    Hepatitis A vaccine  Aged Out    Hepatitis B vaccine  Aged Out    Hib vaccine  Aged Out    Meningococcal (ACWY) vaccine  Aged Out        Subjective:   Review of Systems   Constitutional: Negative for activity change, appetite change, fatigue and fever. HENT: Negative for congestion, dental problem, facial swelling, hearing loss, mouth sores, nosebleeds, sore throat, tinnitus and trouble swallowing. Eyes: Negative for discharge and visual disturbance. Respiratory: Negative for cough, chest tightness, shortness of breath and wheezing. Cardiovascular: Negative for chest pain, palpitations and leg swelling. Gastrointestinal: Negative for abdominal distention, abdominal pain, blood in stool, constipation, diarrhea, nausea, rectal pain and vomiting. Endocrine: Negative for cold intolerance, polydipsia and polyuria. Genitourinary: Negative for decreased urine volume, difficulty urinating, dysuria, flank pain, hematuria and urgency. Musculoskeletal: Negative for arthralgias, back pain, gait problem, joint swelling, myalgias and neck stiffness. Skin: Negative for color change, rash and wound. Neurological: Negative for dizziness, tremors, seizures, speech difficulty, weakness, light-headedness, numbness and headaches. Hematological: Negative for adenopathy. Does not bruise/bleed easily. Psychiatric/Behavioral: Negative for confusion and sleep disturbance. The patient is not nervous/anxious. Objective:   Physical Exam  Vitals reviewed. Constitutional:       General: She is not in acute distress. Appearance: She is well-developed. HENT:      Head: Normocephalic. Mouth/Throat:      Pharynx: No oropharyngeal exudate. Eyes:      General: No scleral icterus. Right eye: No discharge. Left eye: No discharge. Pupils: Pupils are equal, round, and reactive to light. Neck:      Thyroid: No thyromegaly. Vascular: No JVD. Trachea: No tracheal deviation. Cardiovascular:      Rate and Rhythm: Normal rate. Heart sounds: Normal heart sounds. No murmur heard. No friction rub. No gallop. Pulmonary:      Effort: Pulmonary effort is normal. No respiratory distress. Breath sounds: Normal breath sounds. No stridor. No wheezing or rales. Chest:      Chest wall: No tenderness. Breasts:      Right: Skin change (status post lumpectomy. Incision is nicely healed) present. Left: Skin change (incision post lumpectomy) present.        Abdominal:      General: Bowel sounds are normal. There is no distension. Palpations: Abdomen is soft. There is no mass. Tenderness: There is no abdominal tenderness. There is no rebound. Musculoskeletal:         General: Normal range of motion. Cervical back: Normal range of motion and neck supple. Comments: Good range of motion in all four extremities. Lymphadenopathy:      Cervical: No cervical adenopathy. Skin:     General: Skin is warm. Findings: No erythema or rash. Neurological:      Mental Status: She is alert and oriented to person, place, and time. Cranial Nerves: No cranial nerve deficit. Motor: No abnormal muscle tone. Deep Tendon Reflexes: Reflexes are normal and symmetric. Psychiatric:         Behavior: Behavior normal.         Thought Content: Thought content normal.         Judgment: Judgment normal.         BP (!) 149/70 (Site: Left Upper Arm, Position: Sitting, Cuff Size: Medium Adult)   Pulse 89   Temp 99 °F (37.2 °C) (Oral)   Resp 16   Ht 5' 4\" (1.626 m)   Wt 152 lb 3.2 oz (69 kg)   SpO2 97%   BMI 26.13 kg/m²      ECOG status is 0    Imaging studies and labs:   No results found. Lab Results   Component Value Date    WBC 5.1 07/02/2021    HGB 14.8 07/02/2021    HCT 45.8 07/02/2021    MCV 92.7 07/02/2021     07/02/2021       Chemistry        Component Value Date/Time     07/02/2021 0945    K 4.0 07/02/2021 0945     07/02/2021 0945    CO2 28 07/02/2021 0945    BUN 14 07/02/2021 0945    CREATININE 0.7 07/02/2021 0945        Component Value Date/Time    CALCIUM 9.6 07/02/2021 0945    ALKPHOS 75 08/18/2021 1150    AST 16 08/18/2021 1150    ALT 12 08/18/2021 1150    BILITOT 0.3 08/18/2021 1150        Bone density in July 2020 showed:  IMPRESSION: OSTEOPENIA    Mammogram in June 2021 showed:  1. No mammographic evidence of malignancy. A 1 year screening mammogram is recommended.           Assessment/Plan:   1.   Stage IA (cT1c, cN0, cM0, G2, ER+, MD+, HER2-) right breast cancer  S/p lumpectomy. She had Oncotype DX assay that showed recurrence score 12. RS 25 or below identifies women with low risk of disease recurrence for whom chemotherapy is unlikely to provide a benefit. The patient belongs  to that group. Next,  she proceeded with postlumpectomy radiation treatment. She received 4005 cGy in 15 fractions. She completed radiation treatment in October 2020.  2. Adjuvant hormonal therapy with Aromatase Inhibitor. Started in November 2020. Tolerates Arimidex very well. Minimal arthralgia. Denies having any vasomotor symptoms, no mood changes. Denies any concerns related to her breasts. There is no evidence of disease recurrence on today's physical examination. The patient denies any symptoms suspicious for metastatic disease. Mammogram in June 2021 showed benign findings. Instructed to continue Arimidex. 3.. Cancer surveillance. Management of breast cancer survivors who have completed active treatment and have no evidence of disease are cancer surveillance, lifestyle modifications including pursuing healthy regular exercise program, minimizing alcohol intake, and refraining from smoking. Survivor follow-up will include updated history, regular physical examination. Radiologic imaging to screen for distant recurrence will be not performed unless the patient will develop new symptoms. Symptoms suspicious for recurrent /metastic disease include:  ?Constitutional symptoms - Anorexia, weight loss, malaise, fatigue, insomnia. ? Bone pain  ? Pulmonary symptoms - persistent cough or dyspnea (at rest or with exertion). ?Neurologic symptoms - Headache, nausea, vomiting, confusion, weakness, numbness or tingling. ?Gastrointestinal symptoms - Right upper quadrant pain, change in bowel habits, presence of bloody or tarry stools. 4. Osteopenia. The patient had bone density study that showed osteopenia.   We discussed lifestyle measures include adequate calcium and vitamin D, exercise, smoking cessation, fall prevention, and avoidance of heavy alcohol use. In general, 1200 mg of elemental calcium daily, total diet plus supplement, and 800 international units of vitamin D daily are recommended       Diagnosis Orders   1. Malignant neoplasm of upper-outer quadrant of right breast in female, estrogen receptor positive ([facility] [address]     2. Status post right breast lumpectomy     3. Prophylactic use of anastrozole (Arimidex)     4. Encounter for follow-up surveillance of breast cancer          Plan:   No follow-ups on file. Orders Placed:   No orders of the defined types were placed in this encounter. Medications Prescribed:   No orders of the defined types were placed in this encounter. Unknown

## 2024-01-16 ENCOUNTER — TELEPHONE (OUTPATIENT)
Dept: FAMILY MEDICINE CLINIC | Age: 74
End: 2024-01-16

## 2024-01-18 ENCOUNTER — TELEPHONE (OUTPATIENT)
Dept: FAMILY MEDICINE CLINIC | Age: 74
End: 2024-01-18

## 2024-01-18 NOTE — TELEPHONE ENCOUNTER
Prolia SOB received from [employer] Pt's medicare supplement Plan F covers the medicare Part B deductible, coinsurance and 100% of the excess charges.

## 2024-01-23 ENCOUNTER — TELEPHONE (OUTPATIENT)
Dept: FAMILY MEDICINE CLINIC | Age: 74
End: 2024-01-23

## 2024-01-31 ENCOUNTER — NURSE ONLY (OUTPATIENT)
Dept: FAMILY MEDICINE CLINIC | Age: 74
End: 2024-01-31
Payer: MEDICARE

## 2024-01-31 DIAGNOSIS — M81.0 AGE-RELATED OSTEOPOROSIS WITHOUT CURRENT PATHOLOGICAL FRACTURE: Primary | ICD-10-CM

## 2024-01-31 PROCEDURE — 99999 PR OFFICE/OUTPT VISIT,PROCEDURE ONLY: CPT | Performed by: FAMILY MEDICINE

## 2024-01-31 PROCEDURE — 96372 THER/PROPH/DIAG INJ SC/IM: CPT | Performed by: FAMILY MEDICINE

## 2024-01-31 NOTE — PROGRESS NOTES
Pt came in today for her Prolia injection. She has also complained of chills, body aches, and a sore throat, and low grade fever that began Saturday night. She has gradually been getting better. She has only  been gargling salt water to relieve her sx's. She took an at home COVID test that came back inconclusive. She was tested twice for COVID during her nurse visit today and those came back invalid. She declined testing a 3rd time since her sx's were resolving. Writer advised her to call the office if her sx's persist or worsen. She verbalized understanding.       Administrations This Visit       denosumab (PROLIA) SC injection 60 mg       Admin Date  01/31/2024  14:20 Action  Given Dose  60 mg Route  SubCUTAneous Site  Arm Left Administered By  Flaca Cardenas MA    Ordering Provider: Nalini Ashford MD    NDC: 40005-340-62    Lot#: [id number]    : [employer]    Patient Supplied?: No                 Pt tolerated appropriately.

## 2024-02-06 PROBLEM — D48.62 NEOPLASM OF UNCERTAIN BEHAVIOR OF LEFT BREAST: Status: RESOLVED | Noted: 2019-11-04 | Resolved: 2024-02-06

## 2024-02-07 ENCOUNTER — OFFICE VISIT (OUTPATIENT)
Dept: ONCOLOGY | Age: 74
End: 2024-02-07
Payer: MEDICARE

## 2024-02-07 ENCOUNTER — HOSPITAL ENCOUNTER (OUTPATIENT)
Dept: INFUSION THERAPY | Age: 74
Discharge: HOME | End: 2024-02-07
Payer: MEDICARE

## 2024-02-07 VITALS
SYSTOLIC BLOOD PRESSURE: 145 MMHG | RESPIRATION RATE: 18 BRPM | TEMPERATURE: 98.4 F | DIASTOLIC BLOOD PRESSURE: 65 MMHG | HEART RATE: 86 BPM

## 2024-02-07 VITALS
HEART RATE: 86 BPM | OXYGEN SATURATION: 97 % | WEIGHT: 146 LBS | DIASTOLIC BLOOD PRESSURE: 65 MMHG | SYSTOLIC BLOOD PRESSURE: 145 MMHG | TEMPERATURE: 98.4 F | HEIGHT: 64 IN | RESPIRATION RATE: 18 BRPM | BODY MASS INDEX: 24.92 KG/M2

## 2024-02-07 DIAGNOSIS — Z17.0 MALIGNANT NEOPLASM OF UPPER-OUTER QUADRANT OF RIGHT BREAST IN FEMALE, ESTROGEN RECEPTOR POSITIVE (HCC): Primary | ICD-10-CM

## 2024-02-07 DIAGNOSIS — C50.411 MALIGNANT NEOPLASM OF UPPER-OUTER QUADRANT OF RIGHT BREAST IN FEMALE, ESTROGEN RECEPTOR POSITIVE (HCC): Primary | ICD-10-CM

## 2024-02-07 DIAGNOSIS — Z51.81 ENCOUNTER FOR MONITORING AROMATASE INHIBITOR THERAPY: ICD-10-CM

## 2024-02-07 DIAGNOSIS — M25.50 AROMATASE INHIBITOR-ASSOCIATED ARTHRALGIA: ICD-10-CM

## 2024-02-07 DIAGNOSIS — M85.851 OSTEOPENIA OF BOTH HIPS: ICD-10-CM

## 2024-02-07 DIAGNOSIS — T45.1X5A AROMATASE INHIBITOR-ASSOCIATED ARTHRALGIA: ICD-10-CM

## 2024-02-07 DIAGNOSIS — M85.852 OSTEOPENIA OF BOTH HIPS: ICD-10-CM

## 2024-02-07 DIAGNOSIS — Z79.811 ENCOUNTER FOR MONITORING AROMATASE INHIBITOR THERAPY: ICD-10-CM

## 2024-02-07 DIAGNOSIS — Z12.31 ENCOUNTER FOR SCREENING MAMMOGRAM FOR MALIGNANT NEOPLASM OF BREAST: ICD-10-CM

## 2024-02-07 PROCEDURE — 1123F ACP DISCUSS/DSCN MKR DOCD: CPT | Performed by: INTERNAL MEDICINE

## 2024-02-07 PROCEDURE — 1036F TOBACCO NON-USER: CPT | Performed by: INTERNAL MEDICINE

## 2024-02-07 PROCEDURE — 1090F PRES/ABSN URINE INCON ASSESS: CPT | Performed by: INTERNAL MEDICINE

## 2024-02-07 PROCEDURE — 99211 OFF/OP EST MAY X REQ PHY/QHP: CPT

## 2024-02-07 PROCEDURE — G8399 PT W/DXA RESULTS DOCUMENT: HCPCS | Performed by: INTERNAL MEDICINE

## 2024-02-07 PROCEDURE — G8484 FLU IMMUNIZE NO ADMIN: HCPCS | Performed by: INTERNAL MEDICINE

## 2024-02-07 PROCEDURE — G8427 DOCREV CUR MEDS BY ELIG CLIN: HCPCS | Performed by: INTERNAL MEDICINE

## 2024-02-07 PROCEDURE — G8417 CALC BMI ABV UP PARAM F/U: HCPCS | Performed by: INTERNAL MEDICINE

## 2024-02-07 PROCEDURE — 99214 OFFICE O/P EST MOD 30 MIN: CPT | Performed by: INTERNAL MEDICINE

## 2024-02-07 PROCEDURE — 3017F COLORECTAL CA SCREEN DOC REV: CPT | Performed by: INTERNAL MEDICINE

## 2024-02-07 RX ORDER — ANASTROZOLE 1 MG/1
TABLET ORAL
Qty: 90 TABLET | Refills: 3 | Status: SHIPPED | OUTPATIENT
Start: 2024-02-07

## 2024-02-07 NOTE — PATIENT INSTRUCTIONS
1) arimidex sent to mail order pharmacy  2) mammo for June- please schedule in glandorf  3) dexa in July- please schedule  4) RTC in 6 mo for H&P

## 2024-02-07 NOTE — PROGRESS NOTES
systemic endocrine therapy would be both preventative against current cancer returning metastatic sites (drops rates 50% from baseline) as well as a new hormone positive breast cancer forming (drops risk 50% from baseline), using an antiestrogen medication at least 5 years postop (11/2025), possibly up to 10 years.  SE particularly menopausal symptoms including fatigue, hair thinning, hot flashes, hypercholesterolemia, myalgias, bone thinning, small but concerning risk of VTE and discussed s/s and plan if s/s appeared.   -8/28/2020  genetic testing [employer] with no known clinically actionable alteration detected. VUS of DEONDRE [id number]    -ordered Arimidex 90 x 3 refills   -Last mammogram 6/26/2023 WNL, next mammogram due 6/2024, ordered for [place], continue with breast/chest wall clinical exams  - return to clinic in approximately 6 months after mammogram for AI tox check afterwards.     2) hx of osteoporosis, in b/l hips and L spine, while currently on endocrine therapy with AI  -7/18/2022 DEXA: L1-L4 T-score: -1.7, Femoral Neck Left: T-score: -1.8, Femoral Neck Right: T-score: -2.3, IMPRESSION: This qualifies as osteopenia based on the WHO criteria. The patient is at a medium risk for fracture.   10 year probability of major osteoporotic fracture: 24%  10 year probability of hip fracture: 12%    We discussed lifestyle measures include adequate calcium and vitamin D, exercise, smoking cessation, fall prevention, and avoidance of heavy alcohol use.   -discussed taking Ca (1200 mg/day) and vit D (1000 units/day) -taking  -she is on Prolia (last injection 1/31/2024- being managed by PCP)  -discussed getting repeat DEXA 7/2024-  Therapy recommendations need to be tailored to each individual patient. Using the World Health Organization (WHO) FRAX absolute fracture algorithm, the National Osteoporosis Foundation recommends beginning pharmacological therapy in postmenopausal women   and men over the age

## 2024-02-29 DIAGNOSIS — Z17.0 MALIGNANT NEOPLASM OF UPPER-OUTER QUADRANT OF RIGHT BREAST IN FEMALE, ESTROGEN RECEPTOR POSITIVE (HCC): ICD-10-CM

## 2024-02-29 DIAGNOSIS — Z51.81 ENCOUNTER FOR MONITORING AROMATASE INHIBITOR THERAPY: ICD-10-CM

## 2024-02-29 DIAGNOSIS — C50.411 MALIGNANT NEOPLASM OF UPPER-OUTER QUADRANT OF RIGHT BREAST IN FEMALE, ESTROGEN RECEPTOR POSITIVE (HCC): ICD-10-CM

## 2024-02-29 DIAGNOSIS — T45.1X5A AROMATASE INHIBITOR-ASSOCIATED ARTHRALGIA: ICD-10-CM

## 2024-02-29 DIAGNOSIS — M25.50 AROMATASE INHIBITOR-ASSOCIATED ARTHRALGIA: ICD-10-CM

## 2024-02-29 DIAGNOSIS — Z79.811 ENCOUNTER FOR MONITORING AROMATASE INHIBITOR THERAPY: ICD-10-CM

## 2024-02-29 RX ORDER — ANASTROZOLE 1 MG/1
TABLET ORAL
Qty: 90 TABLET | Refills: 3 | Status: SHIPPED | OUTPATIENT
Start: 2024-02-29

## 2024-03-01 DIAGNOSIS — E78.5 HYPERLIPIDEMIA: ICD-10-CM

## 2024-03-01 RX ORDER — ATORVASTATIN CALCIUM 20 MG/1
TABLET, FILM COATED ORAL
Qty: 90 TABLET | Refills: 1 | Status: SHIPPED | OUTPATIENT
Start: 2024-03-01

## 2024-03-01 NOTE — TELEPHONE ENCOUNTER
Last visit- 7/31/2023  Next visit- 4/1/2024    Requested Prescriptions     Pending Prescriptions Disp Refills    atorvastatin (LIPITOR) 20 MG tablet 90 tablet 3     Sig: TAKE 1 TABLET DAILY

## 2024-05-09 ENCOUNTER — TELEPHONE (OUTPATIENT)
Dept: FAMILY MEDICINE CLINIC | Age: 74
End: 2024-05-09

## 2024-05-09 DIAGNOSIS — K62.5 BLOOD PER RECTUM: Primary | ICD-10-CM

## 2024-05-09 NOTE — TELEPHONE ENCOUNTER
Pt called reporting that she noticed this week that she is have blood when she wipes after having a bowel movement.   She is unsure if she has hemorrhoids, and has no pain when passing BM.  Pt does not see GI, but is open to seeing one if dr eden thinks its best   Please advise no apts

## 2024-05-25 ENCOUNTER — HOSPITAL ENCOUNTER (OUTPATIENT)
Age: 74
Discharge: HOME | End: 2024-05-25
Payer: MEDICARE

## 2024-05-25 ENCOUNTER — HOSPITAL ENCOUNTER (OUTPATIENT)
Dept: GENERAL RADIOLOGY | Age: 74
Discharge: HOME | End: 2024-05-25
Payer: MEDICARE

## 2024-05-25 DIAGNOSIS — K59.09 CHRONIC CONSTIPATION: ICD-10-CM

## 2024-05-25 PROCEDURE — 74018 RADEX ABDOMEN 1 VIEW: CPT

## 2024-06-03 ENCOUNTER — TELEPHONE (OUTPATIENT)
Dept: FAMILY MEDICINE CLINIC | Age: 74
End: 2024-06-03

## 2024-06-03 NOTE — TELEPHONE ENCOUNTER
Patient states she was diagnosed w/ ulcertive colitiis through GI. Asking if she needs a referral to see a nutrionist/dietician. Please advise.

## 2024-06-03 NOTE — TELEPHONE ENCOUNTER
If she would like to see one then would need a referral.  GI does not normally recommend it however with UC

## 2024-06-20 ENCOUNTER — HOSPITAL ENCOUNTER (OUTPATIENT)
Age: 74
Discharge: HOME | End: 2024-06-20
Attending: INTERNAL MEDICINE
Payer: MEDICARE

## 2024-06-20 ENCOUNTER — HOSPITAL ENCOUNTER (OUTPATIENT)
Dept: CT IMAGING | Age: 74
Discharge: HOME | End: 2024-06-20
Attending: INTERNAL MEDICINE
Payer: MEDICARE

## 2024-06-20 DIAGNOSIS — K50.919 CROHN'S DISEASE WITH COMPLICATION, UNSPECIFIED GASTROINTESTINAL TRACT LOCATION (HCC): ICD-10-CM

## 2024-06-20 LAB
ALBUMIN SERPL BCP-MCNC: 3.4 GM/DL (ref 3.4–5)
ALP SERPL-CCNC: 52 U/L (ref 46–116)
ALT SERPL W P-5'-P-CCNC: 27 U/L (ref 14–63)
ANION GAP SERPL CALC-SCNC: 8 MEQ/L (ref 8–16)
AST SERPL W P-5'-P-CCNC: 20 U/L (ref 15–37)
BILIRUB SERPL-MCNC: 0.6 MG/DL (ref 0.2–1)
BUN SERPL-MCNC: 15 MG/DL (ref 7–18)
CALCIUM SERPL-MCNC: 9.1 MG/DL (ref 8.5–10.1)
CHLORIDE SERPL-SCNC: 108 MEQ/L (ref 98–107)
CO2 SERPL-SCNC: 31 MEQ/L (ref 21–32)
CREAT SERPL-MCNC: 0.9 MG/DL (ref 0.6–1.3)
GFR SERPL CREATININE-BSD FRML MDRD: 67 ML/MIN/1.73M2
GLUCOSE SERPL-MCNC: 92 MG/DL (ref 74–106)
HCT VFR BLD CALC: 43.5 % (ref 37–47)
HEMOGLOBIN: 14.1 GM/DL (ref 12–16)
MCH RBC QN AUTO: 30.7 PG (ref 26–32)
MCHC RBC AUTO-ENTMCNC: 32.4 GM/DL (ref 31–35)
MCV RBC AUTO: 94.8 FL (ref 81–99)
PDW BLD-RTO: 13 % (ref 11.5–14.9)
PLATELET # BLD AUTO: 249 THOU/MM3 (ref 130–400)
PMV BLD AUTO: 9.8 FL (ref 9.4–12.4)
POTASSIUM SERPL-SCNC: 3.9 MEQ/L (ref 3.5–5.1)
PROT SERPL-MCNC: 6.7 GM/DL (ref 6.4–8.2)
RBC # BLD: 4.59 MILL/MM3 (ref 4.1–5.3)
SODIUM SERPL-SCNC: 147 MEQ/L (ref 136–145)
WBC # BLD: 10.7 THOU/MM3 (ref 4.8–10.8)

## 2024-06-20 PROCEDURE — 74177 CT ABD & PELVIS W/CONTRAST: CPT

## 2024-06-20 PROCEDURE — 85027 COMPLETE CBC AUTOMATED: CPT

## 2024-06-20 PROCEDURE — 80053 COMPREHEN METABOLIC PANEL: CPT

## 2024-06-20 PROCEDURE — 2500000003 HC RX 250 WO HCPCS: Performed by: INTERNAL MEDICINE

## 2024-06-20 PROCEDURE — 6360000004 HC RX CONTRAST MEDICATION: Performed by: INTERNAL MEDICINE

## 2024-06-20 RX ADMIN — BARIUM SULFATE 450 ML: 1 SUSPENSION ORAL at 10:29

## 2024-06-20 RX ADMIN — BARIUM SULFATE 450 ML: 1 SUSPENSION ORAL at 10:28

## 2024-06-20 RX ADMIN — IOPAMIDOL 100 ML: 755 INJECTION, SOLUTION INTRAVENOUS at 10:26

## 2024-06-27 ENCOUNTER — HOSPITAL ENCOUNTER (OUTPATIENT)
Dept: MAMMOGRAPHY | Age: 74
Discharge: HOME | End: 2024-06-27
Attending: INTERNAL MEDICINE
Payer: MEDICARE

## 2024-06-27 VITALS — WEIGHT: 138 LBS | HEIGHT: 64 IN | BODY MASS INDEX: 23.56 KG/M2

## 2024-06-27 DIAGNOSIS — Z79.811 ENCOUNTER FOR MONITORING AROMATASE INHIBITOR THERAPY: ICD-10-CM

## 2024-06-27 DIAGNOSIS — Z12.31 ENCOUNTER FOR SCREENING MAMMOGRAM FOR MALIGNANT NEOPLASM OF BREAST: ICD-10-CM

## 2024-06-27 DIAGNOSIS — T45.1X5A AROMATASE INHIBITOR-ASSOCIATED ARTHRALGIA: ICD-10-CM

## 2024-06-27 DIAGNOSIS — M25.50 AROMATASE INHIBITOR-ASSOCIATED ARTHRALGIA: ICD-10-CM

## 2024-06-27 DIAGNOSIS — Z51.81 ENCOUNTER FOR MONITORING AROMATASE INHIBITOR THERAPY: ICD-10-CM

## 2024-06-27 DIAGNOSIS — Z17.0 MALIGNANT NEOPLASM OF UPPER-OUTER QUADRANT OF RIGHT BREAST IN FEMALE, ESTROGEN RECEPTOR POSITIVE (HCC): ICD-10-CM

## 2024-06-27 DIAGNOSIS — C50.411 MALIGNANT NEOPLASM OF UPPER-OUTER QUADRANT OF RIGHT BREAST IN FEMALE, ESTROGEN RECEPTOR POSITIVE (HCC): ICD-10-CM

## 2024-06-27 PROCEDURE — 77063 BREAST TOMOSYNTHESIS BI: CPT

## 2024-07-22 ENCOUNTER — HOSPITAL ENCOUNTER (OUTPATIENT)
Dept: WOMENS IMAGING | Age: 74
Discharge: HOME | End: 2024-07-22
Attending: INTERNAL MEDICINE
Payer: MEDICARE

## 2024-07-22 DIAGNOSIS — M25.50 AROMATASE INHIBITOR-ASSOCIATED ARTHRALGIA: ICD-10-CM

## 2024-07-22 DIAGNOSIS — Z51.81 ENCOUNTER FOR MONITORING AROMATASE INHIBITOR THERAPY: ICD-10-CM

## 2024-07-22 DIAGNOSIS — Z17.0 MALIGNANT NEOPLASM OF UPPER-OUTER QUADRANT OF RIGHT BREAST IN FEMALE, ESTROGEN RECEPTOR POSITIVE (HCC): ICD-10-CM

## 2024-07-22 DIAGNOSIS — C50.411 MALIGNANT NEOPLASM OF UPPER-OUTER QUADRANT OF RIGHT BREAST IN FEMALE, ESTROGEN RECEPTOR POSITIVE (HCC): ICD-10-CM

## 2024-07-22 DIAGNOSIS — Z79.811 ENCOUNTER FOR MONITORING AROMATASE INHIBITOR THERAPY: ICD-10-CM

## 2024-07-22 DIAGNOSIS — M85.852 OSTEOPENIA OF BOTH HIPS: ICD-10-CM

## 2024-07-22 DIAGNOSIS — T45.1X5A AROMATASE INHIBITOR-ASSOCIATED ARTHRALGIA: ICD-10-CM

## 2024-07-22 DIAGNOSIS — M85.851 OSTEOPENIA OF BOTH HIPS: ICD-10-CM

## 2024-07-22 PROCEDURE — 77080 DXA BONE DENSITY AXIAL: CPT

## 2024-07-29 ENCOUNTER — TELEPHONE (OUTPATIENT)
Dept: FAMILY MEDICINE CLINIC | Age: 74
End: 2024-07-29

## 2024-07-29 DIAGNOSIS — E78.00 PURE HYPERCHOLESTEROLEMIA: Primary | ICD-10-CM

## 2024-07-29 DIAGNOSIS — E03.9 ACQUIRED HYPOTHYROIDISM: ICD-10-CM

## 2024-07-29 NOTE — TELEPHONE ENCOUNTER
Prolia SOB received from [employer] - Pt has met her Medicare Part B deductible - Her Medicare Supplement Plan F will cover Part B deductible, coinsurance and 100% of the excess charges - Pt is scheduled to receive injection at her appt on 08/05/2024.

## 2024-07-29 NOTE — TELEPHONE ENCOUNTER
Orders entered for labs    
Patient aware and voiced understanding, no concerns voiced at this time.     
Patient called asking if she needed labs drawn prior to her appt on 8/5/24. Please advise.     Patient also wanting her Prolia done at the visit. Monalisa is looking to make sure the patient is ok to have this and will let writer know.   
Patient is ok to have her Prolia at her upcoming visit per alesia.     Please see message regarding labs, encounter was accidentally closed.   
Prolia insurance verification initiated thru [employer]  
DISPLAY PLAN FREE TEXT

## 2024-07-30 ENCOUNTER — HOSPITAL ENCOUNTER (OUTPATIENT)
Age: 74
Discharge: HOME | End: 2024-07-30
Payer: MEDICARE

## 2024-07-30 DIAGNOSIS — E03.9 ACQUIRED HYPOTHYROIDISM: ICD-10-CM

## 2024-07-30 DIAGNOSIS — E78.00 PURE HYPERCHOLESTEROLEMIA: ICD-10-CM

## 2024-07-30 LAB
ALBUMIN SERPL BCG-MCNC: 4.2 G/DL (ref 3.5–5.1)
ALP SERPL-CCNC: 66 U/L (ref 38–126)
ALT SERPL W/O P-5'-P-CCNC: 18 U/L (ref 11–66)
ANION GAP SERPL CALC-SCNC: 10 MEQ/L (ref 8–16)
AST SERPL-CCNC: 34 U/L (ref 5–40)
BILIRUB SERPL-MCNC: 0.5 MG/DL (ref 0.3–1.2)
BUN SERPL-MCNC: 13 MG/DL (ref 7–22)
CALCIUM SERPL-MCNC: 10 MG/DL (ref 8.5–10.5)
CHLORIDE SERPL-SCNC: 106 MEQ/L (ref 98–111)
CHOLEST SERPL-MCNC: 133 MG/DL (ref 100–199)
CO2 SERPL-SCNC: 28 MEQ/L (ref 23–33)
CREAT SERPL-MCNC: 0.7 MG/DL (ref 0.4–1.2)
DEPRECATED RDW RBC AUTO: 46.5 FL (ref 35–45)
ERYTHROCYTE [DISTWIDTH] IN BLOOD BY AUTOMATED COUNT: 13.2 % (ref 11.5–14.5)
GFR SERPL CREATININE-BSD FRML MDRD: 90 ML/MIN/1.73M2
GLUCOSE FASTING: 100 MG/DL (ref 70–108)
HCT VFR BLD AUTO: 44.2 % (ref 37–47)
HDLC SERPL-MCNC: 54 MG/DL
HGB BLD-MCNC: 14.1 GM/DL (ref 12–16)
LDLC SERPL CALC-MCNC: 60 MG/DL
MCH RBC QN AUTO: 30.6 PG (ref 26–33)
MCHC RBC AUTO-ENTMCNC: 31.9 GM/DL (ref 32.2–35.5)
MCV RBC AUTO: 95.9 FL (ref 81–99)
PLATELET # BLD AUTO: 235 THOU/MM3 (ref 130–400)
PMV BLD AUTO: 11 FL (ref 9.4–12.4)
POTASSIUM SERPL-SCNC: 4.9 MEQ/L (ref 3.5–5.2)
PROT SERPL-MCNC: 6.9 G/DL (ref 6.1–8)
RBC # BLD AUTO: 4.61 MILL/MM3 (ref 4.2–5.4)
SODIUM SERPL-SCNC: 144 MEQ/L (ref 135–145)
TRIGL SERPL-MCNC: 96 MG/DL (ref 0–199)
TSH SERPL DL<=0.005 MIU/L-ACNC: 1.02 UIU/ML (ref 0.4–4.2)
WBC # BLD AUTO: 6.5 THOU/MM3 (ref 4.8–10.8)

## 2024-07-30 PROCEDURE — 80053 COMPREHEN METABOLIC PANEL: CPT

## 2024-07-30 PROCEDURE — 85027 COMPLETE CBC AUTOMATED: CPT

## 2024-07-30 PROCEDURE — 80061 LIPID PANEL: CPT

## 2024-07-30 PROCEDURE — 84443 ASSAY THYROID STIM HORMONE: CPT

## 2024-07-30 PROCEDURE — 36415 COLL VENOUS BLD VENIPUNCTURE: CPT

## 2024-08-02 SDOH — ECONOMIC STABILITY: FOOD INSECURITY: WITHIN THE PAST 12 MONTHS, THE FOOD YOU BOUGHT JUST DIDN'T LAST AND YOU DIDN'T HAVE MONEY TO GET MORE.: NEVER TRUE

## 2024-08-02 SDOH — HEALTH STABILITY: PHYSICAL HEALTH: ON AVERAGE, HOW MANY MINUTES DO YOU ENGAGE IN EXERCISE AT THIS LEVEL?: 30 MIN

## 2024-08-02 SDOH — ECONOMIC STABILITY: FOOD INSECURITY: WITHIN THE PAST 12 MONTHS, YOU WORRIED THAT YOUR FOOD WOULD RUN OUT BEFORE YOU GOT MONEY TO BUY MORE.: NEVER TRUE

## 2024-08-02 SDOH — ECONOMIC STABILITY: INCOME INSECURITY: HOW HARD IS IT FOR YOU TO PAY FOR THE VERY BASICS LIKE FOOD, HOUSING, MEDICAL CARE, AND HEATING?: NOT HARD AT ALL

## 2024-08-02 SDOH — HEALTH STABILITY: PHYSICAL HEALTH
ON AVERAGE, HOW MANY DAYS PER WEEK DO YOU ENGAGE IN MODERATE TO STRENUOUS EXERCISE (LIKE A BRISK WALK)?: PATIENT DECLINED

## 2024-08-02 ASSESSMENT — LIFESTYLE VARIABLES
HOW MANY STANDARD DRINKS CONTAINING ALCOHOL DO YOU HAVE ON A TYPICAL DAY: PATIENT DOES NOT DRINK
HOW OFTEN DO YOU HAVE A DRINK CONTAINING ALCOHOL: 1
HOW OFTEN DO YOU HAVE SIX OR MORE DRINKS ON ONE OCCASION: 1
HOW MANY STANDARD DRINKS CONTAINING ALCOHOL DO YOU HAVE ON A TYPICAL DAY: 0
HOW OFTEN DO YOU HAVE A DRINK CONTAINING ALCOHOL: NEVER

## 2024-08-02 ASSESSMENT — PATIENT HEALTH QUESTIONNAIRE - PHQ9
SUM OF ALL RESPONSES TO PHQ QUESTIONS 1-9: 0
2. FEELING DOWN, DEPRESSED OR HOPELESS: NOT AT ALL
SUM OF ALL RESPONSES TO PHQ9 QUESTIONS 1 & 2: 0
SUM OF ALL RESPONSES TO PHQ QUESTIONS 1-9: 0
1. LITTLE INTEREST OR PLEASURE IN DOING THINGS: NOT AT ALL

## 2024-08-04 NOTE — PROGRESS NOTES
Medicare Annual Wellness Visit    Tonya Engle is here for No chief complaint on file.      Assessment & Plan   Pure hypercholesterolemia  Acquired hypothyroidism  Blood per rectum  Age-related osteoporosis without current pathological fracture  Arthritis of both hips  Arthritis of low back  Malignant neoplasm of upper-outer quadrant of right breast in female, estrogen receptor positive (HCC)  Onychomycosis of toenail  History of tobacco use  Use of anastrozole (Arimidex)  S/P lumpectomy, right breast    Recommendations for Preventive Services Due: see orders and patient instructions/AVS.  Recommended screening schedule for the next 5-10 years is provided to the patient in written form: see Patient Instructions/AVS.     No follow-ups on file.     Subjective       Well Adult Physical: Patient here for a comprehensive physical exam.The patient reports problems - cholesterol and thyroid issues are controlled on current medications.  *** Dexa scan in 2015 shows osteoporosis. Taking prolia injections.***  Do you take any herbs or supplements that were not prescribed by a doctor? no Are you taking calcium supplements? yes Are you taking aspirin daily? yes   History:  Any STD's in the past? none    In  she was diagnosed with a mass in her breast and had a core needle biopsy which was suspicious and subsequent lumpectomy which showed no cancer.  She has a strong family history of breast cancer so she had a breast MRI in July 2020.  She was found to have an abnormal mass in the right breast and had a lumpectomy that showed an invasive ductal carcinoma.   She is still seeing oncology and surgery for follow up.    ***    Back and hip pain worse in the spring since getting back on her feet.  No metastatic disease on bone scan, osteopenia on dexa.  Back on arimidex and prolia.  ***    Both hips are painful and in 9/2022 was found to have arthritis in the hips and low back.    PT was minimal relief.  Aleve prn is

## 2024-08-05 ENCOUNTER — OFFICE VISIT (OUTPATIENT)
Dept: FAMILY MEDICINE CLINIC | Age: 74
End: 2024-08-05
Payer: MEDICARE

## 2024-08-05 VITALS
RESPIRATION RATE: 16 BRPM | DIASTOLIC BLOOD PRESSURE: 82 MMHG | HEIGHT: 64 IN | SYSTOLIC BLOOD PRESSURE: 150 MMHG | TEMPERATURE: 97.3 F | HEART RATE: 80 BPM | BODY MASS INDEX: 24.77 KG/M2 | OXYGEN SATURATION: 98 % | WEIGHT: 145.06 LBS

## 2024-08-05 DIAGNOSIS — Z79.811 USE OF ANASTROZOLE (ARIMIDEX): ICD-10-CM

## 2024-08-05 DIAGNOSIS — K22.710 BARRETT'S ESOPHAGUS WITH LOW GRADE DYSPLASIA: ICD-10-CM

## 2024-08-05 DIAGNOSIS — E03.9 ACQUIRED HYPOTHYROIDISM: ICD-10-CM

## 2024-08-05 DIAGNOSIS — Z87.891 HISTORY OF TOBACCO USE: ICD-10-CM

## 2024-08-05 DIAGNOSIS — K52.9 INFLAMMATORY BOWEL DISEASE: ICD-10-CM

## 2024-08-05 DIAGNOSIS — K62.5 BLOOD PER RECTUM: ICD-10-CM

## 2024-08-05 DIAGNOSIS — C50.411 MALIGNANT NEOPLASM OF UPPER-OUTER QUADRANT OF RIGHT BREAST IN FEMALE, ESTROGEN RECEPTOR POSITIVE (HCC): ICD-10-CM

## 2024-08-05 DIAGNOSIS — Z00.00 MEDICARE ANNUAL WELLNESS VISIT, SUBSEQUENT: Primary | ICD-10-CM

## 2024-08-05 DIAGNOSIS — Z98.890 S/P LUMPECTOMY, RIGHT BREAST: ICD-10-CM

## 2024-08-05 DIAGNOSIS — Z17.0 MALIGNANT NEOPLASM OF UPPER-OUTER QUADRANT OF RIGHT BREAST IN FEMALE, ESTROGEN RECEPTOR POSITIVE (HCC): ICD-10-CM

## 2024-08-05 DIAGNOSIS — M81.0 AGE-RELATED OSTEOPOROSIS WITHOUT CURRENT PATHOLOGICAL FRACTURE: ICD-10-CM

## 2024-08-05 DIAGNOSIS — Z78.0 POST-MENOPAUSE: ICD-10-CM

## 2024-08-05 DIAGNOSIS — M16.0 ARTHRITIS OF BOTH HIPS: ICD-10-CM

## 2024-08-05 DIAGNOSIS — M47.819 ARTHRITIS OF LOW BACK: ICD-10-CM

## 2024-08-05 DIAGNOSIS — E78.00 PURE HYPERCHOLESTEROLEMIA: ICD-10-CM

## 2024-08-05 DIAGNOSIS — B35.1 ONYCHOMYCOSIS OF TOENAIL: ICD-10-CM

## 2024-08-05 DIAGNOSIS — E78.5 HYPERLIPIDEMIA: ICD-10-CM

## 2024-08-05 PROCEDURE — 96372 THER/PROPH/DIAG INJ SC/IM: CPT | Performed by: FAMILY MEDICINE

## 2024-08-05 PROCEDURE — G0439 PPPS, SUBSEQ VISIT: HCPCS | Performed by: FAMILY MEDICINE

## 2024-08-05 PROCEDURE — 3017F COLORECTAL CA SCREEN DOC REV: CPT | Performed by: FAMILY MEDICINE

## 2024-08-05 PROCEDURE — 1123F ACP DISCUSS/DSCN MKR DOCD: CPT | Performed by: FAMILY MEDICINE

## 2024-08-05 RX ORDER — PANTOPRAZOLE SODIUM 40 MG/1
40 TABLET, DELAYED RELEASE ORAL 2 TIMES DAILY
COMMUNITY
Start: 2024-07-09

## 2024-08-05 RX ORDER — MESALAMINE 1.2 G/1
TABLET, DELAYED RELEASE ORAL
COMMUNITY
Start: 2024-07-29

## 2024-08-05 RX ORDER — ATORVASTATIN CALCIUM 20 MG/1
TABLET, FILM COATED ORAL
Qty: 90 TABLET | Refills: 1 | Status: SHIPPED | OUTPATIENT
Start: 2024-08-05

## 2024-08-05 NOTE — PATIENT INSTRUCTIONS
Eating Healthy Foods: Care Instructions  With every meal, you can make healthy food choices. Try to eat a variety of fruits, vegetables, whole grains, lean proteins, and low-fat dairy products. This can help you get the right balance of nutrients, including vitamins and minerals. Small changes add up over time. You can start by adding one healthy food to your meals each day.    Try to make half your plate fruits and vegetables, one-fourth whole grains, and one-fourth lean proteins. Try including dairy with your meals.   Eat more fruits and vegetables. Try to have them with most meals and snacks.   Foods for healthy eating        Fruits   These can be fresh, frozen, canned, or dried.  Try to choose whole fruit rather than fruit juice.  Eat a variety of colors.        Vegetables   These can be fresh, frozen, canned, or dried.  Beans, peas, and lentils count too.        Whole grains   Choose whole-grain breads, cereals, and noodles.  Try brown rice.        Lean proteins   These can include lean meat, poultry, fish, and eggs.  You can also have tofu, beans, peas, lentils, nuts, and seeds.        Dairy   Try milk, yogurt, and cheese.  Choose low-fat or fat-free when you can.  If you need to, use lactose-free milk or fortified plant-based milk products, such as soy milk.        Water   Drink water when you're thirsty.  Limit sugar-sweetened drinks, including soda, fruit drinks, and sports drinks.  Where can you learn more?  Go to https://www.[employer].net/patientEd and enter T756 to learn more about \"Eating Healthy Foods: Care Instructions.\"  Current as of: September 20, 2023  Content Version: 14.1  © 6754-9257 [employer].   Care instructions adapted under license by [employer]. If you have questions about a medical condition or this instruction, always ask your healthcare professional. [employer] disclaims any warranty or liability for your use of this information.           A Healthy

## 2024-08-05 NOTE — PROGRESS NOTES
Administrations This Visit       denosumab (PROLIA) SC injection 60 mg       Admin Date  08/05/2024  10:40 Action  Given Dose  60 mg Route  SubCUTAneous Site  Arm Left Administered By  Flaca Cardenas MA    Ordering Provider: Nalini Ashford MD    Patient Supplied?: No                 Pt tolerated appropriately.

## 2024-08-05 NOTE — PROGRESS NOTES
Medicare Annual Wellness Visit    Tonya Engle is here for Medicare AWV    Assessment & Plan   Medicare annual wellness visit, subsequent  Pure hypercholesterolemia  Acquired hypothyroidism  Blood per rectum  Age-related osteoporosis without current pathological fracture  -     denosumab (PROLIA) SC injection 60 mg; 60 mg, SubCUTAneous, ONCE, 1 dose, On Mon 8/5/24 at 1100  Arthritis of both hips  Arthritis of low back  Malignant neoplasm of upper-outer quadrant of right breast in female, estrogen receptor positive (HCC)  Onychomycosis of toenail  History of tobacco use  -     Low Dose Chest CT -Abnormal Lung Screen Follow up; Future  Use of anastrozole (Arimidex)  S/P lumpectomy, right breast  Post-menopause  -     denosumab (PROLIA) SC injection 60 mg; 60 mg, SubCUTAneous, ONCE, 1 dose, On Mon 8/5/24 at 1100  Gee's esophagus with low grade dysplasia  Inflammatory bowel disease    Recommendations for Preventive Services Due: see orders and patient instructions/AVS.  Recommended screening schedule for the next 5-10 years is provided to the patient in written form: see Patient Instructions/AVS.     Return in 6 months (on 2/5/2025).     Subjective     Well Adult Physical: Patient here for a comprehensive physical exam.The patient reports problems - cholesterol and thyroid issues are controlled on current medications.   Dexa scan in 2015 shows osteoporosis. Taking prolia injections.  Do you take any herbs or supplements that were not prescribed by a doctor? no Are you taking calcium supplements? yes Are you taking aspirin daily? yes   History:  Any STD's in the past? none    In  she was diagnosed with a mass in her breast and had a core needle biopsy which was suspicious and subsequent lumpectomy which showed no cancer.  She has a strong family history of breast cancer so she had a breast MRI in July 2020.  She was found to have an abnormal mass in the right breast and had a lumpectomy that showed an

## 2024-08-06 NOTE — PROGRESS NOTES
[facility] [address], [address]  [facility] [address]  Dept: [phone]  Dept Fax: [phone] Loc: [phone]      Visit Date:8/7/2024     Tonya Engle is a 74 y.o. female who presents today for:   Chief Complaint   Patient presents with    Follow-up     Malignant neoplasm of upper-outer quadrant of right breast in female, estrogen receptor positive        HPI:   Tonya Engle is a 74 y.o. female who follows in our office with history of breast cancer.  HPI per previous note in our office:  The patient had annual mammogram in October 2019.  It showed possible irregular density with architectural distortion in the left breast.  Additional compression and lateromedial views were recommended as well as left breast ultrasound.  There was no sonographic correlate for the irregular left breast mammographic abnormality.  Stereotactic guided biopsy was recommended.  In addition ultrasound revealed an 0.8 cm x 0.7 cm oval lesion in the left breast upper outer aspect.  Differential diagnosis included complex cyst or a solid mass.  Diagnostic aspiration or possible core biopsy was recommended.  Left breast core needle biopsy on October 23, 2019 showed atypical ductal hyperplasia, atypical lobular hyperplasia.  On November 7, 2019 the patient underwent left breast lumpectomy that showed only focal atypical ductal hyperplasia, focal atypical lobular hyperplasia.  On July 15, 2020 the patient underwent bilateral breast MRI, it showed to 1.9 cm x 1.1 cm irregular spiculated mass in the right breast at the 10 o'clock position.  There were no abnormal looking axillary lymph nodes.  Ultrasound of the right breast on July 23, 2020 confirmed the presence of irregular mass.  The same day the patient underwent ultrasound-guided biopsy which showed invasive ductal carcinoma, grade 2, estrogen receptor positive in 100% of cells, progesterone receptor positive and 60% of cells, HER-2 by IHC was

## 2024-08-07 ENCOUNTER — HOSPITAL ENCOUNTER (OUTPATIENT)
Dept: INFUSION THERAPY | Age: 74
Discharge: HOME | End: 2024-08-07
Payer: MEDICARE

## 2024-08-07 ENCOUNTER — OFFICE VISIT (OUTPATIENT)
Dept: ONCOLOGY | Age: 74
End: 2024-08-07
Payer: MEDICARE

## 2024-08-07 VITALS
RESPIRATION RATE: 16 BRPM | TEMPERATURE: 98.2 F | SYSTOLIC BLOOD PRESSURE: 145 MMHG | OXYGEN SATURATION: 98 % | DIASTOLIC BLOOD PRESSURE: 67 MMHG | HEART RATE: 69 BPM

## 2024-08-07 VITALS
HEART RATE: 69 BPM | BODY MASS INDEX: 24.9 KG/M2 | RESPIRATION RATE: 16 BRPM | OXYGEN SATURATION: 98 % | DIASTOLIC BLOOD PRESSURE: 67 MMHG | HEIGHT: 64 IN | SYSTOLIC BLOOD PRESSURE: 145 MMHG | TEMPERATURE: 98.2 F

## 2024-08-07 DIAGNOSIS — Z85.3 ENCOUNTER FOR FOLLOW-UP SURVEILLANCE OF BREAST CANCER: ICD-10-CM

## 2024-08-07 DIAGNOSIS — Z79.811 ENCOUNTER FOR MONITORING AROMATASE INHIBITOR THERAPY: ICD-10-CM

## 2024-08-07 DIAGNOSIS — Z51.81 ENCOUNTER FOR MONITORING AROMATASE INHIBITOR THERAPY: ICD-10-CM

## 2024-08-07 DIAGNOSIS — Z08 ENCOUNTER FOR FOLLOW-UP SURVEILLANCE OF BREAST CANCER: ICD-10-CM

## 2024-08-07 DIAGNOSIS — C50.411 MALIGNANT NEOPLASM OF UPPER-OUTER QUADRANT OF RIGHT BREAST IN FEMALE, ESTROGEN RECEPTOR POSITIVE (HCC): Primary | ICD-10-CM

## 2024-08-07 DIAGNOSIS — Z17.0 MALIGNANT NEOPLASM OF UPPER-OUTER QUADRANT OF RIGHT BREAST IN FEMALE, ESTROGEN RECEPTOR POSITIVE (HCC): Primary | ICD-10-CM

## 2024-08-07 PROCEDURE — 99211 OFF/OP EST MAY X REQ PHY/QHP: CPT

## 2024-08-07 PROCEDURE — G8420 CALC BMI NORM PARAMETERS: HCPCS | Performed by: NURSE PRACTITIONER

## 2024-08-07 PROCEDURE — 99213 OFFICE O/P EST LOW 20 MIN: CPT | Performed by: NURSE PRACTITIONER

## 2024-08-07 PROCEDURE — G8399 PT W/DXA RESULTS DOCUMENT: HCPCS | Performed by: NURSE PRACTITIONER

## 2024-08-07 PROCEDURE — 1036F TOBACCO NON-USER: CPT | Performed by: NURSE PRACTITIONER

## 2024-08-07 PROCEDURE — 3017F COLORECTAL CA SCREEN DOC REV: CPT | Performed by: NURSE PRACTITIONER

## 2024-08-07 PROCEDURE — 1123F ACP DISCUSS/DSCN MKR DOCD: CPT | Performed by: NURSE PRACTITIONER

## 2024-08-07 PROCEDURE — 1090F PRES/ABSN URINE INCON ASSESS: CPT | Performed by: NURSE PRACTITIONER

## 2024-08-07 PROCEDURE — G8427 DOCREV CUR MEDS BY ELIG CLIN: HCPCS | Performed by: NURSE PRACTITIONER

## 2024-08-09 ENCOUNTER — HOSPITAL ENCOUNTER (OUTPATIENT)
Dept: CT IMAGING | Age: 74
Discharge: HOME | End: 2024-08-09
Attending: FAMILY MEDICINE
Payer: MEDICARE

## 2024-08-09 DIAGNOSIS — Z87.891 HISTORY OF TOBACCO USE: ICD-10-CM

## 2024-08-09 PROCEDURE — 71271 CT THORAX LUNG CANCER SCR C-: CPT

## 2025-01-14 ENCOUNTER — TELEPHONE (OUTPATIENT)
Dept: FAMILY MEDICINE CLINIC | Age: 75
End: 2025-01-14

## 2025-01-14 NOTE — TELEPHONE ENCOUNTER
Prolia Insurance verification initiated thru [employer] Portal.    Pt has an appt with Dr Ashford 02/05/2025 and Prolia can be given at that appt..

## 2025-01-23 ENCOUNTER — TELEPHONE (OUTPATIENT)
Dept: FAMILY MEDICINE CLINIC | Age: 75
End: 2025-01-23

## 2025-01-23 NOTE — TELEPHONE ENCOUNTER
Prolia SOB received from [employer]. Pt has not met her Medicare Part B deductible of 257.00 - Pt's medicare supplement Plan F covers the Medicare Part B deductible, co-insurance and 100% of the excess charges.

## 2025-01-27 ASSESSMENT — RHEUMATOLOGY NEW PATIENT QUESTIONNAIRE
HEARTBURN OR REFLUX: N
MORNING STIFFNESS: Y
COLOR CHANGES OF HANDS OR FEET IN THE COLD: N
FEVER: N
NUMBNESS OR TINGLING IN HANDS OR FEET: N
DEPRESSION: N
COUGH: N
MEMORY LOSS: N
NIGHT SWEATS: N
VOMITING OF BLOOD OR COFFEE GROUND CONSISTENCY MATERIAL: N
VAGINAL DRYNESS: N
HOW WOULD YOU DESCRIBE YOUR STIFFNESS ON AVERAGE: MILD
DIFFICULTY BREATHING LYING DOWN: N
HEADACHES: N
EASILY LOSING TEMPER: N
FAINTING: N
DOUBLE OR BLURRED VISION: N
BEHAVIORAL CHANGES: N
SEIZURES: N
LOSS OF CONSCIOUSNESS: N
NODULES/BUMPS: N
EXCESSIVE HAIR LOSS (MORE THAN YOUR NORM): N
DRYNESS OF MOUTH: N
JAUNDICE: N
UNUSUAL FATIGUE: N
SKIN REDNESS: N
EYE PAIN: N
DIFFICULTY SWALLOWING: N
MUSCLE WEAKNESS: N
STOMACH PAIN: N
SUN SENSITIVE (SUN ALLERGY): N
ABNORMAL URINE: N
RASH: N
BLOOD IN STOOLS: N
EYE REDNESS: N
NAUSEA: N
MORNING STIFFNESS IN LOWER BACK: Y
PERSISTENT DIARRHEA: N
PAIN OR BURNING ON URINATION: N
RASH OR ULCERS: N
SWOLLEN OR TENDER GLANDS: N
UNEXPLAINED WEIGHT CHANGE: N
LOSS OF VISION: N
AGITATION: N
SKIN TIGHTNESS: N
BLACK STOOLS: N
INCREASED SUSCEPTIBILITY TO INFECTION: N
EASY BRUISING: N
SHORTNESS OF BREATH: N
ANXIETY: N
HOARSE VOICE: N
CHEST PAIN: N
UNUSUAL BLEEDING: N
UNUSUALLY RAPID OR SLOWED HEART RATE: N
DIFFICULTY STAYING ASLEEP: N
JOINT SWELLING: N
DIFFICULTY FALLING ASLEEP: N
UNEXPLAINED HEARING LOSS: N
SWOLLEN LEGS OR FEET: N
SORES IN MOUTH OR NOSE: N
ANEMIA: N
JOINT PAIN: N
EYE DRYNESS: N

## 2025-01-28 ENCOUNTER — OFFICE VISIT (OUTPATIENT)
Age: 75
End: 2025-01-28
Payer: MEDICARE

## 2025-01-28 VITALS
DIASTOLIC BLOOD PRESSURE: 72 MMHG | BODY MASS INDEX: 25.83 KG/M2 | WEIGHT: 151.3 LBS | HEIGHT: 64 IN | OXYGEN SATURATION: 97 % | HEART RATE: 73 BPM | SYSTOLIC BLOOD PRESSURE: 118 MMHG

## 2025-01-28 DIAGNOSIS — M54.50 CHRONIC BILATERAL LOW BACK PAIN WITHOUT SCIATICA: Primary | ICD-10-CM

## 2025-01-28 DIAGNOSIS — G89.29 CHRONIC BILATERAL LOW BACK PAIN WITHOUT SCIATICA: Primary | ICD-10-CM

## 2025-01-28 PROCEDURE — 1125F AMNT PAIN NOTED PAIN PRSNT: CPT | Performed by: INTERNAL MEDICINE

## 2025-01-28 PROCEDURE — 1123F ACP DISCUSS/DSCN MKR DOCD: CPT | Performed by: INTERNAL MEDICINE

## 2025-01-28 PROCEDURE — G8417 CALC BMI ABV UP PARAM F/U: HCPCS | Performed by: INTERNAL MEDICINE

## 2025-01-28 PROCEDURE — G8399 PT W/DXA RESULTS DOCUMENT: HCPCS | Performed by: INTERNAL MEDICINE

## 2025-01-28 PROCEDURE — 1090F PRES/ABSN URINE INCON ASSESS: CPT | Performed by: INTERNAL MEDICINE

## 2025-01-28 PROCEDURE — 1159F MED LIST DOCD IN RCRD: CPT | Performed by: INTERNAL MEDICINE

## 2025-01-28 PROCEDURE — G8427 DOCREV CUR MEDS BY ELIG CLIN: HCPCS | Performed by: INTERNAL MEDICINE

## 2025-01-28 PROCEDURE — 99203 OFFICE O/P NEW LOW 30 MIN: CPT | Performed by: INTERNAL MEDICINE

## 2025-01-28 PROCEDURE — 99204 OFFICE O/P NEW MOD 45 MIN: CPT | Performed by: INTERNAL MEDICINE

## 2025-01-28 PROCEDURE — 1036F TOBACCO NON-USER: CPT | Performed by: INTERNAL MEDICINE

## 2025-01-28 PROCEDURE — 3017F COLORECTAL CA SCREEN DOC REV: CPT | Performed by: INTERNAL MEDICINE

## 2025-01-28 ASSESSMENT — ENCOUNTER SYMPTOMS
COUGH: 0
ABDOMINAL PAIN: 0
NAUSEA: 0
SHORTNESS OF BREATH: 0
VOMITING: 0

## 2025-01-28 NOTE — PROGRESS NOTES
[facility]   Rheumatology Clinic Note      1/28/2025       Reason for Consult:  back pain  Requesting Physician:  Maria Teresa Arteaga MD     CHIEF COMPLAINT:    Chief Complaint   Patient presents with    New Patient     Joint Pain in Bilateral Hips     Joint Pain     Patient reports low back pain.  Pain level 3           HISTORY OF PRESENT ILLNESS:    74 y.o. female presents today for evaluation of back/hip pain. 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 From her last visit, seems that the UC is improving and mesalamine dose was decreased.    Pt reports being diagnosed with IBD in May 2024.  Reports having low back pain and bilateral hip pain for years. Somewhat progressively worsen.   Rates her back pain at 5-6/10 at baseline with episodes of worsening pain. Getting up from seated position and first few steps of walking aggravates the pain. Aleve helps the pain significantly, she takes it sparingly. Stiffness in morning lasts for few minutes.    No other significant joint pain. No joint swelling.    No skin rash. No psoriasis.          Past Medical History:     has a past medical history of Acquired hypothyroidism, Atypical ductal hyperplasia, breast, Atypical lobular hyperplasia (ALH) of left breast, BRCA1 negative, BRCA2 negative, Breast cancer (HCC), Genetic testing, History of therapeutic radiation, Hyperlipidemia, Invasive ductal carcinoma of breast (HCC), Osteopenia, and Osteoporosis.    Past Surgical History:     has a past surgical history that includes Appendectomy (1997); Tonsillectomy (child); Breast surgery (Left, 11/7/2019); [facility] US GUIDED BREAST BIOPSY W LOC DEVICE 1ST LESION RIGHT (7/23/2020); US PLACE BREAST LOC DEVICE 1ST LESION RIGHT (8/12/2020); US Breast Fine Needle Aspiration (Left, 2000); Breast biopsy (Right, 1980); Breast biopsy (Left, 2019); Breast biopsy (Right, 2020); Breast lumpectomy (Right, 8/12/2020); Breast lumpectomy (Right, 8/24/2020); Breast lumpectomy (Left, 2019);

## 2025-01-30 ENCOUNTER — HOSPITAL ENCOUNTER (OUTPATIENT)
Dept: GENERAL RADIOLOGY | Age: 75
Discharge: HOME | End: 2025-01-30
Attending: INTERNAL MEDICINE
Payer: MEDICARE

## 2025-01-30 ENCOUNTER — HOSPITAL ENCOUNTER (OUTPATIENT)
Age: 75
Discharge: HOME | End: 2025-01-30
Payer: MEDICARE

## 2025-01-30 DIAGNOSIS — M54.50 CHRONIC BILATERAL LOW BACK PAIN WITHOUT SCIATICA: ICD-10-CM

## 2025-01-30 DIAGNOSIS — G89.29 CHRONIC BILATERAL LOW BACK PAIN WITHOUT SCIATICA: ICD-10-CM

## 2025-01-30 LAB — ERYTHROCYTE [SEDIMENTATION RATE] IN BLOOD BY WESTERGREN METHOD: 7 MM/HR (ref 0–20)

## 2025-01-30 PROCEDURE — 85651 RBC SED RATE NONAUTOMATED: CPT

## 2025-01-30 PROCEDURE — 72100 X-RAY EXAM L-S SPINE 2/3 VWS: CPT

## 2025-01-30 PROCEDURE — 36415 COLL VENOUS BLD VENIPUNCTURE: CPT

## 2025-01-30 PROCEDURE — 80048 BASIC METABOLIC PNL TOTAL CA: CPT

## 2025-01-30 PROCEDURE — 86140 C-REACTIVE PROTEIN: CPT

## 2025-01-30 PROCEDURE — 72202 X-RAY EXAM SI JOINTS 3/> VWS: CPT

## 2025-01-31 LAB
ANION GAP SERPL CALC-SCNC: 10 MEQ/L (ref 8–16)
BUN SERPL-MCNC: 22 MG/DL (ref 7–22)
CALCIUM SERPL-MCNC: 9.3 MG/DL (ref 8.5–10.5)
CHLORIDE SERPL-SCNC: 105 MEQ/L (ref 98–111)
CO2 SERPL-SCNC: 27 MEQ/L (ref 23–33)
CREAT SERPL-MCNC: 0.8 MG/DL (ref 0.4–1.2)
CRP SERPL-MCNC: < 0.3 MG/DL (ref 0–1)
GFR SERPL CREATININE-BSD FRML MDRD: 76 ML/MIN/1.73M2
GLUCOSE SERPL-MCNC: 82 MG/DL (ref 70–108)
POTASSIUM SERPL-SCNC: 4.1 MEQ/L (ref 3.5–5.2)
SODIUM SERPL-SCNC: 142 MEQ/L (ref 135–145)

## 2025-02-02 SDOH — ECONOMIC STABILITY: FOOD INSECURITY: WITHIN THE PAST 12 MONTHS, YOU WORRIED THAT YOUR FOOD WOULD RUN OUT BEFORE YOU GOT MONEY TO BUY MORE.: NEVER TRUE

## 2025-02-02 SDOH — ECONOMIC STABILITY: INCOME INSECURITY: IN THE LAST 12 MONTHS, WAS THERE A TIME WHEN YOU WERE NOT ABLE TO PAY THE MORTGAGE OR RENT ON TIME?: NO

## 2025-02-02 SDOH — ECONOMIC STABILITY: FOOD INSECURITY: WITHIN THE PAST 12 MONTHS, THE FOOD YOU BOUGHT JUST DIDN'T LAST AND YOU DIDN'T HAVE MONEY TO GET MORE.: NEVER TRUE

## 2025-02-02 SDOH — ECONOMIC STABILITY: TRANSPORTATION INSECURITY
IN THE PAST 12 MONTHS, HAS THE LACK OF TRANSPORTATION KEPT YOU FROM MEDICAL APPOINTMENTS OR FROM GETTING MEDICATIONS?: NO

## 2025-02-02 ASSESSMENT — PATIENT HEALTH QUESTIONNAIRE - PHQ9
1. LITTLE INTEREST OR PLEASURE IN DOING THINGS: NOT AT ALL
SUM OF ALL RESPONSES TO PHQ9 QUESTIONS 1 & 2: 0
1. LITTLE INTEREST OR PLEASURE IN DOING THINGS: NOT AT ALL
SUM OF ALL RESPONSES TO PHQ QUESTIONS 1-9: 0
2. FEELING DOWN, DEPRESSED OR HOPELESS: NOT AT ALL
SUM OF ALL RESPONSES TO PHQ9 QUESTIONS 1 & 2: 0
2. FEELING DOWN, DEPRESSED OR HOPELESS: NOT AT ALL

## 2025-02-03 ENCOUNTER — TELEPHONE (OUTPATIENT)
Age: 75
End: 2025-02-03

## 2025-02-03 DIAGNOSIS — E78.5 HYPERLIPIDEMIA: ICD-10-CM

## 2025-02-03 PROBLEM — K50.919 CROHN'S DISEASE WITH COMPLICATION, UNSPECIFIED GASTROINTESTINAL TRACT LOCATION (HCC): Status: ACTIVE | Noted: 2025-02-03

## 2025-02-03 RX ORDER — ATORVASTATIN CALCIUM 20 MG/1
TABLET, FILM COATED ORAL
Qty: 90 TABLET | Refills: 1 | Status: SHIPPED | OUTPATIENT
Start: 2025-02-03

## 2025-02-05 ENCOUNTER — OFFICE VISIT (OUTPATIENT)
Dept: FAMILY MEDICINE CLINIC | Age: 75
End: 2025-02-05

## 2025-02-05 VITALS
SYSTOLIC BLOOD PRESSURE: 140 MMHG | OXYGEN SATURATION: 98 % | WEIGHT: 154.54 LBS | HEART RATE: 78 BPM | BODY MASS INDEX: 26.38 KG/M2 | RESPIRATION RATE: 16 BRPM | HEIGHT: 64 IN | TEMPERATURE: 97.8 F | DIASTOLIC BLOOD PRESSURE: 82 MMHG

## 2025-02-05 DIAGNOSIS — C50.411 MALIGNANT NEOPLASM OF UPPER-OUTER QUADRANT OF RIGHT BREAST IN FEMALE, ESTROGEN RECEPTOR POSITIVE (HCC): ICD-10-CM

## 2025-02-05 DIAGNOSIS — Z79.811 USE OF ANASTROZOLE (ARIMIDEX): ICD-10-CM

## 2025-02-05 DIAGNOSIS — M47.819 ARTHRITIS OF LOW BACK: ICD-10-CM

## 2025-02-05 DIAGNOSIS — E03.9 ACQUIRED HYPOTHYROIDISM: ICD-10-CM

## 2025-02-05 DIAGNOSIS — Z87.891 HISTORY OF TOBACCO USE: ICD-10-CM

## 2025-02-05 DIAGNOSIS — K50.919 CROHN'S DISEASE WITH COMPLICATION, UNSPECIFIED GASTROINTESTINAL TRACT LOCATION (HCC): ICD-10-CM

## 2025-02-05 DIAGNOSIS — K22.710 BARRETT'S ESOPHAGUS WITH LOW GRADE DYSPLASIA: ICD-10-CM

## 2025-02-05 DIAGNOSIS — E78.00 PURE HYPERCHOLESTEROLEMIA: Primary | ICD-10-CM

## 2025-02-05 DIAGNOSIS — B35.1 ONYCHOMYCOSIS OF TOENAIL: ICD-10-CM

## 2025-02-05 DIAGNOSIS — K52.9 INFLAMMATORY BOWEL DISEASE: ICD-10-CM

## 2025-02-05 DIAGNOSIS — M16.0 ARTHRITIS OF BOTH HIPS: ICD-10-CM

## 2025-02-05 DIAGNOSIS — N60.92 ATYPICAL LOBULAR HYPERPLASIA (ALH) OF LEFT BREAST: ICD-10-CM

## 2025-02-05 DIAGNOSIS — Z17.0 MALIGNANT NEOPLASM OF UPPER-OUTER QUADRANT OF RIGHT BREAST IN FEMALE, ESTROGEN RECEPTOR POSITIVE (HCC): ICD-10-CM

## 2025-02-05 DIAGNOSIS — Z98.890 S/P LUMPECTOMY, RIGHT BREAST: ICD-10-CM

## 2025-02-05 DIAGNOSIS — M81.0 AGE-RELATED OSTEOPOROSIS WITHOUT CURRENT PATHOLOGICAL FRACTURE: ICD-10-CM

## 2025-02-06 NOTE — PROGRESS NOTES
[facility]  [facility]  [address]  [address]  [facility] [address]  Dept: [phone]  Dept Fax: [phone]  Loc: [phone]    Tonya Engle is a 74 y.o. female who presents today for:  Chief Complaint   Patient presents with    6 Month Follow-Up           HPI:     HPI    Well Adult Physical: Patient here for a comprehensive physical exam.The patient reports problems - cholesterol and thyroid issues are controlled on current medications.   Dexa scan in 2015 shows osteoporosis. Taking prolia injections.  Do you take any herbs or supplements that were not prescribed by a doctor? no Are you taking calcium supplements? yes Are you taking aspirin daily? yes   History:  Any STD's in the past? none    In  she was diagnosed with a mass in her breast and had a core needle biopsy which was suspicious and subsequent lumpectomy which showed no cancer.  She has a strong family history of breast cancer so she had a breast MRI in July 2020.  She was found to have an abnormal mass in the right breast and had a lumpectomy that showed an invasive ductal carcinoma.   She is still seeing oncology and surgery for follow up.       Back and hip pain worse in the spring since getting back on her feet.  No metastatic disease on bone scan, osteopenia on dexa.  Back on arimidex and prolia.     Both hips are painful and in 9/2022 was found to have arthritis in the hips and low back.    PT was minimal relief.  Aleve prn is some relief.      History of Present Illness      Reviewed chart forpast medical history , surgical history , allergies, social history , family history and medications.    Health Maintenance   Topic Date Due    DTaP/Tdap/Td vaccine (2 - Td or Tdap) 08/14/2023    Respiratory Syncytial Virus (RSV) Pregnant or age 60 yrs+ (1 - 1-dose 75+ series) Never done    Lipids  07/30/2025    Annual Wellness Visit (Medicare)  08/06/2025    Depression Screen  
Administrations This Visit       denosumab (PROLIA) SC injection 60 mg       Admin Date  02/05/2025  11:12 Action  Given Dose  60 mg Route  SubCUTAneous Site  Arm Left Documented By  Geetha Teran LPN    NDC: 36070-406-95    Lot#: [id number]    : Mgv    Patient Supplied?: No                    Patient instructed to remain in clinic for 20 minutes after injection and was advised to report any adverse reaction to me immediately.    
Dont believe so and if so do not recall reading,   Bring pt in for a recheck?  
Immunizations Administered       Name Date Dose Route    Pneumococcal, PCV20, PREVNAR 20, (age 6w+), IM, 0.5mL 2/5/2025 0.5 mL Intramuscular    Site: Deltoid- Right    Lot: [id number]    NDC: 9870-9842-00            VIS GIVEN.  CONSENT SIGNED  PATIENT TOLERATED WELL.     1.   Are you sick today?  No  2.   Do you have allergies to medication,food, or any vaccine?  No          Allergies:  Patient has no known allergies.    3.   Have you ever had a serious reaction after receiving a vaccination?  No  4.   Do you have a long-term health problem with heart disease, lung disease, asthma, kidney disease,        metabolic disease (e.g., diabetes, anemia, or other blood disorder?  No  5.   Do you have cancer, leukemia, AIDS, or any other immune system problem?  No  6.   Have you had a seizure, brain, or other nervous system problem?  No          Medical History:    Past Medical History:   Diagnosis Date    Acquired hypothyroidism 5/25/2017    Atypical ductal hyperplasia, breast 2019    Left breast ADH, ALH, RAdial scar    Atypical lobular hyperplasia (ALH) of left breast 11/4/2019    BRCA1 negative     neg    BRCA2 negative     neg    Breast cancer (HCC) 2020    cancer    Genetic testing     breast negative    History of therapeutic radiation 2020    right breast 15 treatments    Hyperlipidemia     Invasive ductal carcinoma of breast (HCC) 07/2020    right    Osteopenia 5/25/2017    Osteoporosis 12/29/2016       7.   Do you take cortisone, prednisone, other steroids, or anticancer drugs, or have you had radiation        Treatments?  No          Current Medications:    Current Outpatient Medications   Medication Sig Dispense Refill    vitamin D (CHOLECALCIFEROL) 125 MCG (5000 UT) CAPS capsule Take 1 capsule by mouth daily      atorvastatin (LIPITOR) 20 MG tablet TAKE 1 TABLET DAILY 90 tablet 1    mesalamine (LIALDA) 1.2 g EC tablet Take by mouth 2 tablets once daily      pantoprazole (PROTONIX) 40 MG tablet Take 1 tablet by mouth 
Pt scheduled for nurse check Monday   
exhibits normal extraocular motion. Right pupil is round and reactive. Left pupil is round and reactive. Pupils are equal.   Cardiovascular: Normal rate, regular rhythm, S1 normal, S2 normal and normal heart sounds.  Exam reveals no gallop.    No murmur heard.  Pulmonary/Chest: Effort normal and breath sounds normal. No respiratory distress. She has no wheezes. She has no rhonchi. She has no rales.   Abdominal: Soft. Normal appearance and bowel sounds are normal. She exhibits no distension and no mass. There is no hepatosplenomegaly. No tenderness. She has no rigidity, no rebound and no guarding. No hernia.   Musculoskeletal:        Right lower leg: She exhibits no edema.        Left lower leg: She exhibits no edema.   Neurological: She is alert.             No Known Allergies  Prior to Visit Medications    Medication Sig Taking? Authorizing Provider   atorvastatin (LIPITOR) 20 MG tablet TAKE 1 TABLET DAILY  Nalini Ashford MD   mesalamine (LIALDA) 1.2 g EC tablet TAKE 4 TABLETS BY MOUTH ONCE DAILY WITH A MEAL  ProviderNaren MD   pantoprazole (PROTONIX) 40 MG tablet Take 1 tablet by mouth 2 times daily  Naren Irby MD   anastrozole (ARIMIDEX) 1 MG tablet TAKE 1 TABLET DAILY  June Carpenter MD PhD   Multiple Vitamins-Minerals (CENTRUM ADULT PO) Take by mouth  Naren Irby MD   Calcium-Magnesium-Vitamin D (CITRACAL CALCIUM+D PO) Take by mouth 2 times daily  Naren Irby MD   Ubiquinol 100 MG CAPS Take by mouth daily  Naren Irby MD   aspirin EC 81 MG EC tablet Take 1 tablet by mouth daily  Nalini Ashford MD       [facility] (Including outside providers/suppliers regularly involved in providing care):   Patient Care Team:  Nalini Ashford MD as PCP - General  Nalini Ashford MD as PCP - Empaneled Provider      Reviewed and updated this visit:

## 2025-02-07 ENCOUNTER — OFFICE VISIT (OUTPATIENT)
Dept: ONCOLOGY | Age: 75
End: 2025-02-07

## 2025-02-07 ENCOUNTER — HOSPITAL ENCOUNTER (OUTPATIENT)
Dept: INFUSION THERAPY | Age: 75
Discharge: HOME | End: 2025-02-07
Payer: MEDICARE

## 2025-02-07 VITALS
RESPIRATION RATE: 16 BRPM | WEIGHT: 151 LBS | BODY MASS INDEX: 25.78 KG/M2 | HEIGHT: 64 IN | DIASTOLIC BLOOD PRESSURE: 74 MMHG | HEART RATE: 73 BPM | SYSTOLIC BLOOD PRESSURE: 142 MMHG | TEMPERATURE: 97.9 F | OXYGEN SATURATION: 98 %

## 2025-02-07 VITALS
SYSTOLIC BLOOD PRESSURE: 142 MMHG | RESPIRATION RATE: 16 BRPM | TEMPERATURE: 97.9 F | HEART RATE: 73 BPM | OXYGEN SATURATION: 98 % | DIASTOLIC BLOOD PRESSURE: 74 MMHG

## 2025-02-07 DIAGNOSIS — C50.411 MALIGNANT NEOPLASM OF UPPER-OUTER QUADRANT OF RIGHT BREAST IN FEMALE, ESTROGEN RECEPTOR POSITIVE (HCC): Primary | ICD-10-CM

## 2025-02-07 DIAGNOSIS — Z85.3 ENCOUNTER FOR FOLLOW-UP SURVEILLANCE OF BREAST CANCER: ICD-10-CM

## 2025-02-07 DIAGNOSIS — Z12.31 VISIT FOR SCREENING MAMMOGRAM: ICD-10-CM

## 2025-02-07 DIAGNOSIS — Z79.811 ENCOUNTER FOR MONITORING AROMATASE INHIBITOR THERAPY: ICD-10-CM

## 2025-02-07 DIAGNOSIS — Z51.81 ENCOUNTER FOR MONITORING AROMATASE INHIBITOR THERAPY: ICD-10-CM

## 2025-02-07 DIAGNOSIS — Z08 ENCOUNTER FOR FOLLOW-UP SURVEILLANCE OF BREAST CANCER: ICD-10-CM

## 2025-02-07 DIAGNOSIS — Z17.0 MALIGNANT NEOPLASM OF UPPER-OUTER QUADRANT OF RIGHT BREAST IN FEMALE, ESTROGEN RECEPTOR POSITIVE (HCC): Primary | ICD-10-CM

## 2025-02-07 PROCEDURE — 99211 OFF/OP EST MAY X REQ PHY/QHP: CPT

## 2025-02-07 NOTE — PATIENT INSTRUCTIONS
Mammogram due after 6/27-ordered.  Please schedule   Return to clinic in 8 mos to see Dr. Lucio    Call our office if you want us to order MRI breasts

## 2025-02-07 NOTE — PROGRESS NOTES
Review of Systems   Pertinent review of systems noted in HPI, all other ROS negative.   Objective:   Physical Exam   BP (!) 142/74 (Site: Left Upper Arm, Position: Sitting, Cuff Size: Medium Adult)   Pulse 73   Temp 97.9 °F (36.6 °C) (Oral)   Resp 16   Ht 1.626 m (5' 4\")   Wt 68.5 kg (151 lb)   SpO2 98%   BMI 25.92 kg/m²    General appearance: No apparent distress, calm and cooperative.  HEENT: Pupils equal, round, and reactive to light. Conjunctivae/corneas clear. Oral mucosa moist  Neck: Supple, with full range of motion. Trachea midline.   Respiratory:  Normal respiratory effort. Clear to auscultation all lung fields.  Cardiovascular: RRR, S1/S2  Abdomen: Soft, non-tender, non-distended with active BS  Musculoskeletal: No clubbing, cyanosis or edema bilaterally.  She is able to ambulate in office  Skin: Skin color, texture, turgor normal.  No visible rashes or lesions.  Neurologic:  Neurovascularly intact without any focal sensory/motor deficits. Cranial nerves: II-XII intact, grossly non-focal.  Psychiatric: Alert and oriented x 3, thought content appropriate, normal insight  Capillary Refill: < 3 seconds   Peripheral Pulses: +2 palpable      Imaging Studies and Labs:   CBC:   Lab Results   Component Value Date    WBC 6.5 07/30/2024    HGB 14.1 07/30/2024    HCT 44.2 07/30/2024    MCV 95.9 07/30/2024     07/30/2024     BMP:   Lab Results   Component Value Date/Time     01/30/2025 02:01 PM    K 4.1 01/30/2025 02:01 PM     01/30/2025 02:01 PM    CO2 27 01/30/2025 02:01 PM    BUN 22 01/30/2025 02:01 PM    CREATININE 0.8 01/30/2025 02:01 PM    GLUCOSE 82 01/30/2025 02:01 PM    CALCIUM 9.3 01/30/2025 02:01 PM      LFT:   Lab Results   Component Value Date    ALT 18 07/30/2024    AST 34 07/30/2024    ALKPHOS 66 07/30/2024    BILITOT 0.5 07/30/2024      Assessment & Plan   Assessment and Plan:     1. Malignant neoplasm of upper-outer quadrant of right breast in female, estrogen receptor

## 2025-02-10 ENCOUNTER — NURSE ONLY (OUTPATIENT)
Dept: FAMILY MEDICINE CLINIC | Age: 75
End: 2025-02-10

## 2025-02-10 VITALS — HEART RATE: 80 BPM | DIASTOLIC BLOOD PRESSURE: 70 MMHG | SYSTOLIC BLOOD PRESSURE: 148 MMHG

## 2025-02-10 DIAGNOSIS — I10 PRIMARY HYPERTENSION: Primary | ICD-10-CM

## 2025-02-10 RX ORDER — LOSARTAN POTASSIUM 50 MG/1
50 TABLET ORAL DAILY
Qty: 90 TABLET | Refills: 1 | Status: SHIPPED | OUTPATIENT
Start: 2025-02-10

## 2025-02-10 NOTE — PROGRESS NOTES
Here for BP check after last visit it was elevated.  States running around 170-150's at home. Asymptomatic.    172/82 P 84.  148/70 P 80.

## 2025-02-10 NOTE — PROGRESS NOTES
Chief Complaint   Patient presents with    Blood Pressure Check       Vitals:    02/10/25 1510   BP: (!) 148/70   Pulse: 80       Start losartan and recheck in 2 weeks with the nurse      Call patient

## 2025-02-24 DIAGNOSIS — Z79.811 ENCOUNTER FOR MONITORING AROMATASE INHIBITOR THERAPY: ICD-10-CM

## 2025-02-24 DIAGNOSIS — Z17.0 MALIGNANT NEOPLASM OF UPPER-OUTER QUADRANT OF RIGHT BREAST IN FEMALE, ESTROGEN RECEPTOR POSITIVE (HCC): ICD-10-CM

## 2025-02-24 DIAGNOSIS — Z51.81 ENCOUNTER FOR MONITORING AROMATASE INHIBITOR THERAPY: ICD-10-CM

## 2025-02-24 DIAGNOSIS — T45.1X5A AROMATASE INHIBITOR-ASSOCIATED ARTHRALGIA: ICD-10-CM

## 2025-02-24 DIAGNOSIS — M25.50 AROMATASE INHIBITOR-ASSOCIATED ARTHRALGIA: ICD-10-CM

## 2025-02-24 DIAGNOSIS — C50.411 MALIGNANT NEOPLASM OF UPPER-OUTER QUADRANT OF RIGHT BREAST IN FEMALE, ESTROGEN RECEPTOR POSITIVE (HCC): ICD-10-CM

## 2025-02-25 ENCOUNTER — NURSE ONLY (OUTPATIENT)
Dept: FAMILY MEDICINE CLINIC | Age: 75
End: 2025-02-25

## 2025-02-25 ENCOUNTER — TELEPHONE (OUTPATIENT)
Dept: FAMILY MEDICINE CLINIC | Age: 75
End: 2025-02-25

## 2025-02-25 VITALS — HEART RATE: 81 BPM | SYSTOLIC BLOOD PRESSURE: 132 MMHG | DIASTOLIC BLOOD PRESSURE: 76 MMHG

## 2025-02-25 DIAGNOSIS — Z17.0 MALIGNANT NEOPLASM OF UPPER-OUTER QUADRANT OF RIGHT BREAST IN FEMALE, ESTROGEN RECEPTOR POSITIVE (HCC): ICD-10-CM

## 2025-02-25 DIAGNOSIS — C50.411 MALIGNANT NEOPLASM OF UPPER-OUTER QUADRANT OF RIGHT BREAST IN FEMALE, ESTROGEN RECEPTOR POSITIVE (HCC): ICD-10-CM

## 2025-02-25 DIAGNOSIS — Z51.81 ENCOUNTER FOR MONITORING AROMATASE INHIBITOR THERAPY: ICD-10-CM

## 2025-02-25 DIAGNOSIS — T45.1X5A AROMATASE INHIBITOR-ASSOCIATED ARTHRALGIA: ICD-10-CM

## 2025-02-25 DIAGNOSIS — M25.50 AROMATASE INHIBITOR-ASSOCIATED ARTHRALGIA: ICD-10-CM

## 2025-02-25 DIAGNOSIS — B35.1 ONYCHOMYCOSIS OF TOENAIL: ICD-10-CM

## 2025-02-25 DIAGNOSIS — Z79.811 ENCOUNTER FOR MONITORING AROMATASE INHIBITOR THERAPY: ICD-10-CM

## 2025-02-25 DIAGNOSIS — Z51.81 MEDICATION MONITORING ENCOUNTER: Primary | ICD-10-CM

## 2025-02-25 RX ORDER — ANASTROZOLE 1 MG/1
TABLET ORAL
Qty: 90 TABLET | Refills: 3 | Status: SHIPPED | OUTPATIENT
Start: 2025-02-25 | End: 2025-02-25 | Stop reason: SDUPTHER

## 2025-02-25 RX ORDER — ANASTROZOLE 1 MG/1
TABLET ORAL
Qty: 90 TABLET | Refills: 3 | Status: SHIPPED | OUTPATIENT
Start: 2025-02-25

## 2025-02-25 RX ORDER — TERBINAFINE HYDROCHLORIDE 250 MG/1
250 TABLET ORAL DAILY
Qty: 90 TABLET | Refills: 1 | Status: SHIPPED | OUTPATIENT
Start: 2025-02-25 | End: 2025-08-24

## 2025-02-25 NOTE — PROGRESS NOTES
Chief Complaint   Patient presents with    Blood Pressure Check       Vitals:    02/25/25 1027   BP: 132/76   Pulse: 81       No changes to med    Call patient

## 2025-02-25 NOTE — TELEPHONE ENCOUNTER
Restart lamisil and check LFT in 6 weeks    We have done EKGs in the past which have been normal    We can recheck EKG or if having symptoms like CP or SOB ten stress test.    Call patient

## 2025-02-25 NOTE — PROGRESS NOTES
Pt came in for BP check today. BP's listed in vitals tab. Writer also scanned the pt's recorded BP's from the last 2 weeks. They are in the media tab. Pt denies any elevated BP sx's.

## 2025-04-08 ENCOUNTER — HOSPITAL ENCOUNTER (OUTPATIENT)
Age: 75
Discharge: HOME | End: 2025-04-08

## 2025-04-09 ENCOUNTER — RESULTS FOLLOW-UP (OUTPATIENT)
Dept: FAMILY MEDICINE CLINIC | Age: 75
End: 2025-04-09

## 2025-04-09 ENCOUNTER — HOSPITAL ENCOUNTER (OUTPATIENT)
Dept: GENERAL RADIOLOGY | Age: 75
Discharge: HOME | End: 2025-04-09
Payer: MEDICARE

## 2025-04-09 DIAGNOSIS — Z51.81 MEDICATION MONITORING ENCOUNTER: ICD-10-CM

## 2025-04-09 DIAGNOSIS — R73.01 ELEVATED FASTING GLUCOSE: Primary | ICD-10-CM

## 2025-04-09 LAB
ALBUMIN SERPL BCG-MCNC: 3.9 G/DL (ref 3.4–4.9)
ALP SERPL-CCNC: 67 U/L (ref 38–126)
ALT SERPL W/O P-5'-P-CCNC: 19 U/L (ref 10–35)
ANION GAP SERPL CALC-SCNC: 10 MEQ/L (ref 8–16)
AST SERPL-CCNC: 28 U/L (ref 10–35)
BILIRUB CONJ SERPL-MCNC: < 0.1 MG/DL (ref 0–0.2)
BILIRUB SERPL-MCNC: 0.3 MG/DL (ref 0.3–1.2)
BUN SERPL-MCNC: 17 MG/DL (ref 8–23)
CALCIUM SERPL-MCNC: 9.5 MG/DL (ref 8.8–10.2)
CHLORIDE SERPL-SCNC: 106 MEQ/L (ref 98–111)
CHOLEST SERPL-MCNC: 126 MG/DL (ref 100–199)
CO2 SERPL-SCNC: 26 MEQ/L (ref 22–29)
CREAT SERPL-MCNC: 0.8 MG/DL (ref 0.5–0.9)
DEPRECATED MEAN GLUCOSE BLD GHB EST-ACNC: 108 MG/DL (ref 70–126)
DEPRECATED RDW RBC AUTO: 43.3 FL (ref 35–45)
ERYTHROCYTE [DISTWIDTH] IN BLOOD BY AUTOMATED COUNT: 12.8 % (ref 11.5–14.5)
GFR SERPL CREATININE-BSD FRML MDRD: 76 ML/MIN/1.73M2
GLUCOSE FASTING: 111 MG/DL (ref 74–109)
HBA1C MFR BLD HPLC: 5.6 % (ref 4–6)
HCT VFR BLD AUTO: 41.9 % (ref 37–47)
HDLC SERPL-MCNC: 50 MG/DL
HGB BLD-MCNC: 13.9 GM/DL (ref 12–16)
LDLC SERPL CALC-MCNC: 70 MG/DL
MCH RBC QN AUTO: 30.6 PG (ref 26–33)
MCHC RBC AUTO-ENTMCNC: 33.2 GM/DL (ref 32.2–35.5)
MCV RBC AUTO: 92.3 FL (ref 81–99)
PLATELET # BLD AUTO: 246 THOU/MM3 (ref 130–400)
PMV BLD AUTO: 10.3 FL (ref 9.4–12.4)
POTASSIUM SERPL-SCNC: 5.2 MEQ/L (ref 3.5–5.2)
PROT SERPL-MCNC: 6.6 G/DL (ref 6.4–8.3)
RBC # BLD AUTO: 4.54 MILL/MM3 (ref 4.2–5.4)
SODIUM SERPL-SCNC: 142 MEQ/L (ref 135–145)
TRIGL SERPL-MCNC: 31 MG/DL (ref 0–199)
TSH SERPL DL<=0.05 MIU/L-ACNC: 1.14 UIU/ML (ref 0.27–4.2)
WBC # BLD AUTO: 4.8 THOU/MM3 (ref 4.8–10.8)

## 2025-04-09 PROCEDURE — 83525 ASSAY OF INSULIN: CPT

## 2025-04-09 PROCEDURE — 83036 HEMOGLOBIN GLYCOSYLATED A1C: CPT

## 2025-04-09 PROCEDURE — 80061 LIPID PANEL: CPT

## 2025-04-09 PROCEDURE — 80053 COMPREHEN METABOLIC PANEL: CPT

## 2025-04-09 PROCEDURE — 84443 ASSAY THYROID STIM HORMONE: CPT

## 2025-04-09 PROCEDURE — 85027 COMPLETE CBC AUTOMATED: CPT

## 2025-04-09 PROCEDURE — 36415 COLL VENOUS BLD VENIPUNCTURE: CPT

## 2025-04-09 NOTE — RESULT ENCOUNTER NOTE
Labs look good  No changes to med  Sugar is a little high  Call lab to add A1c and insulin  Call patient

## 2025-04-11 LAB — INSULIN SERPL-ACNC: 8.6 MU/L

## 2025-04-30 RX ORDER — LOSARTAN POTASSIUM 50 MG/1
50 TABLET ORAL DAILY
Qty: 90 TABLET | Refills: 1 | Status: SHIPPED | OUTPATIENT
Start: 2025-04-30

## 2025-06-30 ENCOUNTER — HOSPITAL ENCOUNTER (OUTPATIENT)
Dept: WOMENS IMAGING | Age: 75
Discharge: HOME | End: 2025-06-30
Payer: MEDICARE

## 2025-06-30 VITALS — WEIGHT: 151 LBS | BODY MASS INDEX: 25.78 KG/M2 | HEIGHT: 64 IN

## 2025-06-30 DIAGNOSIS — Z12.31 VISIT FOR SCREENING MAMMOGRAM: ICD-10-CM

## 2025-06-30 PROCEDURE — 77067 SCR MAMMO BI INCL CAD: CPT

## 2025-07-30 ENCOUNTER — HOSPITAL ENCOUNTER (OUTPATIENT)
Age: 75
Discharge: HOME | End: 2025-07-30
Payer: MEDICARE

## 2025-07-30 DIAGNOSIS — K22.710 BARRETT'S ESOPHAGUS WITH LOW GRADE DYSPLASIA: ICD-10-CM

## 2025-07-30 DIAGNOSIS — E78.00 PURE HYPERCHOLESTEROLEMIA: ICD-10-CM

## 2025-07-30 DIAGNOSIS — E03.9 ACQUIRED HYPOTHYROIDISM: ICD-10-CM

## 2025-07-30 LAB
ALBUMIN SERPL BCG-MCNC: 4.1 G/DL (ref 3.4–4.9)
ALP SERPL-CCNC: 73 U/L (ref 38–126)
ALT SERPL W/O P-5'-P-CCNC: 13 U/L (ref 10–35)
ANION GAP SERPL CALC-SCNC: 11 MEQ/L (ref 8–16)
AST SERPL-CCNC: 25 U/L (ref 10–35)
BILIRUB SERPL-MCNC: 0.4 MG/DL (ref 0.3–1.2)
BUN SERPL-MCNC: 15 MG/DL (ref 8–23)
CALCIUM SERPL-MCNC: 10.4 MG/DL (ref 8.8–10.2)
CHLORIDE SERPL-SCNC: 105 MEQ/L (ref 98–111)
CHOLEST SERPL-MCNC: 143 MG/DL (ref 100–199)
CO2 SERPL-SCNC: 28 MEQ/L (ref 22–29)
CREAT SERPL-MCNC: 0.8 MG/DL (ref 0.5–0.9)
DEPRECATED RDW RBC AUTO: 45.1 FL (ref 35–45)
ERYTHROCYTE [DISTWIDTH] IN BLOOD BY AUTOMATED COUNT: 12.8 % (ref 11.5–14.5)
GFR SERPL CREATININE-BSD FRML MDRD: 76 ML/MIN/1.73M2
GLUCOSE FASTING: 102 MG/DL (ref 74–109)
HCT VFR BLD AUTO: 44.2 % (ref 37–47)
HDLC SERPL-MCNC: 53 MG/DL
HGB BLD-MCNC: 14.1 GM/DL (ref 12–16)
LDLC SERPL CALC-MCNC: 74 MG/DL
MCH RBC QN AUTO: 30.6 PG (ref 26–33)
MCHC RBC AUTO-ENTMCNC: 31.9 GM/DL (ref 32.2–35.5)
MCV RBC AUTO: 95.9 FL (ref 81–99)
PLATELET # BLD AUTO: 294 THOU/MM3 (ref 130–400)
PMV BLD AUTO: 10.4 FL (ref 9.4–12.4)
POTASSIUM SERPL-SCNC: 4.7 MEQ/L (ref 3.5–5.2)
PROT SERPL-MCNC: 6.7 G/DL (ref 6.4–8.3)
RBC # BLD AUTO: 4.61 MILL/MM3 (ref 4.2–5.4)
SODIUM SERPL-SCNC: 144 MEQ/L (ref 135–145)
TRIGL SERPL-MCNC: 81 MG/DL (ref 0–199)
TSH SERPL DL<=0.05 MIU/L-ACNC: 1.83 UIU/ML (ref 0.27–4.2)
WBC # BLD AUTO: 6.8 THOU/MM3 (ref 4.8–10.8)

## 2025-07-30 PROCEDURE — 80053 COMPREHEN METABOLIC PANEL: CPT

## 2025-07-30 PROCEDURE — 85027 COMPLETE CBC AUTOMATED: CPT

## 2025-07-30 PROCEDURE — 36415 COLL VENOUS BLD VENIPUNCTURE: CPT

## 2025-07-30 PROCEDURE — 84443 ASSAY THYROID STIM HORMONE: CPT

## 2025-07-30 PROCEDURE — 80061 LIPID PANEL: CPT

## 2025-07-31 DIAGNOSIS — E78.5 HYPERLIPIDEMIA: ICD-10-CM

## 2025-07-31 RX ORDER — ATORVASTATIN CALCIUM 20 MG/1
20 TABLET, FILM COATED ORAL DAILY
Qty: 90 TABLET | Refills: 1 | Status: SHIPPED | OUTPATIENT
Start: 2025-07-31

## 2025-08-04 ENCOUNTER — TELEPHONE (OUTPATIENT)
Dept: FAMILY MEDICINE CLINIC | Age: 75
End: 2025-08-04

## 2025-08-08 SDOH — HEALTH STABILITY: PHYSICAL HEALTH: ON AVERAGE, HOW MANY DAYS PER WEEK DO YOU ENGAGE IN MODERATE TO STRENUOUS EXERCISE (LIKE A BRISK WALK)?: 2 DAYS

## 2025-08-08 SDOH — HEALTH STABILITY: PHYSICAL HEALTH: ON AVERAGE, HOW MANY MINUTES DO YOU ENGAGE IN EXERCISE AT THIS LEVEL?: 20 MIN

## 2025-08-08 ASSESSMENT — PATIENT HEALTH QUESTIONNAIRE - PHQ9
SUM OF ALL RESPONSES TO PHQ QUESTIONS 1-9: 0
2. FEELING DOWN, DEPRESSED OR HOPELESS: NOT AT ALL
1. LITTLE INTEREST OR PLEASURE IN DOING THINGS: NOT AT ALL
SUM OF ALL RESPONSES TO PHQ QUESTIONS 1-9: 0

## 2025-08-08 ASSESSMENT — LIFESTYLE VARIABLES
HOW MANY STANDARD DRINKS CONTAINING ALCOHOL DO YOU HAVE ON A TYPICAL DAY: PATIENT DOES NOT DRINK
HOW MANY STANDARD DRINKS CONTAINING ALCOHOL DO YOU HAVE ON A TYPICAL DAY: 0
HOW OFTEN DO YOU HAVE A DRINK CONTAINING ALCOHOL: NEVER
HOW OFTEN DO YOU HAVE A DRINK CONTAINING ALCOHOL: 1
HOW OFTEN DO YOU HAVE SIX OR MORE DRINKS ON ONE OCCASION: 1

## 2025-08-11 ENCOUNTER — OFFICE VISIT (OUTPATIENT)
Dept: FAMILY MEDICINE CLINIC | Age: 75
End: 2025-08-11

## 2025-08-11 VITALS
RESPIRATION RATE: 16 BRPM | HEART RATE: 79 BPM | BODY MASS INDEX: 25.86 KG/M2 | TEMPERATURE: 97.4 F | SYSTOLIC BLOOD PRESSURE: 128 MMHG | OXYGEN SATURATION: 99 % | DIASTOLIC BLOOD PRESSURE: 60 MMHG | HEIGHT: 64 IN | WEIGHT: 151.46 LBS

## 2025-08-11 DIAGNOSIS — M47.819 ARTHRITIS OF LOW BACK: ICD-10-CM

## 2025-08-11 DIAGNOSIS — K50.919 CROHN'S DISEASE WITH COMPLICATION, UNSPECIFIED GASTROINTESTINAL TRACT LOCATION (HCC): ICD-10-CM

## 2025-08-11 DIAGNOSIS — K22.710 BARRETT'S ESOPHAGUS WITH LOW GRADE DYSPLASIA: ICD-10-CM

## 2025-08-11 DIAGNOSIS — C50.411 MALIGNANT NEOPLASM OF UPPER-OUTER QUADRANT OF RIGHT BREAST IN FEMALE, ESTROGEN RECEPTOR POSITIVE (HCC): ICD-10-CM

## 2025-08-11 DIAGNOSIS — M16.0 ARTHRITIS OF BOTH HIPS: ICD-10-CM

## 2025-08-11 DIAGNOSIS — Z17.0 MALIGNANT NEOPLASM OF UPPER-OUTER QUADRANT OF RIGHT BREAST IN FEMALE, ESTROGEN RECEPTOR POSITIVE (HCC): ICD-10-CM

## 2025-08-11 DIAGNOSIS — Z87.891 HISTORY OF TOBACCO USE: ICD-10-CM

## 2025-08-11 DIAGNOSIS — E78.00 PURE HYPERCHOLESTEROLEMIA: ICD-10-CM

## 2025-08-11 DIAGNOSIS — E03.9 ACQUIRED HYPOTHYROIDISM: ICD-10-CM

## 2025-08-11 DIAGNOSIS — Z00.00 MEDICARE ANNUAL WELLNESS VISIT, SUBSEQUENT: Primary | ICD-10-CM

## 2025-08-11 DIAGNOSIS — M81.0 AGE-RELATED OSTEOPOROSIS WITHOUT CURRENT PATHOLOGICAL FRACTURE: ICD-10-CM

## 2025-08-11 DIAGNOSIS — B35.1 ONYCHOMYCOSIS OF TOENAIL: ICD-10-CM

## 2025-08-11 DIAGNOSIS — I10 PRIMARY HYPERTENSION: ICD-10-CM

## 2025-08-11 DIAGNOSIS — R73.01 ELEVATED FASTING GLUCOSE: ICD-10-CM

## 2025-08-11 DIAGNOSIS — Z79.811 USE OF ANASTROZOLE (ARIMIDEX): ICD-10-CM

## 2025-08-11 DIAGNOSIS — Z98.890 S/P LUMPECTOMY, RIGHT BREAST: ICD-10-CM

## 2025-08-11 RX ORDER — TERBINAFINE HYDROCHLORIDE 250 MG/1
250 TABLET ORAL DAILY
Qty: 90 TABLET | Refills: 0 | Status: SHIPPED | OUTPATIENT
Start: 2025-08-11 | End: 2026-02-07

## 2025-08-18 ENCOUNTER — HOSPITAL ENCOUNTER (OUTPATIENT)
Dept: CT IMAGING | Age: 75
Discharge: HOME | End: 2025-08-18
Attending: FAMILY MEDICINE
Payer: MEDICARE

## 2025-08-18 DIAGNOSIS — Z87.891 HISTORY OF TOBACCO USE: ICD-10-CM

## 2025-08-18 PROCEDURE — 71271 CT THORAX LUNG CANCER SCR C-: CPT

## (undated) DEVICE — AGENT HEMSTAT 3GM PURIFIED PLNT STARCH PWD ABSRB ARISTA AH

## (undated) DEVICE — PACK PROCEDURE SURG PLAS SC MIN SRHP LF

## (undated) DEVICE — GLOVE ORANGE PI 7 1/2   MSG9075

## (undated) DEVICE — GLOVE SURG SZ 65 THK91MIL LTX FREE SYN POLYISOPRENE

## (undated) DEVICE — APPLICATOR PREP 26ML 0.7% IOD POVACRYLEX 74% ISO ALC ST

## (undated) DEVICE — GOWN,SIRUS,NON REINFRCD,LARGE,SET IN SL: Brand: MEDLINE

## (undated) DEVICE — HYPODERMIC SAFETY NEEDLE: Brand: MAGELLAN

## (undated) DEVICE — GLOVE ORANGE PI 7   MSG9070

## (undated) DEVICE — BREAST HERNIA PACK: Brand: MEDLINE INDUSTRIES, INC.

## (undated) DEVICE — PENCIL SMK EVAC ALL IN 1 DSGN ENH VISIBILITY IMPROVED AIR

## (undated) DEVICE — SPECIMEN ORIENTATION CHARMS, SIX DISTINCTLY SHAPED STERILE 10MM CHARMS: Brand: MARGINMAP

## (undated) DEVICE — APPLIER LIG CLP M L11IN TI STR RNG HNDL FOR 20 CLP DISP

## (undated) DEVICE — SHEET, T, LAPAROTOMY, STERILE: Brand: MEDLINE

## (undated) DEVICE — COVER ARMBRD W13XL28.5IN IMPERV BLU FOR OP RM

## (undated) DEVICE — 4-PORT MANIFOLD: Brand: NEPTUNE 2

## (undated) DEVICE — GLOVE ORANGE PI 8   MSG9080

## (undated) DEVICE — SKIN AFFIX SURG ADHESIVE 72/CS 0.55ML: Brand: MEDLINE

## (undated) DEVICE — C-ARMOR C-ARM EQUIPMENT COVERS CLEAR STERILE UNIVERSAL FIT 12 PER CASE: Brand: C-ARMOR

## (undated) DEVICE — SOLUTION IV 1000ML 0.9% SOD CHL PH 5 INJ USP VIAFLX PLAS

## (undated) DEVICE — BASIC SINGLE BASIN BTC-LF: Brand: MEDLINE INDUSTRIES, INC.

## (undated) DEVICE — DRAPE,EXTREMITY,89X128,STERILE: Brand: MEDLINE

## (undated) DEVICE — APPLICATOR MEDICATED 26 CC SOLUTION HI LT ORNG CHLORAPREP

## (undated) DEVICE — TUBING, SUCTION, 1/4" X 12', STRAIGHT: Brand: MEDLINE

## (undated) DEVICE — YANKAUER,BULB TIP,W/O VENT,RIGID,STERILE: Brand: MEDLINE

## (undated) DEVICE — TOWEL,OR,DSP,ST,BLUE,STD,4/PK,20PK/CS: Brand: MEDLINE

## (undated) DEVICE — ADHESIVE SKIN CLSR 0.7ML TOP DERMBND ADV